# Patient Record
Sex: MALE | Race: WHITE | NOT HISPANIC OR LATINO | Employment: OTHER | URBAN - METROPOLITAN AREA
[De-identification: names, ages, dates, MRNs, and addresses within clinical notes are randomized per-mention and may not be internally consistent; named-entity substitution may affect disease eponyms.]

---

## 2017-01-10 ENCOUNTER — OFFICE VISIT (OUTPATIENT)
Dept: NEUROLOGY | Facility: CLINIC | Age: 72
End: 2017-01-10

## 2017-01-10 VITALS — DIASTOLIC BLOOD PRESSURE: 78 MMHG | HEIGHT: 70 IN | SYSTOLIC BLOOD PRESSURE: 135 MMHG | HEART RATE: 56 BPM

## 2017-01-10 DIAGNOSIS — R20.8 BURNING SENSATION OF FEET: Primary | ICD-10-CM

## 2017-01-10 DIAGNOSIS — R20.8 BURNING SENSATION OF FEET: ICD-10-CM

## 2017-01-10 ASSESSMENT — PAIN SCALES - GENERAL: PAINLEVEL: NO PAIN (0)

## 2017-01-10 NOTE — PROGRESS NOTES
Re: Daniel Samayoa      MRN# [id number]  YOB: 1945  Date of Visit:1/10/2017     OUTPATIENT NEUROLOGY VISIT NOTE    Reason for Visit:  EMG results follow up    Interval History:  Daniel Samayoa is a 71-year-old male presents to the clinic today for EMG results follow up    Symptoms are better. Pool therapy has been helpful.   EMG results discussed and given to the patient.   Interpretation:     This study is essentially normal. There is no electrodiagnostic evidence of a polyneuropathy, or right lumbosacral radiculopathy. Increased insertional activity in two muscles of the right leg is non-specific/non-diagnostic.      EMG Physician:     Mika Youngblood MD   Wait and see approach. Follow up in 3 month for re-exam.   Denies any new symptoms today.     Past Medical History reviewed   Social History   Substance Use Topics     Smoking status: Never Smoker      Smokeless tobacco: Never Used     Alcohol Use: 1.0 oz/week     2 Cans of beer per week      Comment: little    reviewed and verified with the patient    Current Outpatient Prescriptions   Medication Sig Dispense Refill     omeprazole (PRILOSEC) 20 MG capsule TAKE 1 CAPSULE BY MOUTH DAILY. 30 capsule 11     sildenafil (VIAGRA) 100 MG tablet Take 0.5-1 tablets ( mg) by mouth daily as needed for erectile dysfunction Take 30 min to 4 hours before intercourse.  Never use with nitroglycerin, terazosin or doxazosin. 6 tablet 3     triamcinolone (KENALOG) 0.1 % cream Apply topically 3 times daily as needed for irritation 30 g 0     rosuvastatin (CRESTOR) 5 MG tablet Take 1 tablet (5 mg) by mouth At Bedtime 90 tablet 3     calcium carbonate (TUMS) 500 MG chewable tablet Take 2 chew tab by mouth daily       aspirin 81 MG EC tablet Take 81 mg by mouth daily.       Ibuprofen (ADVIL) 200 MG capsule Take 200 mg by mouth every 4 hours as needed.       Calcium-Vitamin D (CALCIUM 500 +D) 500-125 MG-UNIT TABS Take  by mouth daily.       Cholecalciferol  "(D 2000) 2000 UNIT tablet Take 1 tablet by mouth daily.       Ascorbic Acid (C 1000 PO) Take  by mouth.       fish oil-omega-3 fatty acids (FISH OIL) 1000 MG capsule Take 2 g by mouth daily.       B Complex Vitamins (B COMPLEX 50 PO) Take  by mouth.     reviewed and verified with the patient    Review of Systems:   A 10-point ROS including constitutional, eyes, respiratory, cardiovascular, gastroenterology, genitourinary, integumentary, musculoskeletal, neurology and psychiatric were all negative except as mentioned in the interval history.      General Exam:   /78 mmHg  Pulse 56  Ht 1.778 m (5' 10\")  GEN: Awake, NAD; good eye contact, responses appropriately Speech is fluent without dysarthria.Face is symmetrical. Intact and symmetrical eyebrow and lid raise and eyelid closure, smiles. Hearing Intact to conversation speech.Normal casual gait.  Assessment and Plan:  See Interval History for discussion and plan.     I discussed all my recommendation with Daniel Samayoa. The patient verbalizes understanding and comfortable with the plan. The patient has our clinic phone number to call with any questions or concerns. All of the patient's questions were answered from the best of my current knowledge. Follow up as needed.    Time spent with pt answering questions, discussing findings, counseling and coordinating care was more than 50% the appointment time,  10 minutes.         ARNOLDO Vásquez, [facility]      "

## 2017-01-10 NOTE — PROGRESS NOTES
"    [facility]  Electrodiagnostic Laboratory    Nerve Conduction & EMG Report          Patient:       Daniel Samayoa  Patient ID:    [id number]  Gender:        Male  YOB: 1945  Age:           71 Years 3 Months      Referring Provider: Karlee Vieyra NP    History & Examination:    71 year old man with burning sensation at his feet for about 3-4 months. Symptoms are symmetric. 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 EMG requested to \"localize\" the cause of the chief complaint- polyneuropathy vs lumbosacral radiculopathies.     Techniques: Motor and sensory conduction studies were done with surface recording electrodes.Temperature was monitored and recorded throughout the study. Upper extremities were maintained at a temperature of 32 degrees Centigrade or higher.  Lower extremities were maintained at a temperature of 31degrees Centigrade or higher. EMG was done with a concentric needle electrode.     Results:    Right radial and sural antidromic sensory NCSs were normal. The sural to radial SNAP amplitude ratio was at the lower limits of normal (0.21, normal is >0.2). The left sural nerve was intentionally not studied because the patient had a metal plate from prior orthopedic procedure at the left lateral calf very close to the stimulation site of the nerve. Right median, deep peroneal and tibial motor NCSs were normal. Right tibial F-wave latencies were normal. EMG of right tibialis anterior and vastus lateralis showed increased insertional activity and occasional fasciculations, bu no sustained fibrillationss/positive waves, and normal MUP morphology and recruitment patterns. EMG of right medial gastrocnemius was normal.     Interpretation:    This study is essentially normal. There is no electrodiagnostic evidence of a polyneuropathy, or right lumbosacral radiculopathy. Increased insertional activity in two muscles of the right leg is " non-specific/non-diagnostic.     EMG Physician:    Mika Youngblood MD       Sensory NCS      Nerve / Sites Rec. Site Onset Peak NP Amp Ref. PP Amp Dist Rashaun Ref. Temp     ms ms  V  V  V cm m/s m/s  C   R RADIAL - Snuff      Forearm Snuff 2.19 2.76 26.8 15.0 19.3 12.5 57.1 48.0 26   R SURAL - Lat Mall 60      Calf Ankle 3.39 4.17 5.8 5.0 4.8 14 41.4 38.0 30.8       Motor NCS      Nerve / Sites Rec. Site Lat Ref. Amp Ref. Rel Amp Dist Rashaun Ref. Dur. Area Temp.     ms ms mV mV % cm m/s m/s ms %  C   R MEDIAN - APB      Wrist APB 3.65 4.40 9.5 5.0 100 8   7.55 100 31.5      Elbow APB 8.28  8.2  86.8 24 51.8 48.0 7.45 81.8 30.4   R DEEP PERONEAL - EDB 60      Ankle EDB 4.48 6.00 6.3 2.0 100 8   6.77 100 30.7      FibHead EDB 11.61  5.8  91.4 30.5 42.7 38.0 7.60 97.3 30.7      Pop Fos EDB 13.33  5.3  83.9 8 46.5 38.0 7.81 88.1 30.7   R TIBIAL - AH      Ankle AH 5.42 6.00 11.1 4.0 100 8   5.73 100 31      Pop Fos AH 15.47  5.8  52.2 40 39.8 38.0 6.88 79.2 31       F  Wave      Nerve Min F Lat Max F Lat Mean FLat Temp.    ms ms ms  C   R TIBIAL 55.94 57.81 57.03 31       EMG Summary Table     Spontaneous MUAP Recruitment    IA Fib PSW Fasc H.F. Amp Dur. PPP Pattern   R. TIB ANTERIOR 1+ None None 1+ None N N N N   R. GASTROCN (MED) N None None None None N N N N   R. VAST LATERALIS 2+ None None None None N N N N

## 2017-01-10 NOTE — Clinical Note
"1/10/2017       RE: Daniel Samayoa  [address]  [facility] [address]     Dear Colleague,    Thank you for referring your patient, Daniel Samayoa, to the [facility] EMG at [facility]. Please see a copy of my visit note below.        [facility]  Electrodiagnostic Laboratory    Nerve Conduction & EMG Report          Patient:       Daniel Samayoa  Patient ID:    [id number]  Gender:        Male  YOB: 1945  Age:           71 Years 3 Months      Referring Provider: Karlee Vieyra NP    History & Examination:    71 year old man with burning sensation at his feet for about 3-4 months. Symptoms are symmetric. 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 EMG requested to \"localize\" the cause of the chief complaint- polyneuropathy vs lumbosacral radiculopathies.     Techniques: Motor and sensory conduction studies were done with surface recording electrodes.Temperature was monitored and recorded throughout the study. Upper extremities were maintained at a temperature of 32 degrees Centigrade or higher.  Lower extremities were maintained at a temperature of 31degrees Centigrade or higher. EMG was done with a concentric needle electrode.     Results:    Right radial and sural antidromic sensory NCSs were normal. The sural to radial SNAP amplitude ratio was at the lower limits of normal (0.21, normal is >0.2). The left sural nerve was intentionally not studied because the patient had a metal plate from prior orthopedic procedure at the left lateral calf very close to the stimulation site of the nerve. Right median, deep peroneal and tibial motor NCSs were normal. Right tibial F-wave latencies were normal. EMG of right tibialis anterior and vastus lateralis showed increased insertional activity and occasional fasciculations, bu no sustained fibrillationss/positive waves, and normal MUP morphology and recruitment patterns. EMG of right " medial gastrocnemius was normal.     Interpretation:    This study is essentially normal. There is no electrodiagnostic evidence of a polyneuropathy, or right lumbosacral radiculopathy. Increased insertional activity in two muscles of the right leg is non-specific/non-diagnostic.     EMG Physician:    Mika Youngblood MD       Sensory NCS      Nerve / Sites Rec. Site Onset Peak NP Amp Ref. PP Amp Dist Rashaun Ref. Temp     ms ms  V  V  V cm m/s m/s  C   R RADIAL - Snuff      Forearm Snuff 2.19 2.76 26.8 15.0 19.3 12.5 57.1 48.0 26   R SURAL - Lat Mall 60      Calf Ankle 3.39 4.17 5.8 5.0 4.8 14 41.4 38.0 30.8       Motor NCS      Nerve / Sites Rec. Site Lat Ref. Amp Ref. Rel Amp Dist Rashaun Ref. Dur. Area Temp.     ms ms mV mV % cm m/s m/s ms %  C   R MEDIAN - APB      Wrist APB 3.65 4.40 9.5 5.0 100 8   7.55 100 31.5      Elbow APB 8.28  8.2  86.8 24 51.8 48.0 7.45 81.8 30.4   R DEEP PERONEAL - EDB 60      Ankle EDB 4.48 6.00 6.3 2.0 100 8   6.77 100 30.7      FibHead EDB 11.61  5.8  91.4 30.5 42.7 38.0 7.60 97.3 30.7      Pop Fos EDB 13.33  5.3  83.9 8 46.5 38.0 7.81 88.1 30.7   R TIBIAL - AH      Ankle AH 5.42 6.00 11.1 4.0 100 8   5.73 100 31      Pop Fos AH 15.47  5.8  52.2 40 39.8 38.0 6.88 79.2 31       F  Wave      Nerve Min F Lat Max F Lat Mean FLat Temp.    ms ms ms  C   R TIBIAL 55.94 57.81 57.03 31       EMG Summary Table     Spontaneous MUAP Recruitment    IA Fib PSW Fasc H.F. Amp Dur. PPP Pattern   R. TIB ANTERIOR 1+ None None 1+ None N N N N   R. GASTROCN (MED) N None None None None N N N N   R. VAST LATERALIS 2+ None None None None N N N N                    Quick Note:    Reviewed with the patient today. Patient reports that his symptoms have been better after starting his pool therapy. Reports that his stress is better as well. Wait and see approach discussed. Follow up in 3-6 month or sooner if needed for re-exam.  ______      Mika Youngblood MD

## 2017-01-10 NOTE — MR AVS SNAPSHOT
After Visit Summary   1/10/2017    Daniel Samayoa    MRN: [id number]           Patient Information     Date Of Birth          1945        Visit Information        Provider Department      1/10/2017 12:30 PM Karlee Vieyra APRN [facility]        Care Instructions    Wait and see approach. Follow up in 3 month for re-exam.           Follow-ups after your visit        Your next 10 appointments already scheduled     Jan 11, 2017  9:00 AM   New Visit with Kathy Ramirez OD   [facility]    [address] [place] [address] [phone]              Who to contact     Please call your clinic at [phone] to:    Ask questions about your health    Make or cancel appointments    Discuss your medicines    Learn about your test results    Speak to your doctor   If you have compliments or concerns about an experience at your clinic, or if you wish to file a complaint, please contact [facility] Patient Relations at [phone] or email us at [email or web address]         Additional Information About Your Visit        MyChart Information     [employer] gives you secure access to your electronic health record. If you see a primary care provider, you can also send messages to your care team and make appointments. If you have questions, please call your primary care clinic.  If you do not have a primary care provider, please call [phone] and they will assist you.      [employer] is an electronic gateway that provides easy, online access to your medical records. With [employer], you can request a clinic appointment, read your test results, renew a prescription or communicate with your care team.     To access your existing account, please contact your [facility] or call [phone] for assistance.        Care EveryWhere ID     This is your Care EveryWhere ID.  "This could be used by other organizations to access your [facility] medical records  [id number]        Your Vitals Were     Pulse Height                56 1.778 m (5' 10\")           Blood Pressure from Last 3 Encounters:   01/10/17 135/78   12/20/16 147/80   10/18/16 115/80    Weight from Last 3 Encounters:   12/20/16 92.488 kg (203 lb 14.4 oz)   10/18/16 90.266 kg (199 lb)   08/26/16 93.895 kg (207 lb)              Today, you had the following     No orders found for display       Primary Care Provider Office Phone # Fax #    Henry Cornelius [phone]       [facility] [address] [facility] [address]        Thank you!     Thank you for choosing [facility]  for your care. Our goal is always to provide you with excellent care. Hearing back from our patients is one way we can continue to improve our services. Please take a few minutes to complete the written survey that you may receive in the mail after your visit with us. Thank you!             Your Updated Medication List - Protect others around you: Learn how to safely use, store and throw away your medicines at www.disposemymeds.org.          This list is accurate as of: 1/10/17  1:48 PM.  Always use your most recent med list.                   Brand Name Dispense Instructions for use    ADVIL 200 MG capsule   Generic drug:  ibuprofen      Take 200 mg by mouth every 4 hours as needed.       aspirin 81 MG EC tablet      Take 81 mg by mouth daily.       B COMPLEX 50 PO      Take  by mouth.       C 1000 PO      Take  by mouth.       calcium-vitamin D 500-125 MG-UNIT Tabs      Take  by mouth daily.       D 2000 2000 UNITS tablet   Generic drug:  cholecalciferol      Take 1 tablet by mouth daily.       fish oil-omega-3 fatty acids 1000 MG capsule      Take 2 g by mouth daily.       omeprazole 20 MG CR capsule    priLOSEC    30 capsule    TAKE 1 CAPSULE BY MOUTH DAILY.       rosuvastatin 5 MG tablet    CRESTOR    90 " tablet    Take 1 tablet (5 mg) by mouth At Bedtime       sildenafil 100 MG cap/tab    VIAGRA    6 tablet    Take 0.5-1 tablets ( mg) by mouth daily as needed for erectile dysfunction Take 30 min to 4 hours before intercourse.  Never use with nitroglycerin, terazosin or doxazosin.       triamcinolone 0.1 % cream    KENALOG    30 g    Apply topically 3 times daily as needed for irritation       TUMS 500 MG chewable tablet   Generic drug:  calcium carbonate      Take 2 chew tab by mouth daily

## 2017-01-11 ENCOUNTER — OFFICE VISIT (OUTPATIENT)
Dept: OPTOMETRY | Facility: CLINIC | Age: 72
End: 2017-01-11
Payer: COMMERCIAL

## 2017-01-11 DIAGNOSIS — Z98.890 S/P LASIK SURGERY: ICD-10-CM

## 2017-01-11 DIAGNOSIS — H43.813 PVD (POSTERIOR VITREOUS DETACHMENT), BOTH EYES: ICD-10-CM

## 2017-01-11 DIAGNOSIS — H52.201 MYOPIA OF RIGHT EYE WITH ASTIGMATISM AND PRESBYOPIA: ICD-10-CM

## 2017-01-11 DIAGNOSIS — H52.4 MYOPIA OF RIGHT EYE WITH ASTIGMATISM AND PRESBYOPIA: ICD-10-CM

## 2017-01-11 DIAGNOSIS — H02.839 DERMATOCHALASIS OF EYELID: ICD-10-CM

## 2017-01-11 DIAGNOSIS — Z01.00 EXAMINATION OF EYES AND VISION: Primary | ICD-10-CM

## 2017-01-11 DIAGNOSIS — H52.11 MYOPIA OF RIGHT EYE WITH ASTIGMATISM AND PRESBYOPIA: ICD-10-CM

## 2017-01-11 DIAGNOSIS — H26.9 CATARACTS, BOTH EYES: ICD-10-CM

## 2017-01-11 PROCEDURE — 92014 COMPRE OPH EXAM EST PT 1/>: CPT | Performed by: OPTOMETRIST

## 2017-01-11 PROCEDURE — 92015 DETERMINE REFRACTIVE STATE: CPT | Performed by: OPTOMETRIST

## 2017-01-11 ASSESSMENT — REFRACTION_WEARINGRX
SPECS_TYPE: PAL
OD_ADD: +2.50
OD_SPHERE: -2.00
OS_CYLINDER: +0.50
OD_AXIS: 023
OS_ADD: +2.50
OD_CYLINDER: +1.75
OS_SPHERE: PLANO
OS_AXIS: 122

## 2017-01-11 ASSESSMENT — VISUAL ACUITY
METHOD: SNELLEN - LINEAR
OS_SC: 20/20
CORRECTION_TYPE: GLASSES
OS_CC: 20/30
OD_SC: 20/40 -1
OS_SC: 20/120
OS_CC: 20/30
OD_CC: 20/30
OD_SC: 20/250
OD_CC: 20/30

## 2017-01-11 ASSESSMENT — REFRACTION_MANIFEST
OS_ADD: +2.25
OS_SPHERE: PLANO
OD_AXIS: 015
OD_ADD: +2.25
OS_CYLINDER: SPHERE
OD_SPHERE: -2.00
OD_CYLINDER: +1.00

## 2017-01-11 ASSESSMENT — CONF VISUAL FIELD
OS_NORMAL: 1
OD_NORMAL: 1

## 2017-01-11 ASSESSMENT — SLIT LAMP EXAM - LIDS
COMMENTS: 2+ DERMATOCHALASIS - UPPER LID
COMMENTS: 2+ DERMATOCHALASIS - UPPER LID

## 2017-01-11 ASSESSMENT — TONOMETRY
IOP_METHOD: APPLANATION
OD_IOP_MMHG: 18
OS_IOP_MMHG: 16

## 2017-01-11 ASSESSMENT — EXTERNAL EXAM - LEFT EYE: OS_EXAM: NORMAL

## 2017-01-11 ASSESSMENT — CUP TO DISC RATIO
OD_RATIO: 0.3
OS_RATIO: 0.3

## 2017-01-11 ASSESSMENT — EXTERNAL EXAM - RIGHT EYE: OD_EXAM: NORMAL

## 2017-01-11 NOTE — PROGRESS NOTES
Chief Complaint   Patient presents with     COMPREHENSIVE EYE EXAM      Accompanied by self  Last Eye Exam: 1 year ago  Dilated Previously: Yes    What are you currently using to see?  glasses       Distance Vision Acuity: Noticed gradual change in both eyes    Near Vision Acuity: Satisfied with vision while reading  with glasses    Eye Comfort: dry  Do you use eye drops? : Yes: when needed  Occupation or Hobbies: retired    Sofiya Carballo, Optometric Tech         Medical, surgical and family histories reviewed and updated 1/11/2017.       OBJECTIVE: See Ophthalmology exam    ASSESSMENT:    ICD-10-CM    1. Examination of eyes and vision Z01.00 EYE EXAM (SIMPLE-NONBILLABLE)     REFRACTION   2. 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 Cataracts, both eyes H26.9 EYE EXAM (SIMPLE-NONBILLABLE)   4. PVD (posterior vitreous detachment), both eyes H43.813 EYE EXAM (SIMPLE-NONBILLABLE)   5. Dermatochalasis of eyelid H02.839 EYE EXAM (SIMPLE-NONBILLABLE)   6. S/P LASIK surgery, left eye Z98.890 EYE EXAM (SIMPLE-NONBILLABLE)      PLAN:   A final glasses prescription was given.  Allow time for adaptation.  The glasses may cause dizziness and affect depth perception for awhile.    Monitor cataracts and dermatochalasis by having yearly exams.  Wear sunglasses when outside.    A posterior vitreous detachment is nothing to worry about.  You have a jelly like substance that fills the inside of the eye, as you get older, that gel shrinks a little and when it shrinks, it pulls away from the back of the eye.  When it pulls away you can see a floater, as time goes on, the floater may shrink down out of your line of sight and you won't notice it so often.  There is a little greater risk of developing a retinal detachment since there's nothing pressing against the retina, so if you start to notice flashes of light or sudden vision changes, return to the  clinic as soon as possible, otherwise return as needed.    Return to clinic 1 year for Comprehensive Vision Exam      Kathy Ramirez O.D.  [address]  [id number] [phone]

## 2017-01-11 NOTE — PROGRESS NOTES
Quick Note:    Reviewed with the patient today. Patient reports that his symptoms have been better after starting his pool therapy. Reports that his stress is better as well. Wait and see approach discussed. Follow up in 3-6 month or sooner if needed for re-exam.  ______

## 2017-01-11 NOTE — PATIENT INSTRUCTIONS
A final glasses prescription was given.  Allow time for adaptation.  The glasses may cause dizziness and affect depth perception for awhile.    Monitor cataracts and dermatochalasis by having yearly exams.  Wear sunglasses when outside.    A posterior vitreous detachment is nothing to worry about.  You have a jelly like substance that fills the inside of the eye, as you get older, that gel shrinks a little and when it shrinks, it pulls away from the back of the eye.  When it pulls away you can see a floater, as time goes on, the floater may shrink down out of your line of sight and you won't notice it so often.  There is a little greater risk of developing a retinal detachment since there's nothing pressing against the retina, so if you start to notice flashes of light or sudden vision changes, return to the clinic as soon as possible, otherwise return as needed.    Return to clinic 1 year for Comprehensive Vision Exam      Kathy Ramirez O.D.  [address]  [place]  [address]    [phone]

## 2017-01-11 NOTE — MR AVS SNAPSHOT
After Visit Summary   1/11/2017    Daniel Samayoa    MRN: [id number]           Patient Information     Date Of Birth          1945        Visit Information        Provider Department      1/11/2017 9:00 AM Kathy Ramirez OD [facility]        Today's Diagnoses     Examination of eyes and vision    -  1     Myopia of right eye with astigmatism and presbyopia         Cataracts, both eyes         PVD (posterior vitreous detachment), both eyes         Dermatochalasis of eyelid         S/P LASIK surgery, left eye           Care Instructions        A final glasses prescription was given.  Allow time for adaptation.  The glasses may cause dizziness and affect depth perception for awhile.    Monitor cataracts and dermatochalasis by having yearly exams.  Wear sunglasses when outside.    A posterior vitreous detachment is nothing to worry about.  You have a jelly like substance that fills the inside of the eye, as you get older, that gel shrinks a little and when it shrinks, it pulls away from the back of the eye.  When it pulls away you can see a floater, as time goes on, the floater may shrink down out of your line of sight and you won't notice it so often.  There is a little greater risk of developing a retinal detachment since there's nothing pressing against the retina, so if you start to notice flashes of light or sudden vision changes, return to the clinic as soon as possible, otherwise return as needed.    Return to clinic 1 year for Comprehensive Vision Exam      Kathy Ramirez O.D.  [facility]  [address] [place]  [address]    [phone]              Follow-ups after your visit        Follow-up notes from your care team     Return in about 1 year (around 1/11/2018) for Eye Exam.      Who to contact     If you have questions or need follow up information about today's clinic visit or your schedule please contact [facility] directly  at [phone].  Normal or non-critical lab and imaging results will be communicated to you by MyChart, letter or phone within 4 business days after the clinic has received the results. If you do not hear from us within 7 days, please contact the clinic through [employer] or phone. If you have a critical or abnormal lab result, we will notify you by phone as soon as possible.  Submit refill requests through AesRx or call your pharmacy and they will forward the refill request to us. Please allow 3 business days for your refill to be completed.          Additional Information About Your Visit        [employer] Information     AesRx gives you secure access to your electronic health record. If you see a primary care provider, you can also send messages to your care team and make appointments. If you have questions, please call your primary care clinic.  If you do not have a primary care provider, please call [phone] and they will assist you.        Care EveryWhere ID     This is your Care EveryWhere ID. This could be used by other organizations to access your [place] medical records  [id number]         Blood Pressure from Last 3 Encounters:   01/10/17 135/78   12/20/16 147/80   10/18/16 115/80    Weight from Last 3 Encounters:   12/20/16 92.488 kg (203 lb 14.4 oz)   10/18/16 90.266 kg (199 lb)   08/26/16 93.895 kg (207 lb)              We Performed the Following     EYE EXAM (SIMPLE-NONBILLABLE)     REFRACTION        Primary Care Provider Office Phone # Fax #    Henry Cornelius [phone]       [facility] [address]  [facility] [address]        Thank you!     Thank you for choosing [facility]  for your care. Our goal is always to provide you with excellent care. Hearing back from our patients is one way we can continue to improve our services. Please take a few minutes to complete the written survey that you may receive in the mail after your visit with us.  Thank you!             Your Updated Medication List - Protect others around you: Learn how to safely use, store and throw away your medicines at www.disposemymeds.org.          This list is accurate as of: 1/11/17 10:04 AM.  Always use your most recent med list.                   Brand Name Dispense Instructions for use    ADVIL 200 MG capsule   Generic drug:  ibuprofen      Take 200 mg by mouth every 4 hours as needed.       aspirin 81 MG EC tablet      Take 81 mg by mouth daily.       B COMPLEX 50 PO      Take  by mouth.       C 1000 PO      Take  by mouth.       calcium-vitamin D 500-125 MG-UNIT Tabs      Take  by mouth daily.       D 2000 2000 UNITS tablet   Generic drug:  cholecalciferol      Take 1 tablet by mouth daily.       fish oil-omega-3 fatty acids 1000 MG capsule      Take 2 g by mouth daily.       omeprazole 20 MG CR capsule    priLOSEC    30 capsule    TAKE 1 CAPSULE BY MOUTH DAILY.       rosuvastatin 5 MG tablet    CRESTOR    90 tablet    Take 1 tablet (5 mg) by mouth At Bedtime       sildenafil 100 MG cap/tab    VIAGRA    6 tablet    Take 0.5-1 tablets ( mg) by mouth daily as needed for erectile dysfunction Take 30 min to 4 hours before intercourse.  Never use with nitroglycerin, terazosin or doxazosin.       triamcinolone 0.1 % cream    KENALOG    30 g    Apply topically 3 times daily as needed for irritation       TUMS 500 MG chewable tablet   Generic drug:  calcium carbonate      Take 2 chew tab by mouth daily

## 2017-01-31 DIAGNOSIS — E78.5 DYSLIPIDEMIA: Primary | ICD-10-CM

## 2017-01-31 RX ORDER — ROSUVASTATIN CALCIUM 5 MG/1
5 TABLET, COATED ORAL AT BEDTIME
Qty: 90 TABLET | Refills: 2 | Status: SHIPPED | OUTPATIENT
Start: 2017-01-31 | End: 2018-02-20

## 2017-02-20 ENCOUNTER — TELEPHONE (OUTPATIENT)
Dept: FAMILY MEDICINE | Facility: CLINIC | Age: 72
End: 2017-02-20

## 2017-02-20 DIAGNOSIS — R05.9 COUGH: Primary | ICD-10-CM

## 2017-02-20 RX ORDER — AZITHROMYCIN 250 MG/1
TABLET, FILM COATED ORAL
Qty: 6 TABLET | Refills: 0 | Status: SHIPPED | OUTPATIENT
Start: 2017-02-20 | End: 2017-02-25

## 2017-03-15 ENCOUNTER — MEDICAL CORRESPONDENCE (OUTPATIENT)
Dept: FAMILY MEDICINE | Facility: CLINIC | Age: 72
End: 2017-03-15

## 2017-08-18 DIAGNOSIS — Z00.00 ENCOUNTER FOR ROUTINE ADULT HEALTH EXAMINATION: Primary | ICD-10-CM

## 2017-08-18 LAB
% GRANULOCYTES: 58.6 % (ref 42.2–75.2)
HCT VFR BLD AUTO: 40.1 % (ref 39–51)
HEMOGLOBIN: 13.7 G/DL (ref 13.4–17.5)
LYMPHOCYTES NFR BLD AUTO: 33.1 % (ref 20.5–51.1)
MCH RBC QN AUTO: 31.9 PG (ref 27–31)
MCHC RBC AUTO-ENTMCNC: 34.3 G/DL (ref 33–37)
MCV RBC AUTO: 92.9 FL (ref 80–100)
MONOCYTES NFR BLD AUTO: 8.3 % (ref 1.7–9.3)
PLATELET # BLD AUTO: 159 K/UL (ref 140–450)
RBC # BLD AUTO: 4.32 X10/CMM (ref 4.2–5.9)
WBC # BLD AUTO: 5.8 X10/CMM (ref 3.8–11)

## 2017-08-18 PROCEDURE — 85025 COMPLETE CBC W/AUTO DIFF WBC: CPT | Performed by: FAMILY MEDICINE

## 2017-08-18 PROCEDURE — 36415 COLL VENOUS BLD VENIPUNCTURE: CPT | Performed by: FAMILY MEDICINE

## 2017-08-18 NOTE — LETTER
[facility] [address] [place]  [address]  Phone: [phone]    August 22, 2017      Daniel Samayoa  [address] [place], [place] [address]E [facility] [address]              Dear Daniel,    These results are fine. Let me know if you have questions.           Sincerely,     Henry Cornelius M.D.    Results for orders placed or performed in visit on 08/18/17   Hemoglobin A1C ([facility])   Result Value Ref Range    Hemoglobin A1C 5.8 (H) 4.8 - 5.6 %    Narrative    Performed at:  01 - [facility] [address], [place], [place]  [id number]  : Abhishek Milian MD, Phone:  [phone]   TSH ([facility])   Result Value Ref Range    TSH 2.600 0.450 - 4.500 uIU/mL    Narrative    Performed at:  01 - [facility] [address], [place], [place]  [id number]  : Abhishek Milian MD, Phone:  [phone]   PSA Serum ([facility])   Result Value Ref Range    PSA NG/ML <0.1 0.0 - 4.0 ng/mL    Narrative    Performed at:  01 - [facility] [address], [place], [place]  [id number]  : Abhishek Milian MD, Phone:  [phone]   Lipid Panel ([facility])   Result Value Ref Range    Cholesterol 145 100 - 199 mg/dL    Triglycerides 119 0 - 149 mg/dL    HDL Cholesterol 58 >39 mg/dL    VLDL Cholesterol [place] 24 5 - 40 mg/dL    LDL Cholesterol Calculated 63 0 - 99 mg/dL    LDL/HDL Ratio 1.1 0.0 - 3.6 ratio units    Narrative    Performed at:  01 - [facility] [address], [place], [place]  [id number]  : Abhishek Milian MD, Phone:  [phone]   Comp. Metabolic Panel (14) ([facility])   Result Value Ref Range    Glucose 108 (H) 65 - 99 mg/dL    Urea Nitrogen 25 8 - 27 mg/dL    Creatinine 0.80 0.76 - 1.27 mg/dL    eGFR If NonAfricn Am 91 >59 mL/min/1.73    eGFR If Africn Am 105 >59 mL/min/1.73    BUN/Creatinine Ratio 31 (H) 10 - 24    Sodium 142 134 - 144 mmol/L    Potassium 4.0 3.5 - 5.2 mmol/L    Chloride 103 96 - 106 mmol/L    Total CO2 24 18 - 28 mmol/L    Calcium 8.8 8.6 - 10.2 mg/dL     Protein Total 6.2 6.0 - 8.5 g/dL    Albumin 4.0 3.6 - 4.8 g/dL    Globulin, Total 2.2 1.5 - 4.5 g/dL    A/G Ratio 1.8 1.2 - 2.2    Bilirubin Total 0.3 0.0 - 1.2 mg/dL    Alkaline Phosphatase 72 39 - 117 IU/L    AST 22 0 - 40 IU/L    ALT 21 0 - 44 IU/L    Narrative    Performed at:  01 - [facility] [address], [place], [place]  [id number]  : Abhishek Milian MD, Phone:  [phone]   CBC with Diff/Plt ([facility]   Result Value Ref Range    WBC x10/cmm 5.8 3.8 - 11.0 x10/cmm    % Lymphocytes 33.1 20.5 - 51.1 %    % Monocytes 8.3 1.7 - 9.3 %    % Granulocytes 58.6 42.2 - 75.2 %    RBC x10/cmm 4.32 4.2 - 5.9 x10/cmm    Hemoglobin 13.7 13.4 - 17.5 g/dl    Hematocrit 40.1 39 - 51 %    MCV 92.9 80 - 100 fL    MCH 31.9 (A) 27.0 - 31.0 pg    MCHC 34.3 33.0 - 37.0 g/dL    Platelet Count 159 140 - 450 K/uL

## 2017-08-19 LAB
ALBUMIN SERPL-MCNC: 4 G/DL (ref 3.6–4.8)
ALBUMIN/GLOB SERPL: 1.8 {RATIO} (ref 1.2–2.2)
ALP SERPL-CCNC: 72 IU/L (ref 39–117)
ALT SERPL-CCNC: 21 IU/L (ref 0–44)
AST SERPL-CCNC: 22 IU/L (ref 0–40)
BILIRUB SERPL-MCNC: 0.3 MG/DL (ref 0–1.2)
BUN SERPL-MCNC: 25 MG/DL (ref 8–27)
BUN/CREATININE RATIO: 31 (ref 10–24)
CALCIUM SERPL-MCNC: 8.8 MG/DL (ref 8.6–10.2)
CHLORIDE SERPLBLD-SCNC: 103 MMOL/L (ref 96–106)
CHOLEST SERPL-MCNC: 145 MG/DL (ref 100–199)
CREAT SERPL-MCNC: 0.8 MG/DL (ref 0.76–1.27)
EGFR IF AFRICN AM: 105 ML/MIN/1.73
EGFR IF NONAFRICN AM: 91 ML/MIN/1.73
GLOBULIN, TOTAL: 2.2 G/DL (ref 1.5–4.5)
GLUCOSE SERPL-MCNC: 108 MG/DL (ref 65–99)
HBA1C MFR BLD: 5.8 % (ref 4.8–5.6)
HDLC SERPL-MCNC: 58 MG/DL
LDL/HDL RATIO: 1.1 RATIO UNITS (ref 0–3.6)
LDLC SERPL CALC-MCNC: 63 MG/DL (ref 0–99)
POTASSIUM SERPL-SCNC: 4 MMOL/L (ref 3.5–5.2)
PROT SERPL-MCNC: 6.2 G/DL (ref 6–8.5)
PSA NG/ML: <0.1 NG/ML (ref 0–4)
SODIUM SERPL-SCNC: 142 MMOL/L (ref 134–144)
TOTAL CO2: 24 MMOL/L (ref 18–28)
TRIGL SERPL-MCNC: 119 MG/DL (ref 0–149)
TSH BLD-ACNC: 2.6 UIU/ML (ref 0.45–4.5)
VLDLC SERPL CALC-MCNC: 24 MG/DL (ref 5–40)

## 2017-08-25 ENCOUNTER — OFFICE VISIT (OUTPATIENT)
Dept: FAMILY MEDICINE | Facility: CLINIC | Age: 72
End: 2017-08-25

## 2017-08-25 VITALS
HEIGHT: 69 IN | WEIGHT: 204 LBS | BODY MASS INDEX: 30.21 KG/M2 | TEMPERATURE: 98 F | DIASTOLIC BLOOD PRESSURE: 70 MMHG | OXYGEN SATURATION: 98 % | SYSTOLIC BLOOD PRESSURE: 118 MMHG | HEART RATE: 65 BPM

## 2017-08-25 DIAGNOSIS — Z11.59 NEED FOR HEPATITIS C SCREENING TEST: ICD-10-CM

## 2017-08-25 DIAGNOSIS — Z76.0 ENCOUNTER FOR MEDICATION REFILL: ICD-10-CM

## 2017-08-25 DIAGNOSIS — Z23 NEED FOR TETANUS BOOSTER: Primary | ICD-10-CM

## 2017-08-25 DIAGNOSIS — Z00.00 MEDICARE ANNUAL WELLNESS VISIT, SUBSEQUENT: ICD-10-CM

## 2017-08-25 DIAGNOSIS — C61 PROSTATE CANCER (H): ICD-10-CM

## 2017-08-25 DIAGNOSIS — E78.5 DYSLIPIDEMIA: ICD-10-CM

## 2017-08-25 PROCEDURE — 99213 OFFICE O/P EST LOW 20 MIN: CPT | Mod: 25 | Performed by: FAMILY MEDICINE

## 2017-08-25 PROCEDURE — 90471 IMMUNIZATION ADMIN: CPT | Performed by: FAMILY MEDICINE

## 2017-08-25 PROCEDURE — 36415 COLL VENOUS BLD VENIPUNCTURE: CPT | Performed by: FAMILY MEDICINE

## 2017-08-25 PROCEDURE — 90715 TDAP VACCINE 7 YRS/> IM: CPT | Mod: GX | Performed by: FAMILY MEDICINE

## 2017-08-25 PROCEDURE — 99397 PER PM REEVAL EST PAT 65+ YR: CPT | Mod: 25 | Performed by: FAMILY MEDICINE

## 2017-08-25 NOTE — PROGRESS NOTES
SUBJECTIVE:   Daniel Samayoa is a 71 year old male who presents for Preventive Visit.  He is also being monitored for his hyperlipidemia. No myalgias.    Are you in the first 12 months of your Medicare Part B coverage?  No    Healthy Habits:    Do you get at least three servings of calcium containing foods daily (dairy, green leafy vegetables, etc.)? yes and no, taking calcium and/or vitamin D supplement    Amount of exercise or daily activities, outside of work: 3-4 day(s) per week    Problems taking medications regularly No    Medication side effects: No    Have you had an eye exam in the past two years? yes    Do you see a dentist twice per year? yes    Do you have sleep apnea, excessive snoring or daytime drowsiness?no    COGNITIVE SCREEN  1) Repeat 3 items (Banana, Sunrise, Chair)    2) Clock draw: NORMAL  3) 3 item recall: Recalls 3 objects  Results: 3 items recalled: COGNITIVE IMPAIRMENT LESS LIKELY    Mini-CogTM Copyright ALEKSANDR Sawant. Licensed by the author for use in [facility]; reprinted with permission (rafa@[facility]). All rights reserved.              CONCERNS  Hemorrhoids     Reviewed and updated as needed this visit by clinical staffTobacco  Allergies  Meds         Reviewed and updated as needed this visit by Provider        Social History   Substance Use Topics     Smoking status: Never Smoker     Smokeless tobacco: Never Used     Alcohol use 1.0 oz/week     2 Cans of beer per week      Comment: little       The patient does not drink >3 drinks per day nor >7 drinks per week.    Today's PHQ-2 Score: 0  PHQ-2 ( 1999 Pf[employer]) 8/25/2017 6/29/2016   Q1: Little interest or pleasure in doing things 0 0   Q2: Feeling down, depressed or hopeless 0 0   PHQ-2 Score 0 0         Do you feel safe in your environment - Yes    Do you have a Health Care Directive?: Yes: Patient states has Advance Directive and will bring in a copy to clinic.    Current providers sharing in care for this patient  include: Patient Care Team:  Henry Cornelius MD as PCP - General (Family Practice)  Vickie Mar, RN as Nurse Coordinator (Neurology)  Carolyn Zheng, RN as Nurse Coordinator (Neurology)      Hearing impairment: No    Ability to successfully perform activities of daily living: Yes, no assistance needed     Fall risk:  Fallen 2 or more times in the past year?: No  Any fall with injury in the past year?: No      Home safety:  none identified      The following health maintenance items are reviewed in Epic and correct as of today:  Health Maintenance   Topic Date Due     AORTIC ANEURYSM SCREENING (SYSTEM ASSIGNED)  10/05/2012     TETANUS IMMUNIZATION (SYSTEM ASSIGNED)  07/11/2016     INFLUENZA VACCINE (SYSTEM ASSIGNED)  09/01/2017     EYE EXAM Q1 YEAR  01/11/2018     FALL RISK ASSESSMENT  08/25/2018     ADVANCE DIRECTIVE PLANNING Q5 YRS  06/24/2020     COLONOSCOPY Q5 YR  09/21/2020     LIPID SCREEN Q5 YR MALE (SYSTEM ASSIGNED)  08/18/2022     PNEUMOCOCCAL  Completed     HEPATITIS C SCREENING  Completed     Patient Active Problem List   Diagnosis     Prostate cancer (H)     Dyslipidemia     Abdominal pain     S/P LASIK surgery, left eye     Health Care Home     Advance Care Planning     Cataracts, both eyes     PVD (posterior vitreous detachment), both eyes     Past Surgical History:   Procedure Laterality Date     APPENDECTOMY  1980     LASER SURGERY OF EYE      correction left lense     lasic       LASIK  2 -3 years ago     left eye only     LEG SURGERY  2004    ORIF     PROSTATECTOMY RETROPUBIC RADICAL  2002    Radical at [place]       Social History   Substance Use Topics     Smoking status: Never Smoker     Smokeless tobacco: Never Used     Alcohol use 1.0 oz/week     2 Cans of beer per week      Comment: little     Family History   Problem Relation Age of Onset     Glaucoma No family hx of      Macular Degeneration No family hx of          Current Outpatient Prescriptions   Medication Sig Dispense Refill      "rosuvastatin (CRESTOR) 5 MG tablet Take 1 tablet (5 mg) by mouth At Bedtime 90 tablet 2     omeprazole (PRILOSEC) 20 MG capsule TAKE 1 CAPSULE BY MOUTH DAILY. 30 capsule 11     calcium carbonate (TUMS) 500 MG chewable tablet Take 2 chew tab by mouth daily       aspirin 81 MG EC tablet Take 81 mg by mouth daily.       Ibuprofen (ADVIL) 200 MG capsule Take 200 mg by mouth every 4 hours as needed.       Calcium-Vitamin D (CALCIUM 500 +D) 500-125 MG-UNIT TABS Take  by mouth daily.       Cholecalciferol (D 2000) 2000 UNIT tablet Take 1 tablet by mouth daily.       Ascorbic Acid (C 1000 PO) Take  by mouth.       fish oil-omega-3 fatty acids (FISH OIL) 1000 MG capsule Take 2 g by mouth daily.       B Complex Vitamins (B COMPLEX 50 PO) Take  by mouth.       sildenafil (VIAGRA) 100 MG tablet Take 0.5-1 tablets ( mg) by mouth daily as needed for erectile dysfunction Take 30 min to 4 hours before intercourse.  Never use with nitroglycerin, terazosin or doxazosin. 6 tablet 3             ROS:  C: NEGATIVE for fever, chills, change in weight  I: NEGATIVE for worrisome rashes, moles or lesions  E: NEGATIVE for vision changes or irritation  E/M: NEGATIVE for ear, mouth and throat problems  R: NEGATIVE for significant cough or SOB  CV: NEGATIVE for chest pain, palpitations or peripheral edema  GI: NEGATIVE for nausea, abdominal pain, heartburn, or change in bowel habits.   : NEGATIVE for frequency, dysuria, or hematuria  M: NEGATIVE for significant arthralgias or myalgia  N: NEGATIVE for weakness, dizziness or paresthesias  E: NEGATIVE for temperature intolerance, skin/hair changes  H: NEGATIVE for bleeding problems  P: NEGATIVE for changes in mood or affect. HIS WIFE HAS LEUKEMIA, AND HE HAS A LOT TO HELP HER WITH. SHE IS CONTINUING TREATMENT, NO EVIDENCE OF DISEASE NOW.    OBJECTIVE:   /70  Pulse 65  Temp 98  F (36.7  C)  Ht 1.74 m (5' 8.5\")  Wt 92.5 kg (204 lb)  SpO2 98%  BMI 30.57 kg/m2 Estimated body mass " "index is 30.57 kg/(m^2) as calculated from the following:    Height as of this encounter: 1.74 m (5' 8.5\").    Weight as of this encounter: 92.5 kg (204 lb).  EXAM:   GENERAL: healthy, alert and no distress  EYES: Eyes grossly normal to inspection, PERRL and conjunctivae and sclerae normal  HENT: ear canals and TM's normal, nose and mouth without ulcers or lesions  NECK: no adenopathy, no asymmetry, masses, or scars and thyroid normal to palpation  RESP: lungs clear to auscultation - no rales, rhonchi or wheezes  CV: regular rate and rhythm, normal S1 S2, no S3 or S4, no murmur, click or rub, no peripheral edema and peripheral pulses strong  ABDOMEN: soft, nontender, no hepatosplenomegaly, no masses and bowel sounds normal  MS: no gross musculoskeletal defects noted, no edema  SKIN: no suspicious lesions or rashes  NEURO: Normal strength and tone, mentation intact and speech normal  PSYCH: mentation appears normal, affect normal/bright    ASSESSMENT / PLAN:       ICD-10-CM    1. Need for tetanus booster Z23 TDAP VACCINE (BOOSTRIX)   2. Medicare annual wellness visit, subsequent Z00.00    3. Encounter for medication refill Z76.0 omeprazole (PRILOSEC) 20 MG CR capsule   4. Need for hepatitis C screening test Z11.59 HCV Antibody ([facility])       End of Life Planning:  Patient currently has an advanced directive: Yes.  Practitioner is supportive of decision.    COUNSELING        Estimated body mass index is 30.57 kg/(m^2) as calculated from the following:    Height as of this encounter: 1.74 m (5' 8.5\").    Weight as of this encounter: 92.5 kg (204 lb).  Weight management plan: Discussed healthy diet and exercise guidelines and patient will follow up in 12 months in clinic to re-evaluate.   reports that he has never smoked. He has never used smokeless tobacco.      Appropriate preventive services were discussed with this patient, including applicable screening as appropriate for cardiovascular disease, diabetes, " osteopenia/osteoporosis, and glaucoma.  As appropriate for age/gender, discussed screening for colorectal cancer, prostate cancer, breast cancer, and cervical cancer. Checklist reviewing preventive services available has been given to the patient.    Reviewed patients plan of care and provided an AVS. The Basic Care Plan (routine screening as documented in Health Maintenance) for Daniel meets the Care Plan requirement. This Care Plan has been established and reviewed with the Patient.    Counseling Resources:  ATP IV Guidelines  Pooled Cohorts Equation Calculator  Breast Cancer Risk Calculator  FRAX Risk Assessment  ICSI Preventive Guidelines  Dietary Guidelines for Americans, 2010  USDA's MyPlate  ASA Prophylaxis  Lung CA Screening    Henry Cornelius MD  [facility]

## 2017-08-26 LAB — HCV AB SERPL QL IA: <0.1 S/CO RATIO (ref 0–0.9)

## 2017-08-28 NOTE — PROGRESS NOTES
These results show no exposure to Hepatitis C, as we expected. Let me know if you have questions.  Dr. Cornelius

## 2017-11-20 ENCOUNTER — OFFICE VISIT (OUTPATIENT)
Dept: FAMILY MEDICINE | Facility: CLINIC | Age: 72
End: 2017-11-20

## 2017-11-20 VITALS
HEART RATE: 54 BPM | WEIGHT: 202 LBS | BODY MASS INDEX: 29.92 KG/M2 | SYSTOLIC BLOOD PRESSURE: 122 MMHG | OXYGEN SATURATION: 96 % | DIASTOLIC BLOOD PRESSURE: 76 MMHG | RESPIRATION RATE: 16 BRPM | HEIGHT: 69 IN

## 2017-11-20 DIAGNOSIS — R10.9 LEFT FLANK DISCOMFORT: Primary | ICD-10-CM

## 2017-11-20 LAB
BACTERIA URINE: NORMAL
BILIRUB UR QL STRIP: 0
BLOOD URINE DIP: 0
CASTS/LPF: NORMAL
COLOR UR: YELLOW
CRYSTAL URINE: NORMAL
EPITHELIAL CELLS - QUEST: NORMAL
GLUCOSE UR STRIP-MCNC: 0 MG/DL
KETONES UR QL STRIP: 0
LEUKOCYTE ESTERASE URINE DIP: 0
MUCOUS URINE: NORMAL
NITRITE UR QL STRIP: NORMAL
PH UR STRIP: 6 PH (ref 5–9)
PROT UR QL: 0 MG/DL (ref ?–0.01)
RBC URINE: NORMAL (ref 0–3)
SP GR UR STRIP: 1.01 (ref 1–1.02)
UROBILINOGEN UR QL STRIP: 0.2 EU/DL (ref 0.2–1)
WBC URINE: NORMAL (ref 0–3)

## 2017-11-20 PROCEDURE — 99214 OFFICE O/P EST MOD 30 MIN: CPT | Performed by: FAMILY MEDICINE

## 2017-11-20 PROCEDURE — 81003 URINALYSIS AUTO W/O SCOPE: CPT | Performed by: FAMILY MEDICINE

## 2017-11-20 NOTE — MR AVS SNAPSHOT
After Visit Summary   11/20/2017    Daniel Samayoa    MRN: [id number]           Patient Information     Date Of Birth          1945        Visit Information        Provider Department      11/20/2017 3:30 PM Henry Cornelius MD [facility]        Today's Diagnoses     Left flank discomfort    -  1       Follow-ups after your visit        Additional Services     Referral to [facility]       Referral to [facility]  Phone: [phone]  Fax: [phone]  Reason for referral: CT abdomen/pelvis                  Who to contact     If you have questions or need follow up information about today's clinic visit or your schedule please contact [facility] directly at [phone].  Normal or non-critical lab and imaging results will be communicated to you by [employer], letter or phone within 4 business days after the clinic has received the results. If you do not hear from us within 7 days, please contact the clinic through [employer] or phone. If you have a critical or abnormal lab result, we will notify you by phone as soon as possible.  Submit refill requests through mEgo or call your pharmacy and they will forward the refill request to us. Please allow 3 business days for your refill to be completed.          Additional Information About Your Visit        MyChart Information     mEgo gives you secure access to your electronic health record. If you see a primary care provider, you can also send messages to your care team and make appointments. If you have questions, please call your primary care clinic.  If you do not have a primary care provider, please call [phone] and they will assist you.        Care EveryWhere ID     This is your Care EveryWhere ID. This could be used by other organizations to access your [place] medical records  [id number]        Your Vitals Were     Pulse Respirations Height Pulse Oximetry BMI (Body Mass Index)       54 16 1.74 m  "(5' 8.5\") 96% 30.27 kg/m2        Blood Pressure from Last 3 Encounters:   11/20/17 122/76   08/25/17 118/70   01/10/17 135/78    Weight from Last 3 Encounters:   11/20/17 91.6 kg (202 lb)   08/25/17 92.5 kg (204 lb)   12/20/16 92.5 kg (203 lb 14.4 oz)              We Performed the Following     Referral to [facility]     Urinalysis w/reflex protein, bili ([facility])        Primary Care Provider Office Phone # Fax #    Henry Cornelius [phone] [address] [facility] [address]        Equal Access to Services     MARICRUZ VILA : Hadii aad ku hadasho Soomaali, waaxda luqadaha, qaybta kaalmada adeegyada, waxay idiin hayjhon warren . So [facility] [phone].    ATENCIÓN: Si habla español, tiene a olson disposición servicios gratuitos de asistencia lingüística. [facility] [phone].    We comply with applicable federal civil rights laws and [place] laws. We do not discriminate on the basis of race, color, national origin, age, disability, sex, sexual orientation, or gender identity.            Thank you!     Thank you for choosing [facility]  for your care. Our goal is always to provide you with excellent care. Hearing back from our patients is one way we can continue to improve our services. Please take a few minutes to complete the written survey that you may receive in the mail after your visit with us. Thank you!             Your Updated Medication List - Protect others around you: Learn how to safely use, store and throw away your medicines at www.disposemymeds.org.          This list is accurate as of: 11/20/17  4:59 PM.  Always use your most recent med list.                   Brand Name Dispense Instructions for use Diagnosis    ADVIL 200 MG capsule   Generic drug:  ibuprofen      Take 200 mg by mouth every 4 hours as needed.        aspirin 81 MG EC tablet      Take 81 mg by mouth daily.        B COMPLEX 50 PO      Take  by mouth.        C 1000 PO      Take  " by mouth.        calcium-vitamin D 500-125 MG-UNIT Tabs      Take  by mouth daily.        D 2000 2000 UNITS tablet   Generic drug:  cholecalciferol      Take 1 tablet by mouth daily.        fish oil-omega-3 fatty acids 1000 MG capsule      Take 2 g by mouth daily.        omeprazole 20 MG CR capsule    priLOSEC    30 capsule    Take 1 capsule (20 mg) by mouth daily as needed    Encounter for medication refill       rosuvastatin 5 MG tablet    CRESTOR    90 tablet    Take 1 tablet (5 mg) by mouth At Bedtime    Dyslipidemia       sildenafil 100 MG tablet    VIAGRA    6 tablet    Take 0.5-1 tablets ( mg) by mouth daily as needed for erectile dysfunction Take 30 min to 4 hours before intercourse.  Never use with nitroglycerin, terazosin or doxazosin.    Erectile dysfunction following radical prostatectomy       TUMS 500 MG chewable tablet   Generic drug:  calcium carbonate      Take 2 chew tab by mouth daily

## 2017-11-20 NOTE — PROGRESS NOTES
"Left sided flank pain times two, last week. It lasted about 45-60 minutes, accompanied with diaphoresis. Moderately severe, no nausea. Both occurred after exercise. He is somewhat anxious about this. His wife is being treated for leukemia, and prognosis is poor.  Current Outpatient Prescriptions   Medication     omeprazole (PRILOSEC) 20 MG CR capsule     rosuvastatin (CRESTOR) 5 MG tablet     sildenafil (VIAGRA) 100 MG tablet     calcium carbonate (TUMS) 500 MG chewable tablet     aspirin 81 MG EC tablet     Ibuprofen (ADVIL) 200 MG capsule     Calcium-Vitamin D (CALCIUM 500 +D) 500-125 MG-UNIT TABS     Cholecalciferol (D 2000) 2000 UNIT tablet     Ascorbic Acid (C 1000 PO)     fish oil-omega-3 fatty acids (FISH OIL) 1000 MG capsule     B Complex Vitamins (B COMPLEX 50 PO)     No current facility-administered medications for this visit.      Patient Active Problem List   Diagnosis     Prostate cancer (H)     Dyslipidemia     Health Care Home     Advance Care Planning     Cataracts, both eyes     PVD (posterior vitreous detachment), both eyes     ROS: no CP, no fever  OBJECTIVE: /76  Pulse 54  Resp 16  Ht 1.74 m (5' 8.5\")  Wt 91.6 kg (202 lb)  SpO2 96%  BMI 30.27 kg/m2 NAD lungs are clear, RRR. No tenderness of the back, including percussion. UA normal  (R10.9) Left flank discomfort  (primary encounter diagnosis)  Comment: he is concerned about kidney stones, with general anxiety  Plan: Urinalysis w/reflex protein, bili ([facility]),         Referral to [facility]        For stone protocol CT      "

## 2018-01-18 ENCOUNTER — OFFICE VISIT (OUTPATIENT)
Dept: OPHTHALMOLOGY | Facility: CLINIC | Age: 73
End: 2018-01-18
Payer: COMMERCIAL

## 2018-01-18 DIAGNOSIS — H25.813 COMBINED FORMS OF AGE-RELATED CATARACT OF BOTH EYES: Primary | ICD-10-CM

## 2018-01-18 DIAGNOSIS — H43.813 PVD (POSTERIOR VITREOUS DETACHMENT), BOTH EYES: ICD-10-CM

## 2018-01-18 DIAGNOSIS — H04.123 INSUFFICIENCY OF TEAR FILM OF BOTH EYES: ICD-10-CM

## 2018-01-18 ASSESSMENT — REFRACTION_WEARINGRX
OD_AXIS: 023
OS_AXIS: 122
OD_SPHERE: -2.00
OS_SPHERE: PLANO
OD_ADD: +2.50
SPECS_TYPE: PAL
OS_CYLINDER: +0.50
OS_ADD: +2.50
OD_CYLINDER: +1.75

## 2018-01-18 ASSESSMENT — CONF VISUAL FIELD
METHOD: COUNTING FINGERS
OS_NORMAL: 1
OD_NORMAL: 1

## 2018-01-18 ASSESSMENT — REFRACTION_MANIFEST
OD_AXIS: 015
OS_ADD: +2.25
OD_SPHERE: -2.00
OD_CYLINDER: +1.25
OD_ADD: +2.25
OS_SPHERE: -0.25
OS_CYLINDER: SPHERE

## 2018-01-18 ASSESSMENT — SLIT LAMP EXAM - LIDS
COMMENTS: BLEPHARITIS
COMMENTS: BLEPHARITIS

## 2018-01-18 ASSESSMENT — TONOMETRY
OS_IOP_MMHG: 13
OD_IOP_MMHG: 20
IOP_METHOD: TONOPEN

## 2018-01-18 ASSESSMENT — EXTERNAL EXAM - LEFT EYE: OS_EXAM: NORMAL

## 2018-01-18 ASSESSMENT — CUP TO DISC RATIO
OD_RATIO: 0.2
OS_RATIO: 0.2

## 2018-01-18 ASSESSMENT — EXTERNAL EXAM - RIGHT EYE: OD_EXAM: NORMAL

## 2018-01-18 ASSESSMENT — VISUAL ACUITY
OD_CC: J1
OS_CC: 20/30
METHOD: SNELLEN - LINEAR
CORRECTION_TYPE: GLASSES
OS_CC: J1
OD_CC: 20/20

## 2018-01-18 NOTE — PROGRESS NOTES
Floaters in past but improved today both eyes.  Some new issues sharpness of distance vision both eyes but overall stable.  No flashes or curtain in vision.  Some mild dryness improved with artificial tear drops.      OcSx:  laser in situ keratomileusis (LASIK) OS (2010)    Gtts:  ATs rarely    A/P:  1. Cataract both eyes  Mild  Happy with vision  Observe    2. Dry eyes both eyes  Increase AT use to three times a day    3.  PVD both eyes  Stable  Retina healthy  Discussed s/s of RTRD    MRx given    F/u 1 year    Complete documentation of historical and exam elements from today's encounter can be found in the full encounter summary report (not reduplicated in this progress note). I personally obtained the chief complaint(s) and history of present illness.  I confirmed and edited as necessary the review of systems, past medical/surgical history, family history, social history, and examination findings as documented by others; and I examined the patient myself. I personally reviewed the relevant tests, images, and reports as documented above. I formulated and edited as necessary the assessment and plan and discussed the findings and management plan with the patient and family.     Scooter Peterson, DO

## 2018-01-18 NOTE — MR AVS SNAPSHOT
After Visit Summary   1/18/2018    Daniel Samayoa    MRN: [id number]           Patient Information     Date Of Birth          1945        Visit Information        Provider Department      1/18/2018 3:00 PM Scooter Peterson, [facility]        Today's Diagnoses     Combined forms of age-related cataract of both eyes    -  1    PVD (posterior vitreous detachment), both eyes        Insufficiency of tear film of both eyes           Follow-ups after your visit        Follow-up notes from your care team     Return in about 1 year (around 1/18/2019).      Who to contact     Please call your clinic at [phone] to:    Ask questions about your health    Make or cancel appointments    Discuss your medicines    Learn about your test results    Speak to your doctor   If you have compliments or concerns about an experience at your clinic, or if you wish to file a complaint, please contact [facility] Patient Relations at [phone] or email us at [email or web address]         Additional Information About Your Visit        MyChart Information     [employer]t gives you secure access to your electronic health record. If you see a primary care provider, you can also send messages to your care team and make appointments. If you have questions, please call your primary care clinic.  If you do not have a primary care provider, please call [phone] and they will assist you.      Quintura is an electronic gateway that provides easy, online access to your medical records. With Quintura, you can request a clinic appointment, read your test results, renew a prescription or communicate with your care team.     To access your existing account, please contact your [facility] or call [phone] for assistance.        Care EveryWhere ID     This is your Care EveryWhere ID. This could be used by other organizations to access your  [place] medical records  [id number]         Blood Pressure from Last 3 Encounters:   11/20/17 122/76   08/25/17 118/70   01/10/17 135/78    Weight from Last 3 Encounters:   11/20/17 91.6 kg (202 lb)   08/25/17 92.5 kg (204 lb)   12/20/16 92.5 kg (203 lb 14.4 oz)              Today, you had the following     No orders found for display       Primary Care Provider Office Phone # Fax #    Henry Cornelius [phone] [address]  [facility] [address]        Equal Access to Services     [facility]: Hadii aad ku hadasho Soomaali, waaxda luqadaha, qaybta kaalmada adeegyada, yanique warren . So [facility] [phone].    ATENCIÓN: Si habla español, tiene a olson disposición servicios gratuitos de asistencia lingüística. [facility] [phone].    We comply with applicable federal civil rights laws and [place] laws. We do not discriminate on the basis of race, color, national origin, age, disability, sex, sexual orientation, or gender identity.            Thank you!     Thank you for choosing [facility]  for your care. Our goal is always to provide you with excellent care. Hearing back from our patients is one way we can continue to improve our services. Please take a few minutes to complete the written survey that you may receive in the mail after your visit with us. Thank you!             Your Updated Medication List - Protect others around you: Learn how to safely use, store and throw away your medicines at www.disposemymeds.org.          This list is accurate as of: 1/18/18  3:38 PM.  Always use your most recent med list.                   Brand Name Dispense Instructions for use Diagnosis    ADVIL 200 MG capsule   Generic drug:  ibuprofen      Take 200 mg by mouth every 4 hours as needed.        aspirin 81 MG EC tablet      Take 81 mg by mouth daily.        B COMPLEX 50 PO      Take  by mouth.        C 1000 PO      Take  by mouth.        calcium-vitamin  D 500-125 MG-UNIT Tabs      Take  by mouth daily.        D 2000 2000 UNITS tablet   Generic drug:  cholecalciferol      Take 1 tablet by mouth daily.        fish oil-omega-3 fatty acids 1000 MG capsule      Take 2 g by mouth daily.        omeprazole 20 MG CR capsule    priLOSEC    30 capsule    Take 1 capsule (20 mg) by mouth daily as needed    Encounter for medication refill       rosuvastatin 5 MG tablet    CRESTOR    90 tablet    Take 1 tablet (5 mg) by mouth At Bedtime    Dyslipidemia       sildenafil 100 MG tablet    VIAGRA    6 tablet    Take 0.5-1 tablets ( mg) by mouth daily as needed for erectile dysfunction Take 30 min to 4 hours before intercourse.  Never use with nitroglycerin, terazosin or doxazosin.    Erectile dysfunction following radical prostatectomy       TUMS 500 MG chewable tablet   Generic drug:  calcium carbonate      Take 2 chew tab by mouth daily

## 2018-02-20 ENCOUNTER — OFFICE VISIT (OUTPATIENT)
Dept: FAMILY MEDICINE | Facility: CLINIC | Age: 73
End: 2018-02-20

## 2018-02-20 VITALS
DIASTOLIC BLOOD PRESSURE: 74 MMHG | RESPIRATION RATE: 12 BRPM | SYSTOLIC BLOOD PRESSURE: 118 MMHG | WEIGHT: 197.4 LBS | HEART RATE: 64 BPM | BODY MASS INDEX: 28.26 KG/M2 | HEIGHT: 70 IN

## 2018-02-20 DIAGNOSIS — K21.9 GASTROESOPHAGEAL REFLUX DISEASE WITHOUT ESOPHAGITIS: Primary | ICD-10-CM

## 2018-02-20 DIAGNOSIS — N52.9 ERECTILE DYSFUNCTION, UNSPECIFIED ERECTILE DYSFUNCTION TYPE: ICD-10-CM

## 2018-02-20 DIAGNOSIS — E78.5 DYSLIPIDEMIA: ICD-10-CM

## 2018-02-20 PROCEDURE — 99213 OFFICE O/P EST LOW 20 MIN: CPT | Performed by: FAMILY MEDICINE

## 2018-02-20 RX ORDER — ROSUVASTATIN CALCIUM 5 MG/1
TABLET, COATED ORAL
Qty: 90 TABLET | Refills: 3 | Status: SHIPPED | OUTPATIENT
Start: 2018-02-20 | End: 2019-02-27

## 2018-02-20 RX ORDER — SILDENAFIL CITRATE 20 MG/1
TABLET ORAL
Qty: 30 TABLET | Refills: 3 | Status: SHIPPED | OUTPATIENT
Start: 2018-02-20 | End: 2018-05-25

## 2018-02-20 NOTE — MR AVS SNAPSHOT
"              After Visit Summary   2/20/2018    Daniel Samayoa    MRN: [id number]           Patient Information     Date Of Birth          1945        Visit Information        Provider Department      2/20/2018 10:45 AM Henry Cornelius MD [facility]        Today's Diagnoses     Gastroesophageal reflux disease without esophagitis    -  1    Erectile dysfunction, unspecified erectile dysfunction type           Follow-ups after your visit        Who to contact     If you have questions or need follow up information about today's clinic visit or your schedule please contact [facility] directly at [phone].  Normal or non-critical lab and imaging results will be communicated to you by Pathgatherhart, letter or phone within 4 business days after the clinic has received the results. If you do not hear from us within 7 days, please contact the clinic through Skillsharet or phone. If you have a critical or abnormal lab result, we will notify you by phone as soon as possible.  Submit refill requests through Passado or call your pharmacy and they will forward the refill request to us. Please allow 3 business days for your refill to be completed.          Additional Information About Your Visit        MyChart Information     Passado gives you secure access to your electronic health record. If you see a primary care provider, you can also send messages to your care team and make appointments. If you have questions, please call your primary care clinic.  If you do not have a primary care provider, please call [phone] and they will assist you.        Care EveryWhere ID     This is your Care EveryWhere ID. This could be used by other organizations to access your [place] medical records  [id number]        Your Vitals Were     Pulse Respirations Height BMI (Body Mass Index)          64 12 1.765 m (5' 9.5\") 28.73 kg/m2         Blood Pressure from Last 3 Encounters:   02/20/18 118/74   11/20/17 " 122/76   08/25/17 118/70    Weight from Last 3 Encounters:   02/20/18 89.5 kg (197 lb 6.4 oz)   11/20/17 91.6 kg (202 lb)   08/25/17 92.5 kg (204 lb)              Today, you had the following     No orders found for display         Today's Medication Changes          These changes are accurate as of 2/20/18  6:51 PM.  If you have any questions, ask your nurse or doctor.               Start taking these medicines.        Dose/Directions    sildenafil 20 MG tablet   Commonly known as:  REVATIO   Used for:  Erectile dysfunction, unspecified erectile dysfunction type   Started by:  Henry Cornelius MD        Take 1 tablet (20 mg) by mouth three times daily for pulmonary hypertension.  Never use with nitroglycerin, terazosin or doxazosin.   Quantity:  30 tablet   Refills:  3         These medicines have changed or have updated prescriptions.        Dose/Directions    rosuvastatin 5 MG tablet   Commonly known as:  CRESTOR   This may have changed:  See the new instructions.   Used for:  Dyslipidemia   Changed by:  Henry Cornelius MD        TAKE 1 TABLET (5 MG) BY MOUTH AT BEDTIME   Quantity:  90 tablet   Refills:  3            Where to get your medicines      These medications were sent to [facility] [id number] [facility] [place] - [address]  [address] [place] [address]     Phone:  [phone]     omeprazole 20 MG CR capsule    rosuvastatin 5 MG tablet         Some of these will need a paper prescription and others can be bought over the counter.  Ask your nurse if you have questions.     Bring a paper prescription for each of these medications     sildenafil 20 MG tablet                Primary Care Provider Office Phone # Fax #    Henry Cornelius -[phone] [address]  [facility] [address]        Equal Access to Services     CHARLETTE VILA AH: Zayda Foreman, chari lupearl, qaybta kaalmason chauhan, yanique schafer. So [facility]  [phone].    ATENCIÓN: Si antonio sarabia, tiene a olson disposición servicios gratuitos de asistencia lingüística. Peggy wyatt [phone].    We comply with applicable federal civil rights laws and [place] laws. We do not discriminate on the basis of race, color, national origin, age, disability, sex, sexual orientation, or gender identity.            Thank you!     Thank you for choosing [facility]  for your care. Our goal is always to provide you with excellent care. Hearing back from our patients is one way we can continue to improve our services. Please take a few minutes to complete the written survey that you may receive in the mail after your visit with us. Thank you!             Your Updated Medication List - Protect others around you: Learn how to safely use, store and throw away your medicines at www.disposemymeds.org.          This list is accurate as of 2/20/18  6:51 PM.  Always use your most recent med list.                   Brand Name Dispense Instructions for use Diagnosis    ADVIL 200 MG capsule   Generic drug:  ibuprofen      Take 200 mg by mouth every 4 hours as needed.        aspirin 81 MG EC tablet      Take 81 mg by mouth daily.        B COMPLEX 50 PO      Take  by mouth.        C 1000 PO      Take by mouth every other day        calcium-vitamin D 500-125 MG-UNIT Tabs      Take  by mouth daily.        fish oil-omega-3 fatty acids 1000 MG capsule      Take 2 g by mouth daily.        omeprazole 20 MG CR capsule    priLOSEC    30 capsule    Take 1 capsule (20 mg) by mouth daily as needed    Gastroesophageal reflux disease without esophagitis       rosuvastatin 5 MG tablet    CRESTOR    90 tablet    TAKE 1 TABLET (5 MG) BY MOUTH AT BEDTIME    Dyslipidemia       sildenafil 100 MG tablet    VIAGRA    6 tablet    Take 0.5-1 tablets ( mg) by mouth daily as needed for erectile dysfunction Take 30 min to 4 hours before intercourse.  Never use with nitroglycerin, terazosin or  doxazosin.    Erectile dysfunction following radical prostatectomy       sildenafil 20 MG tablet    REVATIO    30 tablet    Take 1 tablet (20 mg) by mouth three times daily for pulmonary hypertension.  Never use with nitroglycerin, terazosin or doxazosin.    Erectile dysfunction, unspecified erectile dysfunction type       TUMS 500 MG chewable tablet   Generic drug:  calcium carbonate      Take 2 chew tab by mouth daily

## 2018-02-20 NOTE — TELEPHONE ENCOUNTER
rosuvastatin (CRESTOR) 5 MG    LAST  Med check 2/20/18   last labs(for RX) 8/18/17  Next  appt scheduled =  none  Jina Amin MA    Recent Labs   Lab Test  08/18/17   0843  06/22/16   0909   CHOL  145  152   HDL  58  55   LDL  63  75   TRIG  119  109

## 2018-05-03 ENCOUNTER — OFFICE VISIT (OUTPATIENT)
Dept: FAMILY MEDICINE | Facility: CLINIC | Age: 73
End: 2018-05-03

## 2018-05-03 VITALS
BODY MASS INDEX: 28.09 KG/M2 | WEIGHT: 193 LBS | DIASTOLIC BLOOD PRESSURE: 80 MMHG | SYSTOLIC BLOOD PRESSURE: 122 MMHG | HEART RATE: 52 BPM

## 2018-05-03 DIAGNOSIS — B35.6 TINEA CRURIS: ICD-10-CM

## 2018-05-03 DIAGNOSIS — Z76.89 ENCOUNTER TO ESTABLISH CARE WITH NEW DOCTOR: ICD-10-CM

## 2018-05-03 DIAGNOSIS — G62.9 PERIPHERAL POLYNEUROPATHY: Primary | ICD-10-CM

## 2018-05-03 LAB
% GRANULOCYTES: 52.8 % (ref 42.2–75.2)
ERYTHROCYTE [SEDIMENTATION RATE] IN BLOOD: 5 MM/HR (ref 0–15)
HCT VFR BLD AUTO: 46.5 % (ref 39–51)
HEMOGLOBIN: 15.8 G/DL (ref 13.4–17.5)
LYMPHOCYTES NFR BLD AUTO: 37.9 % (ref 20.5–51.1)
MCH RBC QN AUTO: 32.2 PG (ref 27–31)
MCHC RBC AUTO-ENTMCNC: 34 G/DL (ref 33–37)
MCV RBC AUTO: 94.6 FL (ref 80–100)
MONOCYTES NFR BLD AUTO: 9.3 % (ref 1.7–9.3)
PLATELET # BLD AUTO: 169 K/UL (ref 140–450)
RBC # BLD AUTO: 4.91 X10/CMM (ref 4.2–5.9)
WBC # BLD AUTO: 6.9 X10/CMM (ref 3.8–11)

## 2018-05-03 PROCEDURE — 99214 OFFICE O/P EST MOD 30 MIN: CPT | Performed by: FAMILY MEDICINE

## 2018-05-03 PROCEDURE — 85651 RBC SED RATE NONAUTOMATED: CPT | Performed by: FAMILY MEDICINE

## 2018-05-03 PROCEDURE — 85025 COMPLETE CBC W/AUTO DIFF WBC: CPT | Performed by: FAMILY MEDICINE

## 2018-05-03 PROCEDURE — 36415 COLL VENOUS BLD VENIPUNCTURE: CPT | Performed by: FAMILY MEDICINE

## 2018-05-03 NOTE — NURSING NOTE
Patient states that he comes from a strong family history of cancer. Concerned about lump by rectum. Neymar Garcia LPN      5/3/2018    12:40 PM

## 2018-05-03 NOTE — PATIENT INSTRUCTIONS
You may use Tucks Pads to the rectal area for itching. Also recommend showering shortly after exercise and using over the counter anti-fungal cream.

## 2018-05-03 NOTE — PROGRESS NOTES
Problem(s) Oriented visit        SUBJECTIVE:                                                    Daniel Samayoa is a 72 year old male who presents to clinic today for establishing care with a new physician. He is a very healthy man. He takes medication for hypercholesterolemia regularly, but otherwise only uses prn omeprazole and takes nutritional supplements.    Today he complains of feet getting very hot at night, more pronounced if he has a glass of beer or wine. He denies any history of gout. He exercises a lot and tries to air out his feet following the soccer practices and games.    He plays soccer and wears tight shorts. He gets quite sweaty. He notices itching and pain in his perianal region.      Problem list, Medication list, Allergies, and Medical/Social/Surgical histories reviewed in EPIC and updated as appropriate.   Additional history: as documented    ROS:  5 point ROS completed and negative except noted above, including Gen, CV, Resp, GI, MS      Histories:   Patient Active Problem List   Diagnosis     Prostate cancer (H)     Dyslipidemia     Health Care Home     Advance Care Planning     Cataracts, both eyes     PVD (posterior vitreous detachment), both eyes     Past Surgical History:   Procedure Laterality Date     APPENDECTOMY  1980     LASER SURGERY OF EYE      correction left lense     lasic       LASIK  2 -3 years ago     left eye only     LEG SURGERY  2004    [facility]     PROSTATECTOMY RETROPUBIC RADICAL  2002    Radical at [place]       Social History   Substance Use Topics     Smoking status: Never Smoker     Smokeless tobacco: Never Used     Alcohol use 1.0 oz/week     2 Cans of beer per week      Comment: little     Family History   Problem Relation Age of Onset     Hypertension Mother      DIABETES Mother      Lung Cancer Father      Pancreatic Cancer Brother      Rectal Cancer Sister      Glaucoma No family hx of      Macular Degeneration No family hx of            OBJECTIVE:                                                     /80  Pulse 52  Wt 87.5 kg (193 lb)  BMI 28.09 kg/m2  Body mass index is 28.09 kg/(m^2).   GENERAL APPEARANCE: Alert, no acute distress  NECK: No adenopathy,masses or thyromegaly  RESP: lungs clear to auscultation   CV: normal rate, regular rhythm, no murmur or gallop   (male): redness and excoriation is seen in the scrotal and perirectal areas  MS: extremities normal, no peripheral edema, normal pulses, current normal sensation to light touch.  NEURO: Alert, oriented, speech and mentation normal  PSYCH: mentation appears normal, affect and mood normal   Labs Resulted Today:   Results for orders placed or performed in visit on 05/03/18   Hemoglobin A1C ([facility])   Result Value Ref Range    Hemoglobin A1C 5.9 (H) 4.8 - 5.6 %    Narrative    Performed at:  01 - [facility] [address], [place], [place]  [id number]  : Abhishek Milian MD, Phone:  [phone]   CBC with Diff/Plt ([facility])   Result Value Ref Range    WBC x10/cmm 6.9 3.8 - 11.0 x10/cmm    % Lymphocytes 37.9 20.5 - 51.1 %    % Monocytes 9.3 1.7 - 9.3 %    % Granulocytes 52.8 42.2 - 75.2 %    RBC x10/cmm 4.91 4.2 - 5.9 x10/cmm    Hemoglobin 15.8 13.4 - 17.5 g/dl    Hematocrit 46.5 39 - 51 %    MCV 94.6 80 - 100 fL    MCH 32.2 (A) 27.0 - 31.0 pg    MCHC 34.0 33.0 - 37.0 g/dL    Platelet Count 169 140 - 450 K/uL   Sed Rate Westergren ([facility])   Result Value Ref Range    Sed Rate 5 0 - 15 mm/hr   Uric Acid  Serum ([facility])   Result Value Ref Range    Uric Acid 6.4 3.7 - 8.6 mg/dL    Narrative    Performed at:  01 - [facility] [address], [place], [place]  [id number]  : Abhishek Milian MD, Phone:  [phone]     ASSESSMENT/PLAN:                                                        Daniel was seen today for musculoskeletal problem, musculoskeletal problem and mass.    Diagnoses and all orders for this visit:    Peripheral polyneuropathy, etiology uncertain  -     Hemoglobin A1C ([facility])  -      CBC with Diff/Plt ([facility])  -     Sed Rate Westergren ([facility])  -     Uric Acid  Serum ([facility])  Treat any identifiable sources if labs are abnormal.     Tinea cruris  See instructions below.      Patient Instructions   You may use Tucks Pads to the rectal area for itching. Also recommend showering shortly after exercise and using over the counter anti-fungal cream.    >25 min spent with patient, greater than 50% spent on discussion/education/planning, etc. About The primary encounter diagnosis was Peripheral polyneuropathy. Diagnoses of Tinea cruris and Encounter to establish care with new doctor were also pertinent to this visit.      The following health maintenance items are reviewed in Epic and correct as of today:  Health Maintenance   Topic Date Due     EYE EXAM Q1 YEAR  01/18/2019     FALL RISK ASSESSMENT  01/18/2019     ADVANCE DIRECTIVE PLANNING Q5 YRS  06/24/2020     COLONOSCOPY Q5 YR  09/21/2020     LIPID SCREEN Q5 YR MALE (SYSTEM ASSIGNED)  08/18/2022     TETANUS IMMUNIZATION (SYSTEM ASSIGNED)  08/25/2027     PNEUMOCOCCAL  Completed     INFLUENZA VACCINE  Completed     AORTIC ANEURYSM SCREENING (SYSTEM ASSIGNED)  Completed     HEPATITIS C SCREENING  Completed       Thu Tinoco MD  [facility]  Family Practice  [facility]  [phone]    For any issues my office # is [phone]

## 2018-05-03 NOTE — MR AVS SNAPSHOT
After Visit Summary   5/3/2018    Daniel Samayoa    MRN: [id number]           Patient Information     Date Of Birth          1945        Visit Information        Provider Department      5/3/2018 1:00 PM Thu Tinoco MD [facility]        Today's Diagnoses     Peripheral polyneuropathy    -  1    Tinea cruris          Care Instructions    You may use Tucks Pads to the rectal area for itching. Also recommend showering shortly after exercise and using over the counter anti-fungal cream.          Follow-ups after your visit        Who to contact     If you have questions or need follow up information about today's clinic visit or your schedule please contact [facility] directly at [phone].  Normal or non-critical lab and imaging results will be communicated to you by Elasticahart, letter or phone within 4 business days after the clinic has received the results. If you do not hear from us within 7 days, please contact the clinic through Everpayt or phone. If you have a critical or abnormal lab result, we will notify you by phone as soon as possible.  Submit refill requests through ColorChip or call your pharmacy and they will forward the refill request to us. Please allow 3 business days for your refill to be completed.          Additional Information About Your Visit        MyChart Information     ColorChip gives you secure access to your electronic health record. If you see a primary care provider, you can also send messages to your care team and make appointments. If you have questions, please call your primary care clinic.  If you do not have a primary care provider, please call [phone] and they will assist you.        Care EveryWhere ID     This is your Care EveryWhere ID. This could be used by other organizations to access your [place] medical records  [id number]        Your Vitals Were     Pulse BMI (Body Mass Index)                52 28.09 kg/m2            Blood Pressure from Last 3 Encounters:   05/03/18 122/80   02/20/18 118/74   11/20/17 122/76    Weight from Last 3 Encounters:   05/03/18 87.5 kg (193 lb)   02/20/18 89.5 kg (197 lb 6.4 oz)   11/20/17 91.6 kg (202 lb)              We Performed the Following     CBC with Diff/Plt ([facility])     Hemoglobin A1C ([facility])     Sed Rate Westergren ([facility])     Uric Acid  Serum ([facility])        Primary Care Provider Office Phone # Fax #    Henry Cornelius [phone] [address] [facility] [address]        Equal Access to Services     MARICRUZ VILA : Hadii aad ku hadasho Soomaali, waaxda luqadaha, qaybta kaalmada adeegyada, waxbryan bishopin archie warren . So [facility] [phone].    ATENCIÓN: Si habla español, tiene a olson disposición servicios gratuitos de asistencia lingüística. Ll[facility] [phone].    We comply with applicable federal civil rights laws and [place] laws. We do not discriminate on the basis of race, color, national origin, age, disability, sex, sexual orientation, or gender identity.            Thank you!     Thank you for choosing [facility]  for your care. Our goal is always to provide you with excellent care. Hearing back from our patients is one way we can continue to improve our services. Please take a few minutes to complete the written survey that you may receive in the mail after your visit with us. Thank you!             Your Updated Medication List - Protect others around you: Learn how to safely use, store and throw away your medicines at www.disposemymeds.org.          This list is accurate as of 5/3/18  1:33 PM.  Always use your most recent med list.                   Brand Name Dispense Instructions for use Diagnosis    ADVIL 200 MG capsule   Generic drug:  ibuprofen      Take 200 mg by mouth every 4 hours as needed.        aspirin 81 MG EC tablet      Take 81 mg by mouth daily.        B COMPLEX 50 PO      Take  by mouth.        C 1000 PO       Take by mouth every other day        calcium-vitamin D 500-125 MG-UNIT Tabs      Take  by mouth daily.        fish oil-omega-3 fatty acids 1000 MG capsule      Take 2 g by mouth daily.        omeprazole 20 MG CR capsule    priLOSEC    30 capsule    Take 1 capsule (20 mg) by mouth daily as needed    Gastroesophageal reflux disease without esophagitis       rosuvastatin 5 MG tablet    CRESTOR    90 tablet    TAKE 1 TABLET (5 MG) BY MOUTH AT BEDTIME    Dyslipidemia       sildenafil 100 MG tablet    VIAGRA    6 tablet    Take 0.5-1 tablets ( mg) by mouth daily as needed for erectile dysfunction Take 30 min to 4 hours before intercourse.  Never use with nitroglycerin, terazosin or doxazosin.    Erectile dysfunction following radical prostatectomy       sildenafil 20 MG tablet    REVATIO    30 tablet    Take 1 tablet (20 mg) by mouth three times daily for pulmonary hypertension.  Never use with nitroglycerin, terazosin or doxazosin.    Erectile dysfunction, unspecified erectile dysfunction type       TUMS 500 MG chewable tablet   Generic drug:  calcium carbonate      Take 2 chew tab by mouth daily

## 2018-05-04 LAB
HBA1C MFR BLD: 5.9 % (ref 4.8–5.6)
URATE SERPL-MCNC: 6.4 MG/DL (ref 3.7–8.6)

## 2018-05-22 DIAGNOSIS — N52.9 ERECTILE DYSFUNCTION, UNSPECIFIED ERECTILE DYSFUNCTION TYPE: ICD-10-CM

## 2018-05-25 RX ORDER — SILDENAFIL CITRATE 20 MG/1
TABLET ORAL
Qty: 30 TABLET | Refills: 3 | Status: SHIPPED | OUTPATIENT
Start: 2018-05-25 | End: 2018-05-31

## 2018-05-25 RX ORDER — SILDENAFIL CITRATE 20 MG/1
TABLET ORAL
Qty: 90 TABLET | Refills: 0 | OUTPATIENT
Start: 2018-05-25

## 2018-05-31 NOTE — TELEPHONE ENCOUNTER
Received another request from [facility] for this prescription.  Patient has decided to use [facility].  Prescription was sent to them, but with wrong directions.  [facility] was called and the patient has picked up the prescription already.  He paid for #90.  Called the patient, he is aware directions where wrong and will take as he did in the past.   He will call us when he needs next refill so [facility] does not reinitiate wrong prescription.    EPIC was updated

## 2018-06-07 RX ORDER — SILDENAFIL CITRATE 20 MG/1
TABLET ORAL
Qty: 30 TABLET | Refills: 3 | COMMUNITY
Start: 2018-06-07 | End: 2018-08-28

## 2018-06-19 ENCOUNTER — MEDICAL CORRESPONDENCE (OUTPATIENT)
Dept: FAMILY MEDICINE | Facility: CLINIC | Age: 73
End: 2018-06-19

## 2018-08-20 DIAGNOSIS — G62.9 PERIPHERAL POLYNEUROPATHY: ICD-10-CM

## 2018-08-20 DIAGNOSIS — Z79.899 ENCOUNTER FOR LONG-TERM (CURRENT) USE OF MEDICATIONS: ICD-10-CM

## 2018-08-20 DIAGNOSIS — E78.5 DYSLIPIDEMIA: Primary | ICD-10-CM

## 2018-08-20 DIAGNOSIS — C61 PROSTATE CANCER (H): ICD-10-CM

## 2018-08-20 PROCEDURE — 36415 COLL VENOUS BLD VENIPUNCTURE: CPT | Performed by: FAMILY MEDICINE

## 2018-08-21 LAB
ALBUMIN SERPL-MCNC: 4.2 G/DL (ref 3.5–4.8)
ALBUMIN/GLOB SERPL: 2.1 {RATIO} (ref 1.2–2.2)
ALP SERPL-CCNC: 70 IU/L (ref 39–117)
ALT SERPL-CCNC: 17 IU/L (ref 0–44)
AST SERPL-CCNC: 16 IU/L (ref 0–40)
BILIRUB SERPL-MCNC: 0.5 MG/DL (ref 0–1.2)
BUN SERPL-MCNC: 24 MG/DL (ref 8–27)
BUN/CREATININE RATIO: 24 (ref 10–24)
CALCIUM SERPL-MCNC: 9.5 MG/DL (ref 8.6–10.2)
CHLORIDE SERPLBLD-SCNC: 103 MMOL/L (ref 96–106)
CHOLEST SERPL-MCNC: 165 MG/DL (ref 100–199)
CREAT SERPL-MCNC: 0.98 MG/DL (ref 0.76–1.27)
EGFR IF AFRICN AM: 90 ML/MIN/1.73
EGFR IF NONAFRICN AM: 78 ML/MIN/1.73
GLOBULIN, TOTAL: 2 G/DL (ref 1.5–4.5)
GLUCOSE SERPL-MCNC: 109 MG/DL (ref 65–99)
HDLC SERPL-MCNC: 62 MG/DL
LDL/HDL RATIO: 1.4 RATIO (ref 0–3.6)
LDLC SERPL CALC-MCNC: 87 MG/DL (ref 0–99)
POTASSIUM SERPL-SCNC: 4.6 MMOL/L (ref 3.5–5.2)
PROT SERPL-MCNC: 6.2 G/DL (ref 6–8.5)
PSA NG/ML: <0.1 NG/ML (ref 0–4)
SODIUM SERPL-SCNC: 143 MMOL/L (ref 134–144)
TOTAL CO2: 30 MMOL/L (ref 20–29)
TRIGL SERPL-MCNC: 78 MG/DL (ref 0–149)
URATE SERPL-MCNC: 5.7 MG/DL (ref 3.7–8.6)
VLDLC SERPL CALC-MCNC: 16 MG/DL (ref 5–40)

## 2018-08-28 ENCOUNTER — OFFICE VISIT (OUTPATIENT)
Dept: FAMILY MEDICINE | Facility: CLINIC | Age: 73
End: 2018-08-28

## 2018-08-28 VITALS
OXYGEN SATURATION: 97 % | HEIGHT: 70 IN | WEIGHT: 195 LBS | SYSTOLIC BLOOD PRESSURE: 110 MMHG | RESPIRATION RATE: 16 BRPM | DIASTOLIC BLOOD PRESSURE: 78 MMHG | HEART RATE: 62 BPM | BODY MASS INDEX: 27.92 KG/M2

## 2018-08-28 DIAGNOSIS — J41.1 BRONCHITIS, MUCOPURULENT RECURRENT (H): ICD-10-CM

## 2018-08-28 DIAGNOSIS — N52.9 ERECTILE DYSFUNCTION, UNSPECIFIED ERECTILE DYSFUNCTION TYPE: ICD-10-CM

## 2018-08-28 DIAGNOSIS — M77.41 METATARSALGIA OF BOTH FEET: ICD-10-CM

## 2018-08-28 DIAGNOSIS — Z00.00 MEDICARE ANNUAL WELLNESS VISIT, SUBSEQUENT: Primary | ICD-10-CM

## 2018-08-28 DIAGNOSIS — M77.42 METATARSALGIA OF BOTH FEET: ICD-10-CM

## 2018-08-28 PROCEDURE — 99213 OFFICE O/P EST LOW 20 MIN: CPT | Mod: 25 | Performed by: FAMILY MEDICINE

## 2018-08-28 PROCEDURE — G0439 PPPS, SUBSEQ VISIT: HCPCS | Performed by: FAMILY MEDICINE

## 2018-08-28 RX ORDER — AZITHROMYCIN 250 MG/1
TABLET, FILM COATED ORAL
Qty: 6 TABLET | Refills: 3 | Status: SHIPPED | OUTPATIENT
Start: 2018-08-28 | End: 2019-10-29

## 2018-08-28 RX ORDER — SILDENAFIL CITRATE 20 MG/1
TABLET ORAL
Qty: 90 TABLET | Refills: 1 | Status: SHIPPED | OUTPATIENT
Start: 2018-08-28 | End: 2019-08-29

## 2018-08-28 NOTE — PROGRESS NOTES
SUBJECTIVE:   Daniel Samayoa is a 72 year old male who presents for Preventive Visit.  ISSUES TO ADDRESS TODAY:  1. Plantar surface of both feet, worse after playing soccer on artificial turf.  2. ED: desires refill of sildenafil. Works well.     Are you in the first 12 months of your Medicare Part B coverage?  No    Healthy Habits:    Do you get at least three servings of calcium containing foods daily (dairy, green leafy vegetables, etc.)? yes    Amount of exercise or daily activities, outside of work: 3-4 day(s) per week    Problems taking medications regularly No    Medication side effects: No    Have you had an eye exam in the past two years? yes    Do you see a dentist twice per year? yes    Do you have sleep apnea, excessive snoring or daytime drowsiness?no      Ability to successfully perform activities of daily living: Yes, no assistance needed    Home safety:  none identified     Hearing impairment: No    Fall risk:No  Fallen 2 or more times in the past year?: No  Any fall with injury in the past year?: No  Hearing screen pass in both ears    COGNITIVE SCREEN  1) Repeat 3 items (Leader, Season, Table)    2) Clock draw: NORMAL  3) 3 item recall: Recalls 3 objects  Results: 3 items recalled: COGNITIVE IMPAIRMENT LESS LIKELY    Mini-CogTM Copyright S Jese. Licensed by the author for use in [facility]; reprinted with permission (rafa@.[facility]). All rights reserved.          Reviewed and updated as needed this visit by clinical staff  Tobacco  Allergies  Meds         Reviewed and updated as needed this visit by Provider        Social History   Substance Use Topics     Smoking status: Never Smoker     Smokeless tobacco: Never Used     Alcohol use 1.0 oz/week     2 Cans of beer per week      Comment: little       If you drink alcohol do you typically have >3 drinks per day or >7 drinks per week? No                        Today's PHQ-2 Score:   PHQ-2 ( 1999 Pfizer) 8/28/2018 8/25/2017   Q1:  Little interest or pleasure in doing things 0 0   Q2: Feeling down, depressed or hopeless 0 0   PHQ-2 Score 0 0       Do you feel safe in your environment - Yes    Do you have a Health Care Directive?: Yes: Advance Directive has been received and scanned.    Current providers sharing in care for this patient include:   Patient Care Team:  Thu Tinoco MD as PCP - General (Family Practice)  Vickie Mar, RN as Nurse Coordinator (Neurology)    The following health maintenance items are reviewed in Epic and correct as of today:  Health Maintenance   Topic Date Due     SPIROMETRY ONETIME  1945     COPD ACTION PLAN Q1 YR  1945     INFLUENZA VACCINE (1) 09/01/2018     EYE EXAM Q1 YEAR  01/18/2019     FALL RISK ASSESSMENT  01/18/2019     PHQ-2 Q1 YR  08/28/2019     ADVANCE DIRECTIVE PLANNING Q5 YRS  06/24/2020     COLONOSCOPY Q5 YR  09/21/2020     LIPID SCREEN Q5 YR MALE (SYSTEM ASSIGNED)  08/20/2023     TETANUS IMMUNIZATION (SYSTEM ASSIGNED)  08/25/2027     PNEUMOCOCCAL  Completed     AORTIC ANEURYSM SCREENING (SYSTEM ASSIGNED)  Completed     HEPATITIS C SCREENING  Completed     Labs reviewed in EPIC  BP Readings from Last 3 Encounters:   08/28/18 110/78   05/03/18 122/80   02/20/18 118/74    Wt Readings from Last 3 Encounters:   08/28/18 88.5 kg (195 lb)   05/03/18 87.5 kg (193 lb)   02/20/18 89.5 kg (197 lb 6.4 oz)                  Patient Active Problem List   Diagnosis     Prostate cancer (H)     Dyslipidemia     Health Care Home     Advance Care Planning     Cataracts, both eyes     PVD (posterior vitreous detachment), both eyes     Past Surgical History:   Procedure Laterality Date     APPENDECTOMY  1980     LASER SURGERY OF EYE      correction left lense     lasic       LASIK  2 -3 years ago     left eye only     LEG SURGERY  2004    [facility]     PROSTATECTOMY RETROPUBIC RADICAL  2002    Radical at [place]       Social History   Substance Use Topics     Smoking status: Never Smoker      Smokeless tobacco: Never Used     Alcohol use 1.0 oz/week     2 Cans of beer per week      Comment: little     Family History   Problem Relation Age of Onset     Hypertension Mother      Diabetes Mother      Lung Cancer Father      Pancreatic Cancer Brother      Rectal Cancer Sister      Glaucoma No family hx of      Macular Degeneration No family hx of          Current Outpatient Prescriptions   Medication Sig Dispense Refill     Ascorbic Acid (C 1000 PO) Take by mouth every other day        aspirin 81 MG EC tablet Take 81 mg by mouth daily.       azithromycin (ZITHROMAX) 250 MG tablet Two tablets first day, then one tablet daily for four days. 6 tablet 3     B Complex Vitamins (B COMPLEX 50 PO) Take  by mouth.       calcium carbonate (TUMS) 500 MG chewable tablet Take 2 chew tab by mouth daily       Calcium-Vitamin D (CALCIUM 500 +D) 500-125 MG-UNIT TABS Take  by mouth daily.       fish oil-omega-3 fatty acids (FISH OIL) 1000 MG capsule Take 2 g by mouth daily.       Ibuprofen (ADVIL) 200 MG capsule Take 200 mg by mouth every 4 hours as needed.       omeprazole (PRILOSEC) 20 MG CR capsule Take 1 capsule (20 mg) by mouth daily as needed 30 capsule 11     rosuvastatin (CRESTOR) 5 MG tablet TAKE 1 TABLET (5 MG) BY MOUTH AT BEDTIME 90 tablet 3     sildenafil (REVATIO) 20 MG tablet TAKE UP TO THREE TABLETS AS NEEDED FOR SEXUAL ACTIVITY.  Never use with nitroglycerin, terazosin or doxazosin. 90 tablet 1     [DISCONTINUED] sildenafil (REVATIO) 20 MG tablet TAKE UP TO THREE TABLETS AS NEEDED FOR SEXUAL ACTIVITY.  Never use with nitroglycerin, terazosin or doxazosin. 30 tablet 3     Allergies   Allergen Reactions     Simvastatin Other (See Comments)     Per paper chart medical records -- patient tried zocor in 7/2004 and had myalgias so discontinued.  3/2014 tried again -- myalgias again.     Recent Labs   Lab Test  08/20/18   1242  05/03/18   1449  08/18/17   0843  10/18/16   1106  06/22/16   0909   A1C   --   5.9*  5.8*   "5.7*   --    LDL  87   --   63   --   75   HDL  62   --   58   --   55   TRIG  78   --   119   --   109   ALT  17   --   21   --   19   CR  0.98   --   0.80   --   0.83   POTASSIUM  4.6   --   4.0   --   4.2            ROS:  Constitutional, HEENT, cardiovascular, pulmonary, GI, , musculoskeletal, neuro, skin, endocrine and psych systems are negative, except as otherwise noted.    OBJECTIVE:   /78  Pulse 62  Resp 16  Ht 1.765 m (5' 9.5\")  Wt 88.5 kg (195 lb)  SpO2 97%  BMI 28.38 kg/m2 Estimated body mass index is 28.38 kg/(m^2) as calculated from the following:    Height as of this encounter: 1.765 m (5' 9.5\").    Weight as of this encounter: 88.5 kg (195 lb).  EXAM:   GENERAL: healthy, alert and no distress  EYES: Eyes grossly normal to inspection, PERRL and conjunctivae and sclerae normal  HENT: ear canals and TM's normal, nose and mouth without ulcers or lesions  NECK: no adenopathy, no asymmetry, masses, or scars and thyroid normal to palpation  RESP: lungs clear to auscultation - no rales, rhonchi or wheezes  CV: regular rate and rhythm, normal S1 S2, no S3 or S4, no murmur, click or rub, no peripheral edema and peripheral pulses strong  ABDOMEN: soft, nontender, no hepatosplenomegaly, no masses and bowel sounds normal  MS: no gross musculoskeletal defects noted, no edema  Normal sensation on the plantar surface of both feet, there is a symmetrically located corn on the plantar surface at the distal metatarsal region between the 2nd and 3rd toes  SKIN: no suspicious lesions or rashes  NEURO: Normal strength and tone, mentation intact and speech normal  PSYCH: mentation appears normal, affect normal/bright        ASSESSMENT / PLAN:   Daniel was seen today for physical and hearing screening.    Diagnoses and all orders for this visit:    Medicare annual wellness visit, subsequent  Patient is a healthy appearing 72 year old male. Imms are UTD, Recommend Shingrix, healthy weight, eating, exercise " "habits discussed.  Labs from last week are reviewed today and printed for him.    Bronchitis, mucopurulent recurrent (H)  -     azithromycin (ZITHROMAX) 250 MG tablet; Two tablets first day, then one tablet daily for four days.  He will take this with him when he travels to [place] in case of persistent cough.     Metatarsalgia of both feet  Recommend shoe inserts to offload the pressure from the distal metatarsals.     Erectile dysfunction, unspecified erectile dysfunction type  -     sildenafil (REVATIO) 20 MG tablet; TAKE UP TO THREE TABLETS AS NEEDED FOR SEXUAL ACTIVITY.  Never use with nitroglycerin, terazosin or doxazosin.        End of Life Planning:  Patient currently has an advanced directive: No.  I have verified the patient's ablity to prepare an advanced directive/make health care decisions.  Literature was provided to assist patient in preparing an advanced directive.    COUNSELING:  Reviewed preventive health counseling, as reflected in patient instructions       Regular exercise       Healthy diet/nutrition       Vision screening       Hearing screening       Colon cancer screening    BP Readings from Last 1 Encounters:   08/28/18 110/78     Estimated body mass index is 28.38 kg/(m^2) as calculated from the following:    Height as of this encounter: 1.765 m (5' 9.5\").    Weight as of this encounter: 88.5 kg (195 lb).           reports that he has never smoked. He has never used smokeless tobacco.      Appropriate preventive services were discussed with this patient, including applicable screening as appropriate for cardiovascular disease, diabetes, osteopenia/osteoporosis, and glaucoma.  As appropriate for age/gender, discussed screening for colorectal cancer, prostate cancer, breast cancer, and cervical cancer. Checklist reviewing preventive services available has been given to the patient.    Reviewed patients plan of care and provided an AVS. The Basic Care Plan (routine screening as documented in " Health Maintenance) for Daniel meets the Care Plan requirement. This Care Plan has been established and reviewed with the Patient.    Counseling Resources:  ATP IV Guidelines  Pooled Cohorts Equation Calculator  Breast Cancer Risk Calculator  FRAX Risk Assessment  ICSI Preventive Guidelines  Dietary Guidelines for Americans, 2010  USDA's MyPlate  ASA Prophylaxis  Lung CA Screening    Thu Tinoco MD  [facility]

## 2018-08-28 NOTE — MR AVS SNAPSHOT
After Visit Summary   8/28/2018    Daniel Samayoa    MRN: [id number]           Patient Information     Date Of Birth          1945        Visit Information        Provider Department      8/28/2018 1:45 PM Thu Tinoco MD [facility]        Today's Diagnoses     Medicare annual wellness visit, subsequent    -  1    Bronchitis, mucopurulent recurrent (H)        Metatarsalgia of both feet        Erectile dysfunction, unspecified erectile dysfunction type          Care Instructions      Preventive Health Recommendations:       Male Ages 65 and over    Yearly exam:             See your health care provider every year in order to  o   Review health changes.   o   Discuss preventive care.    o   Review your medicines if your doctor has prescribed any.    Talk with your health care provider about whether you should have a test to screen for prostate cancer (PSA).    Every 3 years, have a diabetes test (fasting glucose). If you are at risk for diabetes, you should have this test more often.    Every 5 years, have a cholesterol test. Have this test more often if you are at risk for high cholesterol or heart disease.     Every 10 years, have a colonoscopy. Or, have a yearly FIT test (stool test). These exams will check for colon cancer.    Talk to with your health care provider about screening for Abdominal Aortic Aneurysm if you have a family history of AAA or have a history of smoking.  Shots:     Get a flu shot each year.     Get a tetanus shot every 10 years.     Talk to your doctor about your pneumonia vaccines. There are now two you should receive - Pneumovax (PPSV 23) and Prevnar (PCV 13).    Talk to your pharmacist about a shingles vaccine.     Talk to your doctor about the hepatitis B vaccine.  Nutrition:     Eat at least 5 servings of fruits and vegetables each day.     Eat whole-grain bread, whole-wheat pasta and brown rice instead of white grains and rice.     Get  adequate Calcium and Vitamin D.   Lifestyle    Exercise for at least 150 minutes a week (30 minutes a day, 5 days a week). This will help you control your weight and prevent disease.     Limit alcohol to one drink per day.     No smoking.     Wear sunscreen to prevent skin cancer.     See your dentist every six months for an exam and cleaning.     See your eye doctor every 1 to 2 years to screen for conditions such as glaucoma, macular degeneration and cataracts.    FOOT PAIN  For metatarsalgia you may benefit from shoe insert. Recommend shoe insert to offload pressure from the distal metatarsal bones.    For congestion recommend taking Sudafed (pseudoephedrine) decongestant.          Follow-ups after your visit        Who to contact     If you have questions or need follow up information about today's clinic visit or your schedule please contact [facility] directly at [phone].  Normal or non-critical lab and imaging results will be communicated to you by Qeexohart, letter or phone within 4 business days after the clinic has received the results. If you do not hear from us within 7 days, please contact the clinic through Reunifyt or phone. If you have a critical or abnormal lab result, we will notify you by phone as soon as possible.  Submit refill requests through [email or web address] or call your pharmacy and they will forward the refill request to us. Please allow 3 business days for your refill to be completed.          Additional Information About Your Visit        QeexoharWhitevector Information     [email or web address] gives you secure access to your electronic health record. If you see a primary care provider, you can also send messages to your care team and make appointments. If you have questions, please call your primary care clinic.  If you do not have a primary care provider, please call [phone] and they will assist you.        Care EveryWhere ID     This is your Care EveryWhere ID. This could be used by other  "organizations to access your [place] medical records  [id number]        Your Vitals Were     Pulse Respirations Height Pulse Oximetry BMI (Body Mass Index)       62 16 1.765 m (5' 9.5\") 97% 28.38 kg/m2        Blood Pressure from Last 3 Encounters:   08/28/18 110/78   05/03/18 122/80   02/20/18 118/74    Weight from Last 3 Encounters:   08/28/18 88.5 kg (195 lb)   05/03/18 87.5 kg (193 lb)   02/20/18 89.5 kg (197 lb 6.4 oz)              Today, you had the following     No orders found for display         Today's Medication Changes          These changes are accurate as of 8/28/18  2:20 PM.  If you have any questions, ask your nurse or doctor.               Start taking these medicines.        Dose/Directions    azithromycin 250 MG tablet   Commonly known as:  ZITHROMAX   Used for:  Bronchitis, mucopurulent recurrent (H)   Started by:  Thu Tinoco MD        Two tablets first day, then one tablet daily for four days.   Quantity:  6 tablet   Refills:  3            Where to get your medicines      These medications were sent to [id number] - [place], [place] - [address]  [address], [facility] [address]     Phone:  [phone]     azithromycin 250 MG tablet    sildenafil 20 MG tablet                Primary Care Provider Office Phone # Fax #    Thu Tinoco [phone] [address] [place] [address]        Equal Access to Services     [facility] AH: Hadii delmer waller hadasho Sokatherine, waaxda luqadaha, qaybta kaalmada adeegyada, waxay anne schafer. So [facility] [phone].    ATENCIÓN: Si habla español, tiene a olson disposición servicios gratuitos de asistencia lingüística. Llame al [phone].    We comply with applicable federal civil rights laws and [place] laws. We do not discriminate on the basis of race, color, national origin, age, disability, sex, sexual orientation, or gender identity.            Thank " you!     Thank you for choosing [facility]  for your care. Our goal is always to provide you with excellent care. Hearing back from our patients is one way we can continue to improve our services. Please take a few minutes to complete the written survey that you may receive in the mail after your visit with us. Thank you!             Your Updated Medication List - Protect others around you: Learn how to safely use, store and throw away your medicines at www.disposemymeds.org.          This list is accurate as of 8/28/18  2:20 PM.  Always use your most recent med list.                   Brand Name Dispense Instructions for use Diagnosis    ADVIL 200 MG capsule   Generic drug:  ibuprofen      Take 200 mg by mouth every 4 hours as needed.        aspirin 81 MG EC tablet      Take 81 mg by mouth daily.        azithromycin 250 MG tablet    ZITHROMAX    6 tablet    Two tablets first day, then one tablet daily for four days.    Bronchitis, mucopurulent recurrent (H)       B COMPLEX 50 PO      Take  by mouth.        C 1000 PO      Take by mouth every other day        calcium-vitamin D 500-125 MG-UNIT Tabs      Take  by mouth daily.        fish oil-omega-3 fatty acids 1000 MG capsule      Take 2 g by mouth daily.        omeprazole 20 MG CR capsule    priLOSEC    30 capsule    Take 1 capsule (20 mg) by mouth daily as needed    Gastroesophageal reflux disease without esophagitis       rosuvastatin 5 MG tablet    CRESTOR    90 tablet    TAKE 1 TABLET (5 MG) BY MOUTH AT BEDTIME    Dyslipidemia       sildenafil 20 MG tablet    REVATIO    90 tablet    TAKE UP TO THREE TABLETS AS NEEDED FOR SEXUAL ACTIVITY.  Never use with nitroglycerin, terazosin or doxazosin.    Erectile dysfunction, unspecified erectile dysfunction type       TUMS 500 MG chewable tablet   Generic drug:  calcium carbonate      Take 2 chew tab by mouth daily

## 2018-08-28 NOTE — PATIENT INSTRUCTIONS
Preventive Health Recommendations:       Male Ages 65 and over    Yearly exam:             See your health care provider every year in order to  o   Review health changes.   o   Discuss preventive care.    o   Review your medicines if your doctor has prescribed any.    Talk with your health care provider about whether you should have a test to screen for prostate cancer (PSA).    Every 3 years, have a diabetes test (fasting glucose). If you are at risk for diabetes, you should have this test more often.    Every 5 years, have a cholesterol test. Have this test more often if you are at risk for high cholesterol or heart disease.     Every 10 years, have a colonoscopy. Or, have a yearly FIT test (stool test). These exams will check for colon cancer.    Talk to with your health care provider about screening for Abdominal Aortic Aneurysm if you have a family history of AAA or have a history of smoking.  Shots:     Get a flu shot each year.     Get a tetanus shot every 10 years.     Talk to your doctor about your pneumonia vaccines. There are now two you should receive - Pneumovax (PPSV 23) and Prevnar (PCV 13).    Talk to your pharmacist about a shingles vaccine.     Talk to your doctor about the hepatitis B vaccine.  Nutrition:     Eat at least 5 servings of fruits and vegetables each day.     Eat whole-grain bread, whole-wheat pasta and brown rice instead of white grains and rice.     Get adequate Calcium and Vitamin D.   Lifestyle    Exercise for at least 150 minutes a week (30 minutes a day, 5 days a week). This will help you control your weight and prevent disease.     Limit alcohol to one drink per day.     No smoking.     Wear sunscreen to prevent skin cancer.     See your dentist every six months for an exam and cleaning.     See your eye doctor every 1 to 2 years to screen for conditions such as glaucoma, macular degeneration and cataracts.    FOOT PAIN  For metatarsalgia you may benefit from shoe insert.  Recommend shoe insert to offload pressure from the distal metatarsal bones.    For congestion recommend taking Sudafed (pseudoephedrine) decongestant.

## 2018-09-04 ENCOUNTER — TRANSFERRED RECORDS (OUTPATIENT)
Dept: FAMILY MEDICINE | Facility: CLINIC | Age: 73
End: 2018-09-04

## 2019-02-19 ENCOUNTER — OFFICE VISIT (OUTPATIENT)
Dept: OPTOMETRY | Facility: CLINIC | Age: 74
End: 2019-02-19
Payer: COMMERCIAL

## 2019-02-19 DIAGNOSIS — H25.13 NUCLEAR AGE-RELATED CATARACT, BOTH EYES: ICD-10-CM

## 2019-02-19 DIAGNOSIS — H52.4 PRESBYOPIA: ICD-10-CM

## 2019-02-19 DIAGNOSIS — H43.812 PVD (POSTERIOR VITREOUS DETACHMENT), LEFT: ICD-10-CM

## 2019-02-19 DIAGNOSIS — Z98.890 S/P LASIK SURGERY: ICD-10-CM

## 2019-02-19 DIAGNOSIS — H52.221 REGULAR ASTIGMATISM OF RIGHT EYE: ICD-10-CM

## 2019-02-19 DIAGNOSIS — H52.11 MYOPIA, RIGHT: ICD-10-CM

## 2019-02-19 DIAGNOSIS — Z01.01 ENCOUNTER FOR EXAMINATION OF EYES AND VISION WITH ABNORMAL FINDINGS: Primary | ICD-10-CM

## 2019-02-19 PROCEDURE — 92015 DETERMINE REFRACTIVE STATE: CPT | Performed by: OPTOMETRIST

## 2019-02-19 PROCEDURE — 92014 COMPRE OPH EXAM EST PT 1/>: CPT | Performed by: OPTOMETRIST

## 2019-02-19 ASSESSMENT — VISUAL ACUITY
CORRECTION_TYPE: GLASSES
OS_CC: 20/40
OD_SC: 20/40
OS_SC: 20/25
OD_SC: 20/250
METHOD: SNELLEN - LINEAR
OS_CC: 20/60
OD_CC: 20/20
OD_CC: 20/30

## 2019-02-19 ASSESSMENT — REFRACTION_WEARINGRX
OS_CYLINDER: +0.25
OD_ADD: +2.25
OS_AXIS: 116
OS_SPHERE: -0.25
OD_CYLINDER: +1.25
OD_CYLINDER: +1.25
SPECS_TYPE: SVL
OS_SPHERE: +2.00
OD_AXIS: 007
OD_SPHERE: -2.00
OS_CYLINDER: SPHERE
OD_AXIS: 017
OD_SPHERE: PLANO
OS_ADD: +2.25
SPECS_TYPE: PAL

## 2019-02-19 ASSESSMENT — TONOMETRY
OS_IOP_MMHG: 16
IOP_METHOD: APPLANATION
OD_IOP_MMHG: 19

## 2019-02-19 ASSESSMENT — REFRACTION_MANIFEST
OS_SPHERE: PLANO
OD_CYLINDER: +1.25
OD_AXIS: 016
OS_ADD: +2.50
OD_ADD: +2.50
OD_SPHERE: -2.25
OS_CYLINDER: SPHERE

## 2019-02-19 ASSESSMENT — SLIT LAMP EXAM - LIDS
COMMENTS: NORMAL
COMMENTS: NORMAL

## 2019-02-19 ASSESSMENT — CONF VISUAL FIELD
OS_NORMAL: 1
OD_NORMAL: 1

## 2019-02-19 ASSESSMENT — EXTERNAL EXAM - LEFT EYE: OS_EXAM: NORMAL

## 2019-02-19 ASSESSMENT — CUP TO DISC RATIO
OS_RATIO: 0.25
OD_RATIO: 0.25

## 2019-02-19 ASSESSMENT — EXTERNAL EXAM - RIGHT EYE: OD_EXAM: NORMAL

## 2019-02-19 NOTE — LETTER
2/19/2019         RE: Daniel Samayoa  [address]  [facility] [address]        Dear Colleague,    Thank you for referring your patient, Daniel Samayoa, to the [facility]. Please see a copy of my visit note below.    Chief Complaint   Patient presents with     Annual Eye Exam      Accompanied by self  Last Eye Exam: 1 year ago  Dilated Previously: Yes    What are you currently using to see?  Glasses-1 year old       Distance Vision Acuity: Satisfied with vision, tested patient with rx 1 glasses.    Near Vision Acuity: Satisfied with vision while reading  with glasses    Eye Comfort: good  Do you use eye drops? : No  Occupation or Hobbies: retired    Sofiya Carballo, Optometric Tech          Medical, surgical and family histories reviewed and updated 2/19/2019.       OBJECTIVE: See Ophthalmology exam    ASSESSMENT:    ICD-10-CM    1. Encounter for examination of eyes and vision with abnormal findings Z01.01    2. Nuclear age-related cataract, both eyes H25.13    3. PVD (posterior vitreous detachment), left H43.812    4. S/P LASIK surgery - Left Eye Z98.890    5. Myopia, right H52.11    6. Regular astigmatism of right eye H52.221    7. Presbyopia H52.4       PLAN:     Patient Instructions   You have the start of mild cataracts.  You may notice some blurred vision or glare with night driving.  It is important that you wear good sunglasses to protect your eyes from the ultraviolet light from the sun. I recommend that you return in 1 year for an eye exam unless there are any sudden changes in your vision.       You have a PVD- posterior vitreous detachment which is due to the gel of the eye shrinking and clumping together.  This can sometimes cause holes or tears in the retina.  The signs of a retinal detachment are flashes of light or a curtain coming over the vision. If you notice any of these changes please let me know right away.  If I am not available this is an emergency type situation  that you would need to be seen.    Daniel was advised of today's exam findings.  Optional to fill new glasses prescription, minimal change  Copy of glasses Rx provided today.  Return in 1 year for eye exam, or sooner if needed.    The affects of the dilating drops last for 4- 6 hours.  You will be more sensitive to light and vision will be blurry up close.  Mydriatic sunglasses were given if needed.      Min Kirkland O.D.  [address] [facility] [place]  [address]    [phone]           Again, thank you for allowing me to participate in the care of your patient.        Sincerely,        Min Kirkland, OD

## 2019-02-19 NOTE — PROGRESS NOTES
Chief Complaint   Patient presents with     Annual Eye Exam      Accompanied by self  Last Eye Exam: 1 year ago  Dilated Previously: Yes    What are you currently using to see?  Glasses-1 year old       Distance Vision Acuity: Satisfied with vision, tested patient with rx 1 glasses.    Near Vision Acuity: Satisfied with vision while reading  with glasses    Eye Comfort: good  Do you use eye drops? : No  Occupation or Hobbies: retired    Sofiya Carballo, [employer]          Medical, surgical and family histories reviewed and updated 2/19/2019.       OBJECTIVE: See Ophthalmology exam    ASSESSMENT:    ICD-10-CM    1. Encounter for examination of eyes and vision with abnormal findings Z01.01    2. Nuclear age-related cataract, both eyes H25.13    3. PVD (posterior vitreous detachment), left H43.812    4. S/P LASIK surgery - Left Eye Z98.890    5. Myopia, right H52.11    6. Regular astigmatism of right eye H52.221    7. Presbyopia H52.4       PLAN:     Patient Instructions   You have the start of mild cataracts.  You may notice some blurred vision or glare with night driving.  It is important that you wear good sunglasses to protect your eyes from the ultraviolet light from the sun. I recommend that you return in 1 year for an eye exam unless there are any sudden changes in your vision.       You have a PVD- posterior vitreous detachment which is due to the gel of the eye shrinking and clumping together.  This can sometimes cause holes or tears in the retina.  The signs of a retinal detachment are flashes of light or a curtain coming over the vision. If you notice any of these changes please let me know right away.  If I am not available this is an emergency type situation that you would need to be seen.    Daniel was advised of today's exam findings.  Optional to fill new glasses prescription, minimal change  Copy of glasses Rx provided today.  Return in 1 year for eye exam, or sooner if needed.    The affects of  the dilating drops last for 4- 6 hours.  You will be more sensitive to light and vision will be blurry up close.  Mydriatic sunglasses were given if needed.      Min Kirkland O.D.  [address]  [place]  [address]    [phone]

## 2019-02-19 NOTE — PATIENT INSTRUCTIONS
You have the start of mild cataracts.  You may notice some blurred vision or glare with night driving.  It is important that you wear good sunglasses to protect your eyes from the ultraviolet light from the sun. I recommend that you return in 1 year for an eye exam unless there are any sudden changes in your vision.       You have a PVD- posterior vitreous detachment which is due to the gel of the eye shrinking and clumping together.  This can sometimes cause holes or tears in the retina.  The signs of a retinal detachment are flashes of light or a curtain coming over the vision. If you notice any of these changes please let me know right away.  If I am not available this is an emergency type situation that you would need to be seen.    Daniel was advised of today's exam findings.  Optional to fill new glasses prescription, minimal change  Copy of glasses Rx provided today.  Return in 1 year for eye exam, or sooner if needed.    The affects of the dilating drops last for 4- 6 hours.  You will be more sensitive to light and vision will be blurry up close.  Mydriatic sunglasses were given if needed.      Min Kirkland O.D.  [address]  [place]  [address]    [phone]

## 2019-02-27 DIAGNOSIS — E78.5 DYSLIPIDEMIA: ICD-10-CM

## 2019-03-01 RX ORDER — ROSUVASTATIN CALCIUM 5 MG/1
TABLET, COATED ORAL
Qty: 90 TABLET | Refills: 3 | Status: SHIPPED | OUTPATIENT
Start: 2019-03-01 | End: 2019-08-29

## 2019-03-06 ENCOUNTER — TRANSFERRED RECORDS (OUTPATIENT)
Dept: FAMILY MEDICINE | Facility: CLINIC | Age: 74
End: 2019-03-06

## 2019-06-11 ENCOUNTER — TRANSFERRED RECORDS (OUTPATIENT)
Dept: FAMILY MEDICINE | Facility: CLINIC | Age: 74
End: 2019-06-11

## 2019-06-19 ENCOUNTER — OFFICE VISIT (OUTPATIENT)
Dept: FAMILY MEDICINE | Facility: CLINIC | Age: 74
End: 2019-06-19

## 2019-06-19 VITALS
TEMPERATURE: 98.6 F | WEIGHT: 198 LBS | RESPIRATION RATE: 16 BRPM | BODY MASS INDEX: 29.33 KG/M2 | HEART RATE: 65 BPM | OXYGEN SATURATION: 96 % | DIASTOLIC BLOOD PRESSURE: 89 MMHG | HEIGHT: 69 IN | SYSTOLIC BLOOD PRESSURE: 110 MMHG

## 2019-06-19 DIAGNOSIS — Z01.818 PREOP GENERAL PHYSICAL EXAM: Primary | ICD-10-CM

## 2019-06-19 DIAGNOSIS — N52.8 OTHER MALE ERECTILE DYSFUNCTION: ICD-10-CM

## 2019-06-19 DIAGNOSIS — E78.5 DYSLIPIDEMIA: ICD-10-CM

## 2019-06-19 DIAGNOSIS — M25.511 CHRONIC RIGHT SHOULDER PAIN: ICD-10-CM

## 2019-06-19 DIAGNOSIS — G89.29 CHRONIC RIGHT SHOULDER PAIN: ICD-10-CM

## 2019-06-19 PROCEDURE — 99214 OFFICE O/P EST MOD 30 MIN: CPT | Performed by: FAMILY MEDICINE

## 2019-06-19 ASSESSMENT — MIFFLIN-ST. JEOR: SCORE: 1643.5

## 2019-06-19 NOTE — PROGRESS NOTES
[facility]  [address] [place] [id number] [phone]  Dept: [phone]    PRE-OP EVALUATION:  Today's date: 2019    Daniel Samayoa (: 1945) presents for pre-operative evaluation assessment as requested by Dr. Jey Nazario.  He requires evaluation and anesthesia risk assessment prior to undergoing surgery/procedure for treatment of right shoulder    Proposed Surgery/ Procedure: Right Rotator Cuff  Date of Surgery/ Procedure: 2019  Time of Surgery/ Procedure: 12pm  Hospital/Surgical Facility: [address]  Fax number for surgical facility: [phone]  Primary Physician: Davin Kimble  Type of Anesthesia Anticipated: General    Patient has a Health Care Directive or Living Will:  YES     1. NO - Do you have a history of heart attack, stroke, stent, bypass or surgery on an artery in the head, neck, heart or legs?  2. NO - Do you ever have any pain or discomfort in your chest?  3. NO - Do you have a history of  Heart Failure?  4. NO - Are you troubled by shortness of breath when: walking on the level, up a slight hill or at night?  5. NO - Do you currently have a cold, bronchitis or other respiratory infection?  6. NO - Do you have a cough, shortness of breath or wheezing?  7. NO - Do you sometimes get pains in the calves of your legs when you walk?  8. NO - Do you or anyone in your family have previous history of blood clots?  9. NO - Do you or does anyone in your family have a serious bleeding problem such as prolonged bleeding following surgeries or cuts?  10. NO - Have you ever had problems with anemia or been told to take iron pills?  11. NO - Have you had any abnormal blood loss such as black, tarry or bloody stools, or abnormal vaginal bleeding?  12. NO - Have you ever had a blood transfusion?  13. NO - Have you or any of your relatives ever had problems with anesthesia?  14. NO - Do you have sleep apnea, excessive snoring or daytime  drowsiness?  15. NO - Do you have any prosthetic heart valves?  16. NO - Do you have prosthetic joints?  17. NO - Is there any chance that you may be pregnant?    HPI:     HPI related to upcoming procedure:       See problem list for active medical problems.  Problems all longstanding and stable, except as noted/documented.  See ROS for pertinent symptoms related to these conditions.      MEDICAL HISTORY:     Patient Active Problem List    Diagnosis Date Noted     Cataracts, both eyes 12/17/2014     Priority: Medium     Very Mild       PVD (posterior vitreous detachment), both eyes 12/17/2014     Priority: Medium     Advance Care Planning 05/30/2014     Priority: Medium     Advance Care Planning 7/14/2015: Receipt of ACP document:  Received: Health Care Directive which was witnessed or notarized on 6/24/15.  Document previously scanned on 6/26/15.  Validation form completed and sent to be scanned.  Code Status needs to be updated to reflect choices in most recent ACP document.  Confirmed/documented designated decision maker(s).  Added by [facility] 08/29/2013     Priority: Medium     .State Tier Level:  Tier 1   8/20/14               Prostate cancer (H)      Priority: Medium     Dyslipidemia      Priority: Medium      Past Medical History:   Diagnosis Date     Cataract 5/10/2012     Dyslipidemia      Hyperlipidaemia LDL goal < 100      Prostate cancer (H)      Past Surgical History:   Procedure Laterality Date     APPENDECTOMY  1980     LASER SURGERY OF EYE      correction left lense     LASIK  2 -3 years ago     left eye only     LEG SURGERY  2004    ORIF     PROSTATECTOMY RETROPUBIC RADICAL  2002    Radical at [place]     Current Outpatient Medications   Medication Sig Dispense Refill     Ascorbic Acid (C 1000 PO) Take by mouth every other day        aspirin 81 MG EC tablet Take 81 mg by mouth daily.       azithromycin (ZITHROMAX) 250 MG tablet Two tablets first day, then one  "tablet daily for four days. 6 tablet 3     B Complex Vitamins (B COMPLEX 50 PO) Take  by mouth.       calcium carbonate (TUMS) 500 MG chewable tablet Take 2 chew tab by mouth daily       Calcium-Vitamin D (CALCIUM 500 +D) 500-125 MG-UNIT TABS Take  by mouth daily.       fish oil-omega-3 fatty acids (FISH OIL) 1000 MG capsule Take 2 g by mouth daily.       Ibuprofen (ADVIL) 200 MG capsule Take 200 mg by mouth every 4 hours as needed.       omeprazole (PRILOSEC) 20 MG CR capsule Take 1 capsule (20 mg) by mouth daily as needed 30 capsule 11     rosuvastatin (CRESTOR) 5 MG tablet TAKE 1 TABLET (5 MG) BY MOUTH AT BEDTIME 90 tablet 3     sildenafil (REVATIO) 20 MG tablet TAKE UP TO THREE TABLETS AS NEEDED FOR SEXUAL ACTIVITY.  Never use with nitroglycerin, terazosin or doxazosin. 90 tablet 1     OTC products: None, except as noted above    Allergies   Allergen Reactions     Simvastatin Other (See Comments)     Per paper chart medical records -- patient tried zocor in 7/2004 and had myalgias so discontinued.  3/2014 tried again -- myalgias again.      Latex Allergy: NO    Social History     Tobacco Use     Smoking status: Never Smoker     Smokeless tobacco: Never Used   Substance Use Topics     Alcohol use: Yes     Alcohol/week: 1.0 oz     Types: 2 Cans of beer per week     Comment: little     History   Drug Use No       REVIEW OF SYSTEMS:   Constitutional, HEENT, cardiovascular, pulmonary, gi and gu systems are negative, except as otherwise noted.    EXAM:   /89   Pulse 65   Temp 98.6  F (37  C) (Temporal)   Resp 16   Ht 1.753 m (5' 9\")   Wt 89.8 kg (198 lb)   SpO2 96%   BMI 29.24 kg/m    GENERAL APPEARANCE: healthy, alert and no distress  HENT: ear canals and TM's normal and nose and mouth without ulcers or lesions  RESP: lungs clear to auscultation - no rales, rhonchi or wheezes  CV: regular rate and rhythm, normal S1 S2, no S3 or S4 and no murmur, click or rub   ABDOMEN: soft, nontender, no HSM or masses " and bowel sounds normal  NEURO: Normal strength and tone, sensory exam grossly normal, mentation intact and speech normal    DIAGNOSTICS:     Recent Labs   Lab Test 08/20/18  1242 05/03/18  1449 05/03/18  1347 08/18/17  0843 08/18/17   HGB  --   --  15.8  --  13.7   PLT  --   --  169  --  159     --   --  142  --    POTASSIUM 4.6  --   --  4.0  --    CR 0.98  --   --  0.80  --    A1C  --  5.9*  --  5.8*  --       IMPRESSION:   Reason for surgery/procedure:  R RC cuff repair  Diagnosis/reason for consult: preoperative clearance    The proposed surgical procedure is considered LOW risk.    REVISED CARDIAC RISK INDEX  The patient has the following serious cardiovascular risks for perioperative complications such as (MI, PE, VFib and 3  AV Block):  No serious cardiac risks  INTERPRETATION: 0 risks: Class I (very low risk - 0.4% complication rate)    The patient has the following additional risks for perioperative complications:  No identified additional risks      ICD-10-CM    1. Preop general physical exam Z01.818    2. Dyslipidemia E78.5    3. Chronic right shoulder pain M25.511     G89.29      RECOMMENDATIONS:     --Patient is to take all scheduled medications on the day of surgery EXCEPT for modifications listed below.    APPROVAL GIVEN to proceed with proposed procedure, without further diagnostic evaluation     Signed Electronically by: Davin Kimble MD    Copy of this evaluation report is provided to requesting physician.    [facility] Preop Guidelines    Revised Cardiac Risk Index

## 2019-07-02 ENCOUNTER — TRANSFERRED RECORDS (OUTPATIENT)
Dept: FAMILY MEDICINE | Facility: CLINIC | Age: 74
End: 2019-07-02

## 2019-07-19 ENCOUNTER — TRANSFERRED RECORDS (OUTPATIENT)
Dept: FAMILY MEDICINE | Facility: CLINIC | Age: 74
End: 2019-07-19

## 2019-08-16 ENCOUNTER — TRANSFERRED RECORDS (OUTPATIENT)
Dept: FAMILY MEDICINE | Facility: CLINIC | Age: 74
End: 2019-08-16

## 2019-08-22 DIAGNOSIS — R73.09 IMPAIRED GLUCOSE TOLERANCE TEST: ICD-10-CM

## 2019-08-22 DIAGNOSIS — E78.5 DYSLIPIDEMIA: ICD-10-CM

## 2019-08-22 DIAGNOSIS — C61 PROSTATE CANCER (H): ICD-10-CM

## 2019-08-22 DIAGNOSIS — G62.9 PERIPHERAL NERVE DISORDER: Primary | ICD-10-CM

## 2019-08-22 DIAGNOSIS — Z13.6 SCREENING FOR CARDIOVASCULAR CONDITION: ICD-10-CM

## 2019-08-22 LAB
% GRANULOCYTES: 56.2 % (ref 42.2–75.2)
HCT VFR BLD AUTO: 41.1 % (ref 39–51)
HEMOGLOBIN: 14.1 G/DL (ref 13.4–17.5)
LYMPHOCYTES NFR BLD AUTO: 34.5 % (ref 20.5–51.1)
MCH RBC QN AUTO: 31.7 PG (ref 27–31)
MCHC RBC AUTO-ENTMCNC: 34.3 G/DL (ref 33–37)
MCV RBC AUTO: 92.2 FL (ref 80–100)
MONOCYTES NFR BLD AUTO: 9.3 % (ref 1.7–9.3)
PLATELET # BLD AUTO: 170 K/UL (ref 140–450)
RBC # BLD AUTO: 4.45 X10/CMM (ref 4.2–5.9)
WBC # BLD AUTO: 5.3 X10/CMM (ref 3.8–11)

## 2019-08-22 PROCEDURE — 36415 COLL VENOUS BLD VENIPUNCTURE: CPT | Performed by: FAMILY MEDICINE

## 2019-08-22 PROCEDURE — 85025 COMPLETE CBC W/AUTO DIFF WBC: CPT | Performed by: FAMILY MEDICINE

## 2019-08-23 LAB
ALBUMIN SERPL-MCNC: 4.2 G/DL (ref 3.5–4.8)
ALBUMIN/GLOB SERPL: 2.1 {RATIO} (ref 1.2–2.2)
ALP SERPL-CCNC: 82 IU/L (ref 39–117)
ALT SERPL-CCNC: 22 IU/L (ref 0–44)
AST SERPL-CCNC: 20 IU/L (ref 0–40)
BILIRUB SERPL-MCNC: 0.6 MG/DL (ref 0–1.2)
BUN SERPL-MCNC: 18 MG/DL (ref 8–27)
BUN/CREATININE RATIO: 22 (ref 10–24)
CALCIUM SERPL-MCNC: 9.2 MG/DL (ref 8.6–10.2)
CHLORIDE SERPLBLD-SCNC: 104 MMOL/L (ref 96–106)
CHOLEST SERPL-MCNC: 162 MG/DL (ref 100–199)
CREAT SERPL-MCNC: 0.83 MG/DL (ref 0.76–1.27)
EGFR IF AFRICN AM: 102 ML/MIN/1.73
EGFR IF NONAFRICN AM: 89 ML/MIN/1.73
GLOBULIN, TOTAL: 2 G/DL (ref 1.5–4.5)
GLUCOSE SERPL-MCNC: 110 MG/DL (ref 65–99)
HDLC SERPL-MCNC: 48 MG/DL
LDL/HDL RATIO: 1.9 RATIO (ref 0–3.6)
LDLC SERPL CALC-MCNC: 93 MG/DL (ref 0–99)
POTASSIUM SERPL-SCNC: 4.3 MMOL/L (ref 3.5–5.2)
PROT SERPL-MCNC: 6.2 G/DL (ref 6–8.5)
PSA NG/ML: <0.1 NG/ML (ref 0–4)
SODIUM SERPL-SCNC: 142 MMOL/L (ref 134–144)
TOTAL CO2: 26 MMOL/L (ref 20–29)
TRIGL SERPL-MCNC: 103 MG/DL (ref 0–149)
VLDLC SERPL CALC-MCNC: 21 MG/DL (ref 5–40)

## 2019-08-27 LAB — HBA1C MFR BLD: 5.9 % (ref 4.8–5.6)

## 2019-08-29 ENCOUNTER — OFFICE VISIT (OUTPATIENT)
Dept: FAMILY MEDICINE | Facility: CLINIC | Age: 74
End: 2019-08-29

## 2019-08-29 VITALS
HEART RATE: 54 BPM | BODY MASS INDEX: 31.01 KG/M2 | OXYGEN SATURATION: 95 % | SYSTOLIC BLOOD PRESSURE: 110 MMHG | DIASTOLIC BLOOD PRESSURE: 82 MMHG | RESPIRATION RATE: 16 BRPM | WEIGHT: 210 LBS

## 2019-08-29 DIAGNOSIS — N52.9 ERECTILE DYSFUNCTION, UNSPECIFIED ERECTILE DYSFUNCTION TYPE: ICD-10-CM

## 2019-08-29 DIAGNOSIS — E78.5 DYSLIPIDEMIA: ICD-10-CM

## 2019-08-29 DIAGNOSIS — K21.9 GASTROESOPHAGEAL REFLUX DISEASE WITHOUT ESOPHAGITIS: ICD-10-CM

## 2019-08-29 DIAGNOSIS — D36.9 ADENOMATOUS POLYP: ICD-10-CM

## 2019-08-29 DIAGNOSIS — Z00.00 ENCOUNTER FOR MEDICARE ANNUAL WELLNESS EXAM: Primary | ICD-10-CM

## 2019-08-29 DIAGNOSIS — Z85.46 HISTORY OF PROSTATE CANCER: ICD-10-CM

## 2019-08-29 PROCEDURE — 99213 OFFICE O/P EST LOW 20 MIN: CPT | Mod: 25 | Performed by: FAMILY MEDICINE

## 2019-08-29 PROCEDURE — 99397 PER PM REEVAL EST PAT 65+ YR: CPT | Performed by: FAMILY MEDICINE

## 2019-08-29 RX ORDER — ROSUVASTATIN CALCIUM 5 MG/1
TABLET, COATED ORAL
Qty: 90 TABLET | Refills: 3 | Status: SHIPPED | OUTPATIENT
Start: 2019-08-29 | End: 2020-07-21

## 2019-08-29 RX ORDER — SILDENAFIL CITRATE 20 MG/1
TABLET ORAL
Qty: 90 TABLET | Refills: 3 | Status: SHIPPED | OUTPATIENT
Start: 2019-08-29

## 2019-08-29 NOTE — ADDENDUM NOTE
Addended by: CHARAN VELASQUEZ on: 8/29/2019 10:37 AM     Modules accepted: Level of Service, SmartSet

## 2019-08-29 NOTE — PATIENT INSTRUCTIONS
Patient Education   Personalized Prevention Plan  You are due for the preventive services outlined below.  Your care team is available to assist you in scheduling these services.  If you have already completed any of these items, please share that information with your care team to update in your medical record.  Health Maintenance Due   Topic Date Due     Zoster (Shingles) Vaccine (2 of 3) 07/25/2013     PHQ-2  01/01/2019     FALL RISK ASSESSMENT  08/28/2019     Annual Wellness Visit  08/28/2019        Patient Education   Personalized Prevention Plan  You are due for the preventive services outlined below.  Your care team is available to assist you in scheduling these services.  If you have already completed any of these items, please share that information with your care team to update in your medical record.  Health Maintenance Due   Topic Date Due     Zoster (Shingles) Vaccine (2 of 3) 07/25/2013     PHQ-2  01/01/2019     FALL RISK ASSESSMENT  08/28/2019     Annual Wellness Visit  08/28/2019

## 2019-08-29 NOTE — PROGRESS NOTES
"  SUBJECTIVE:   Daniel Samayoa is a 73 year old male who presents for Preventive Visit.    Are you in the first 12 months of your Medicare Part B coverage?  No    Physical Health:    In general, how would you rate your overall physical health? good    Outside of work, how many days during the week do you exercise? 4-5 days/week    Outside of work, approximately how many minutes a day do you exercise?45-60 minutes    If you drink alcohol do you typically have >3 drinks per day or >7 drinks per week? No    Do you usually eat at least 4 servings of fruit and vegetables a day, include whole grains & fiber and avoid regularly eating high fat or \"junk\" foods? Yes    Do you have any problems taking medications regularly?  No    Do you have any side effects from medications? not applicable    Needs assistance for the following daily activities: no assistance needed    Which of the following safety concerns are present in your home?  none identified     Hearing impairment: No    In the past 6 months, have you been bothered by leaking of urine? no    Mental Health:    In general, how would you rate your overall mental or emotional health? good  PHQ-2 Score:      Do you feel safe in your environment? Yes    Do you have a Health Care Directive? No: Advance care planning reviewed with patient; information given to patient to review.    Additional concerns to address?      Fall risk:None       Cognitive Screenin) Repeat 3 items (Leader, Season, Table)    2) Clock draw: NORMAL  3) 3 item recall: Recalls 3 objects  Results: 3 items recalled: COGNITIVE IMPAIRMENT LESS LIKELY    Mini-CogTM Copyright ALEKSANDR Sawant. Licensed by the author for use in [facility]; reprinted with permission (rafa@.[facility]). All rights reserved.      Do you have sleep apnea, excessive snoring or daytime drowsiness?: no    In addition he has stable issues including elevated cholesterol (crestor), gerd (PPI), ED (viagra)   He is due for refills " "and has had labs done in the past few days to review    Reviewed and updated as needed this visit by clinical staff         Reviewed and updated as needed this visit by Provider        Social History     Tobacco Use     Smoking status: Never Smoker     Smokeless tobacco: Never Used   Substance Use Topics     Alcohol use: Yes     Alcohol/week: 1.0 oz     Types: 2 Cans of beer per week     Comment: little     Current providers sharing in care for this patient include:   Patient Care Team:  Davin Kimble MD as PCP - General (Family Practice)  Davin Kimble MD as Assigned PCP    The following health maintenance items are reviewed in Epic and correct as of today:  Health Maintenance   Topic Date Due     ZOSTER IMMUNIZATION (2 of 3) 07/25/2013     PHQ-2  01/01/2019     FALL RISK ASSESSMENT  08/28/2019     MEDICARE ANNUAL WELLNESS VISIT  08/28/2019     INFLUENZA VACCINE (1) 09/01/2019     EYE EXAM  02/19/2020     ADVANCE CARE PLANNING  06/24/2020     COLONOSCOPY  09/21/2020     LIPID  08/22/2024     DTAP/TDAP/TD IMMUNIZATION (3 - Td) 08/25/2027     HEPATITIS C SCREENING  Completed     PNEUMOCOCCAL IMMUNIZATION 65+ LOW/MEDIUM RISK  Completed     AORTIC ANEURYSM SCREENING (SYSTEM ASSIGNED)  Completed     IPV IMMUNIZATION  Aged Out     MENINGITIS IMMUNIZATION  Aged Out     Lab work is in process  Pneumonia Vaccine:Adults age 65+ who received Pneumovax (PPSV23) at 65 years or older: Should be given PCV13 > 1 year after their most recent PPSV23    ROS:  Constitutional, HEENT, cardiovascular, pulmonary, gi and gu systems are negative, except as otherwise noted.    OBJECTIVE:   /82   Pulse 54   Resp 16   Wt 95.3 kg (210 lb)   SpO2 95%   BMI 31.01 kg/m   Estimated body mass index is 29.24 kg/m  as calculated from the following:    Height as of 6/19/19: 1.753 m (5' 9\").    Weight as of 6/19/19: 89.8 kg (198 lb).  EXAM:   GENERAL: healthy, alert and no distress  EYES: Eyes grossly normal to inspection, PERRL and " "conjunctivae and sclerae normal  HENT: ear canals and TM's normal, nose and mouth without ulcers or lesions  NECK: no adenopathy, no asymmetry, masses, or scars and thyroid normal to palpation  RESP: lungs clear to auscultation - no rales, rhonchi or wheezes  CV: regular rate and rhythm, normal S1 S2, no S3 or S4, no murmur, click or rub, no peripheral edema and peripheral pulses strong  ABDOMEN: soft, nontender, no hepatosplenomegaly, no masses and bowel sounds normal  MS: no gross musculoskeletal defects noted, no edema  SKIN: no suspicious lesions or rashes  NEURO: Normal strength and tone, mentation intact and speech normal  PSYCH: mentation appears normal, affect normal/bright    Diagnostic Test Results:  Labs reviewed in Epic    ASSESSMENT / PLAN:       ICD-10-CM    1. Encounter for Medicare annual wellness exam Z00.00    2. Dyslipidemia E78.5 rosuvastatin (CRESTOR) 5 MG tablet   3. Gastroesophageal reflux disease without esophagitis K21.9 omeprazole (PRILOSEC) 20 MG DR capsule   4. Erectile dysfunction, unspecified erectile dysfunction type N52.9 sildenafil (REVATIO) 20 MG tablet   5. Adenomatous polyp D36.9 GASTROENTEROLOGY ADULT REF PROCEDURE ONLY   6. History of prostate cancer Z85.46        End of Life Planning:  Patient currently has an advanced directive: No.  I have verified the patient's ablity to prepare an advanced directive/make health care decisions.  Literature was provided to assist patient in preparing an advanced directive.    COUNSELING:  Reviewed preventive health counseling, as reflected in patient instructions  Special attention given to:    Estimated body mass index is 29.24 kg/m  as calculated from the following:    Height as of 6/19/19: 1.753 m (5' 9\").    Weight as of 6/19/19: 89.8 kg (198 lb).    Weight management plan: Discussed healthy diet and exercise guidelines     reports that he has never smoked. He has never used smokeless tobacco.    Appropriate preventive services were " discussed with this patient, including applicable screening as appropriate for cardiovascular disease, diabetes, osteopenia/osteoporosis, and glaucoma.  As appropriate for age/gender, discussed screening for colorectal cancer, prostate cancer, breast cancer, and cervical cancer. Checklist reviewing preventive services available has been given to the patient.    Reviewed patients plan of care and provided an AVS. The Basic Care Plan (routine screening as documented in Health Maintenance) for Daniel meets the Care Plan requirement. This Care Plan has been established and reviewed with the Patient.      Refilled meds and discussed his chronic medical conditions as well as his recent labs as they relate to his conditions and medications      Counseling Resources:  ATP IV Guidelines  Pooled Cohorts Equation Calculator  Breast Cancer Risk Calculator  FRAX Risk Assessment  ICSI Preventive Guidelines  Dietary Guidelines for Americans, 2010  USDA's MyPlate  ASA Prophylaxis  Lung CA Screening    Davin Kimble MD  [facility]

## 2019-09-10 ENCOUNTER — TRANSFERRED RECORDS (OUTPATIENT)
Dept: FAMILY MEDICINE | Facility: CLINIC | Age: 74
End: 2019-09-10

## 2019-09-24 ENCOUNTER — TRANSFERRED RECORDS (OUTPATIENT)
Dept: FAMILY MEDICINE | Facility: CLINIC | Age: 74
End: 2019-09-24

## 2019-10-21 ENCOUNTER — OFFICE VISIT (OUTPATIENT)
Dept: FAMILY MEDICINE | Facility: CLINIC | Age: 74
End: 2019-10-21

## 2019-10-21 VITALS
OXYGEN SATURATION: 97 % | DIASTOLIC BLOOD PRESSURE: 70 MMHG | RESPIRATION RATE: 16 BRPM | HEART RATE: 60 BPM | SYSTOLIC BLOOD PRESSURE: 110 MMHG | BODY MASS INDEX: 31.25 KG/M2 | WEIGHT: 211.6 LBS

## 2019-10-21 DIAGNOSIS — H60.332 ACUTE SWIMMER'S EAR OF LEFT SIDE: Primary | ICD-10-CM

## 2019-10-21 PROCEDURE — 99213 OFFICE O/P EST LOW 20 MIN: CPT | Performed by: FAMILY MEDICINE

## 2019-10-21 RX ORDER — NEOMYCIN SULFATE, POLYMYXIN B SULFATE AND HYDROCORTISONE 10; 3.5; 1 MG/ML; MG/ML; [USP'U]/ML
3 SUSPENSION/ DROPS AURICULAR (OTIC) 4 TIMES DAILY
Qty: 10 ML | Refills: 0 | Status: SHIPPED | OUTPATIENT
Start: 2019-10-21 | End: 2020-04-01

## 2019-10-21 NOTE — PROGRESS NOTES
Problem(s) Oriented visit        SUBJECTIVE:                                                    Daniel Samayoa is a 74 year old male who presents to clinic today for the following health issues :  Daniel has pain swelling and itching in his left ear canal, he is a swimmer and thinks this could be swimmer's ear, which he has had before. He reports it started about a weeks ago and is getting worse, pinna is tender to touch.     Problem list, Medication list, Allergies, and Medical/Social/Surgical histories reviewed in [facility] and updated as appropriate.   Additional history: as documented    ROS:  General:  NEGATIVE for fever, chills, change in weight HEENT:  No changes in hearing or vision, no nose bleeds or other nasal problems and Ears Positive for ear pain     Histories:   Patient Active Problem List   Diagnosis     Dyslipidemia     Health Care Home     Advance Care Planning     Cataracts, both eyes     PVD (posterior vitreous detachment), both eyes     Other male erectile dysfunction     History of prostate cancer     Past Surgical History:   Procedure Laterality Date     APPENDECTOMY  1980     LASER SURGERY OF EYE      correction left lense     LASIK  2 -3 years ago     left eye only     LEG SURGERY  2004    ORIF     PROSTATECTOMY RETROPUBIC RADICAL  2002    Radical at [place]       Social History     Tobacco Use     Smoking status: Never Smoker     Smokeless tobacco: Never Used   Substance Use Topics     Alcohol use: Yes     Alcohol/week: 1.7 standard drinks     Types: 2 Cans of beer per week     Comment: little     Family History   Problem Relation Age of Onset     Hypertension Mother      Diabetes Mother      Lung Cancer Father      Pancreatic Cancer Brother      Rectal Cancer Sister      Glaucoma No family hx of      Macular Degeneration No family hx of            OBJECTIVE:                                                    /70   Pulse 60   Resp 16   Wt 96 kg (211 lb 9.6 oz)   SpO2 97%   BMI 31.25  kg/m    Body mass index is 31.25 kg/m .   GENERAL: healthy, alert and no distress  EYES: Eyes grossly normal to inspection, PERRL and conjunctivae and sclerae normal  HENT: normal cephalic/atraumatic, right ear: normal: no effusions, no erythema, normal landmarks, left ear: canal is swollen and red, unable to see TM, painful to exam, nose and mouth without ulcers or lesions, oropharynx clear and oral mucous membranes moist  NECK: no adenopathy, no asymmetry, masses, or scars and thyroid normal to palpation     ASSESSMENT/PLAN:                                                      Daneil was seen today for ear problem.    Diagnoses and all orders for this visit:    Acute swimmer's ear of left side  -     neomycin-polymyxin-hydrocortisone (CORTISPORIN) 3.5-43091-2 otic suspension; Place 3 drops into the right ear 4 times daily for 7 days  Use the wick I provided if you have any difficulty instilling the ear drops. Please follow up if your symptoms have not resolved by the time you have finished the ear drops.     ASSESSMENT/PLAN:       The following health maintenance items are reviewed in Epic and correct as of today:  Health Maintenance   Topic Date Due     ZOSTER IMMUNIZATION (2 of 3) 07/25/2013     PHQ-2  01/01/2019     FALL RISK ASSESSMENT  08/28/2019     INFLUENZA VACCINE (1) 09/01/2019     EYE EXAM  02/19/2020     ADVANCE CARE PLANNING  06/24/2020     MEDICARE ANNUAL WELLNESS VISIT  08/29/2020     COLONOSCOPY  09/21/2020     LIPID  08/22/2024     DTAP/TDAP/TD IMMUNIZATION (3 - Td) 08/25/2027     HEPATITIS C SCREENING  Completed     PNEUMOCOCCAL IMMUNIZATION 65+ LOW/MEDIUM RISK  Completed     AORTIC ANEURYSM SCREENING (SYSTEM ASSIGNED)  Completed     IPV IMMUNIZATION  Aged Out     MENINGITIS IMMUNIZATION  Aged Out       Cherri Whitmore NP  [facility]  Family Practice  [facility]  [phone]    For any issues my office # is [phone]

## 2019-10-21 NOTE — PATIENT INSTRUCTIONS
Use the wick I provided if you have any difficulty instilling the ear drops. Please follow up if your symptoms have not resolved by the time you have finished the ear drops.

## 2019-10-25 ENCOUNTER — TRANSFERRED RECORDS (OUTPATIENT)
Dept: FAMILY MEDICINE | Facility: CLINIC | Age: 74
End: 2019-10-25

## 2019-10-29 ENCOUNTER — OFFICE VISIT (OUTPATIENT)
Dept: FAMILY MEDICINE | Facility: CLINIC | Age: 74
End: 2019-10-29

## 2019-10-29 VITALS
RESPIRATION RATE: 16 BRPM | BODY MASS INDEX: 31.72 KG/M2 | OXYGEN SATURATION: 97 % | HEART RATE: 43 BPM | WEIGHT: 214.8 LBS | SYSTOLIC BLOOD PRESSURE: 124 MMHG | DIASTOLIC BLOOD PRESSURE: 82 MMHG

## 2019-10-29 DIAGNOSIS — R21 RASH: Primary | ICD-10-CM

## 2019-10-29 PROCEDURE — 99213 OFFICE O/P EST LOW 20 MIN: CPT | Performed by: FAMILY MEDICINE

## 2019-10-29 RX ORDER — TRIAMCINOLONE ACETONIDE 1 MG/G
CREAM TOPICAL 2 TIMES DAILY
Qty: 80 G | Refills: 1 | Status: SHIPPED | OUTPATIENT
Start: 2019-10-29 | End: 2021-11-17

## 2019-10-29 RX ORDER — AMOXICILLIN 500 MG/1
500 CAPSULE ORAL 3 TIMES DAILY
Qty: 21 CAPSULE | Refills: 1 | Status: SHIPPED | OUTPATIENT
Start: 2019-10-29 | End: 2019-11-12

## 2019-11-07 ENCOUNTER — HEALTH MAINTENANCE LETTER (OUTPATIENT)
Age: 74
End: 2019-11-07

## 2019-11-12 ENCOUNTER — OFFICE VISIT (OUTPATIENT)
Dept: FAMILY MEDICINE | Facility: CLINIC | Age: 74
End: 2019-11-12

## 2019-11-12 VITALS
SYSTOLIC BLOOD PRESSURE: 126 MMHG | OXYGEN SATURATION: 96 % | BODY MASS INDEX: 31.01 KG/M2 | RESPIRATION RATE: 16 BRPM | HEART RATE: 56 BPM | WEIGHT: 210 LBS | DIASTOLIC BLOOD PRESSURE: 82 MMHG

## 2019-11-12 DIAGNOSIS — H92.03 OTALGIA, BILATERAL: Primary | ICD-10-CM

## 2019-11-12 PROCEDURE — 99213 OFFICE O/P EST LOW 20 MIN: CPT | Performed by: FAMILY MEDICINE

## 2019-11-12 RX ORDER — CIPROFLOXACIN 500 MG/1
500 TABLET, FILM COATED ORAL 2 TIMES DAILY
Qty: 14 TABLET | Refills: 0 | Status: SHIPPED | OUTPATIENT
Start: 2019-11-12 | End: 2020-04-01

## 2019-11-12 NOTE — PROGRESS NOTES
SUBJECTIVE:  Daniel Samayoa is a 74 year old male who presents with bilateral ear pain and blockage for 2 week(s).   Severity: moderate   Timing:gradual onset and worsening  Additional symptoms include some itching and irritation.      History of recurrent otitis: no but has used topical abx and steroid creams with mixed results.    Had course of amox for cough recently as well    Past Medical History:   Diagnosis Date     Cataract 5/10/2012     Dyslipidemia      Hyperlipidaemia LDL goal < 100      Prostate cancer (H)      Current Outpatient Medications   Medication Sig Dispense Refill     Ascorbic Acid (C 1000 PO) Take by mouth every other day        aspirin 81 MG EC tablet Take 81 mg by mouth daily.       B Complex Vitamins (B COMPLEX 50 PO) Take  by mouth.       calcium carbonate (TUMS) 500 MG chewable tablet Take 2 chew tab by mouth daily       Calcium-Vitamin D (CALCIUM 500 +D) 500-125 MG-UNIT TABS Take  by mouth daily.       ciprofloxacin (CIPRO) 500 MG tablet Take 1 tablet (500 mg) by mouth 2 times daily 14 tablet 0     fish oil-omega-3 fatty acids (FISH OIL) 1000 MG capsule Take 2 g by mouth daily.       Ibuprofen (ADVIL) 200 MG capsule Take 200 mg by mouth every 4 hours as needed.       omeprazole (PRILOSEC) 20 MG DR capsule Take 1 capsule (20 mg) by mouth daily as needed 90 capsule 11     rosuvastatin (CRESTOR) 5 MG tablet TAKE 1 TABLET (5 MG) BY MOUTH AT BEDTIME 90 tablet 3     sildenafil (REVATIO) 20 MG tablet TAKE UP TO THREE TABLETS AS NEEDED FOR SEXUAL ACTIVITY.  Never use with nitroglycerin, terazosin or doxazosin. 90 tablet 3     triamcinolone (KENALOG) 0.1 % external cream Apply topically 2 times daily 80 g 1     Social History     Tobacco Use     Smoking status: Never Smoker     Smokeless tobacco: Never Used   Substance Use Topics     Alcohol use: Yes     Alcohol/week: 1.7 standard drinks     Types: 2 Cans of beer per week     Comment: little       ROS:   CONSTITUTIONAL:NEGATIVE for fever,  chills, change in weight  EYES: NEGATIVE for vision changes or irritation    OBJECTIVE:  /82   Pulse 56   Resp 16   Wt 95.3 kg (210 lb)   SpO2 96%   BMI 31.01 kg/m     EXAM:  The right TM is normal: no effusions, no erythema, and normal landmarks     The right auditory canal is swollen  The left TM is normal: no effusions, no erythema, and normal landmarks  The left auditory canal is swollen and tender  Oropharynx exam is normal: no lesions, erythema, adenopathy or exudate.  GENERAL: no acute distress  EYES: EOMI,  PERRL, conjunctiva clear  NECK: supple, non-tender to palpation, no adenopathy noted  SKIN: no suspicious lesions or rashes     ASSESSMENT:  Otalgia    PLAN:  Will cover for soft tissue infection and refer given duration and failure of usual remedies  See orders in Epic

## 2019-11-15 ENCOUNTER — OFFICE VISIT (OUTPATIENT)
Dept: OTOLARYNGOLOGY | Facility: CLINIC | Age: 74
End: 2019-11-15
Payer: COMMERCIAL

## 2019-11-15 VITALS
HEIGHT: 70 IN | DIASTOLIC BLOOD PRESSURE: 94 MMHG | HEART RATE: 52 BPM | WEIGHT: 210 LBS | BODY MASS INDEX: 30.06 KG/M2 | OXYGEN SATURATION: 99 % | SYSTOLIC BLOOD PRESSURE: 164 MMHG

## 2019-11-15 DIAGNOSIS — H62.43 OTITIS EXTERNA, FUNGAL, BOTH EARS: Primary | ICD-10-CM

## 2019-11-15 DIAGNOSIS — B36.9 OTITIS EXTERNA, FUNGAL, BOTH EARS: Primary | ICD-10-CM

## 2019-11-15 PROCEDURE — 99203 OFFICE O/P NEW LOW 30 MIN: CPT | Mod: 25 | Performed by: OTOLARYNGOLOGY

## 2019-11-15 PROCEDURE — 69210 REMOVE IMPACTED EAR WAX UNI: CPT | Performed by: OTOLARYNGOLOGY

## 2019-11-15 RX ORDER — CLOTRIMAZOLE 1 G/ML
SOLUTION TOPICAL
Qty: 10 ML | Refills: 1 | Status: SHIPPED | OUTPATIENT
Start: 2019-11-15 | End: 2021-09-01

## 2019-11-15 ASSESSMENT — MIFFLIN-ST. JEOR: SCORE: 1708.8

## 2019-11-15 NOTE — PROGRESS NOTES
I am seeing this patient in consultation for otalgia, bilateral at the request of the provider Dr. Davin Kimble.      Chief Complaint - ear itching    History of Present Illness - Daniel Samayoa is a 74 year old male who presents to me today with itching in both ears. Started in the left, but now in both.  It has been present and noticeable for approximately 3 weeks. No plugging. Has tried cortisporin, triamcinolone cream, benedryl, and ciprofloxacin. No otorrhea. Normal hearing. The patient notes a lot of water exposure from swimming recently and flushes his own ears. No prior ear history.      Past Medical History -   Patient Active Problem List   Diagnosis     Dyslipidemia     Health Care Home     Advance Care Planning     Cataracts, both eyes     PVD (posterior vitreous detachment), both eyes     Other male erectile dysfunction     History of prostate cancer       Current Medications -   Current Outpatient Medications:      Ascorbic Acid (C 1000 PO), Take by mouth every other day , Disp: , Rfl:      aspirin 81 MG EC tablet, Take 81 mg by mouth daily., Disp: , Rfl:      B Complex Vitamins (B COMPLEX 50 PO), Take  by mouth., Disp: , Rfl:      calcium carbonate (TUMS) 500 MG chewable tablet, Take 2 chew tab by mouth daily, Disp: , Rfl:      Calcium-Vitamin D (CALCIUM 500 +D) 500-125 MG-UNIT TABS, Take  by mouth daily., Disp: , Rfl:      ciprofloxacin (CIPRO) 500 MG tablet, Take 1 tablet (500 mg) by mouth 2 times daily, Disp: 14 tablet, Rfl: 0     fish oil-omega-3 fatty acids (FISH OIL) 1000 MG capsule, Take 2 g by mouth daily., Disp: , Rfl:      Ibuprofen (ADVIL) 200 MG capsule, Take 200 mg by mouth every 4 hours as needed., Disp: , Rfl:      omeprazole (PRILOSEC) 20 MG DR capsule, Take 1 capsule (20 mg) by mouth daily as needed, Disp: 90 capsule, Rfl: 11     rosuvastatin (CRESTOR) 5 MG tablet, TAKE 1 TABLET (5 MG) BY MOUTH AT BEDTIME, Disp: 90 tablet, Rfl: 3     sildenafil (REVATIO) 20 MG tablet, TAKE UP TO  THREE TABLETS AS NEEDED FOR SEXUAL ACTIVITY.  Never use with nitroglycerin, terazosin or doxazosin., Disp: 90 tablet, Rfl: 3     triamcinolone (KENALOG) 0.1 % external cream, Apply topically 2 times daily, Disp: 80 g, Rfl: 1    Allergies -   Allergies   Allergen Reactions     Simvastatin Other (See Comments)     Per paper chart medical records -- patient tried zocor in 7/2004 and had myalgias so discontinued.  3/2014 tried again -- myalgias again.       Social History -   Social History     Socioeconomic History     Marital status:      Spouse name: None     Number of children: None     Years of education: None     Highest education level: None   Occupational History     None   Social Needs     Financial resource strain: None     Food insecurity:     Worry: None     Inability: None     Transportation needs:     Medical: None     Non-medical: None   Tobacco Use     Smoking status: Never Smoker     Smokeless tobacco: Never Used   Substance and Sexual Activity     Alcohol use: Yes     Alcohol/week: 1.7 standard drinks     Types: 2 Cans of beer per week     Comment: little     Drug use: No     Sexual activity: None   Lifestyle     Physical activity:     Days per week: None     Minutes per session: None     Stress: None   Relationships     Social connections:     Talks on phone: None     Gets together: None     Attends Quaker service: None     Active member of club or organization: None     Attends meetings of clubs or organizations: None     Relationship status: None     Intimate partner violence:     Fear of current or ex partner: None     Emotionally abused: None     Physically abused: None     Forced sexual activity: None   Other Topics Concern     Parent/sibling w/ CABG, MI or angioplasty before 65F 55M? Not Asked   Social History Narrative     None       Family History -   Family History   Problem Relation Age of Onset     Hypertension Mother      Diabetes Mother      Lung Cancer Father      Pancreatic  "Cancer Brother      Rectal Cancer Sister      Glaucoma No family hx of      Macular Degeneration No family hx of        Review of Systems - As per HPI and PMHx, otherwise 7 system review of the head and neck negative.    Physical Exam  BP (!) 164/94   Pulse 52   Ht 1.778 m (5' 10\")   Wt 95.3 kg (210 lb)   SpO2 99%   BMI 30.13 kg/m    General - The patient is in no distress.  Alert and oriented to person and place, answers questions and cooperates with examination appropriately.   Voice and Breathing - The patient was breathing comfortably without the use of accessory muscles. There was no wheezing, stridor, or stertor.  The patients voice was clear and strong.  Ears - The left ear was examined. It showed erythema and edema of the canal. There was moist white debri impacting the canal and I couldn't see the tympanic membrane. Using the binocular microscope I used a #5 suction and suctioned and debrided the otorrhea and debri in the canal. This was very tender for the patient. I could then see the tympanic membrane. It appeared intact. No evidence of middle ear effusion. The right ear was then examined. The canal was also edematous and erythematous. Again, lots of white debri. I suctioned all of this. It appeared to be yeast. The tympanic membrane was intact and the middle ear was well aerated.   Eyes - Extraocular movements intact.  Sclera were not icteric or injected.  Neck - soft, nontender, palpation of the occipital, submental, submandibular, internal jugular chain, and supraclavicular nodes did not demonstrateany abnormal lymph nodes or masses. No parotid masses. Palpation of the thyroid was soft and smooth, with no nodules or goiter appreciated.  The trachea was mobile and midline.  Neurological - Cranial nerves 2 through 12 were grossly intact. House-Brackmann grade 1 out of 6 bilaterally.      Assessment and Plan - Daniel Samayoa is a 74 year old male has a bilateral otitis externa, likely yeast. I " debrided the canals under the microscope. I will prescribe lotrimin and have the patient return 2 weeks. I discussed keeping the ear dry, and to not place anything in the ear except for the ear drops.     Srini Felipe MD  Otolaryngology  [facility]

## 2019-11-15 NOTE — LETTER
11/15/2019         RE: Daniel Samayoa  [address]  [facility] [address]        Dear Colleague,    Thank you for referring your patient, Daniel Samayoa, to the [facility]. Please see a copy of my visit note below.    I am seeing this patient in consultation for otalgia, bilateral at the request of the provider Dr. Davin Kimble.      Chief Complaint - ear itching    History of Present Illness - Daniel Samayoa is a 74 year old male who presents to me today with itching in both ears. Started in the left, but now in both.  It has been present and noticeable for approximately 3 weeks. No plugging. Has tried cortisporin, triamcinolone cream, benedryl, and ciprofloxacin. No otorrhea. Normal hearing. The patient notes a lot of water exposure from swimming recently and flushes his own ears. No prior ear history.      Past Medical History -   Patient Active Problem List   Diagnosis     Dyslipidemia     [facility]     Advance Care Planning     Cataracts, both eyes     PVD (posterior vitreous detachment), both eyes     Other male erectile dysfunction     History of prostate cancer       Current Medications -   Current Outpatient Medications:      Ascorbic Acid (C 1000 PO), Take by mouth every other day , Disp: , Rfl:      aspirin 81 MG EC tablet, Take 81 mg by mouth daily., Disp: , Rfl:      B Complex Vitamins (B COMPLEX 50 PO), Take  by mouth., Disp: , Rfl:      calcium carbonate (TUMS) 500 MG chewable tablet, Take 2 chew tab by mouth daily, Disp: , Rfl:      Calcium-Vitamin D (CALCIUM 500 +D) 500-125 MG-UNIT TABS, Take  by mouth daily., Disp: , Rfl:      ciprofloxacin (CIPRO) 500 MG tablet, Take 1 tablet (500 mg) by mouth 2 times daily, Disp: 14 tablet, Rfl: 0     fish oil-omega-3 fatty acids (FISH OIL) 1000 MG capsule, Take 2 g by mouth daily., Disp: , Rfl:      Ibuprofen (ADVIL) 200 MG capsule, Take 200 mg by mouth every 4 hours as needed., Disp: , Rfl:      omeprazole (PRILOSEC) 20 MG  DR capsule, Take 1 capsule (20 mg) by mouth daily as needed, Disp: 90 capsule, Rfl: 11     rosuvastatin (CRESTOR) 5 MG tablet, TAKE 1 TABLET (5 MG) BY MOUTH AT BEDTIME, Disp: 90 tablet, Rfl: 3     sildenafil (REVATIO) 20 MG tablet, TAKE UP TO THREE TABLETS AS NEEDED FOR SEXUAL ACTIVITY.  Never use with nitroglycerin, terazosin or doxazosin., Disp: 90 tablet, Rfl: 3     triamcinolone (KENALOG) 0.1 % external cream, Apply topically 2 times daily, Disp: 80 g, Rfl: 1    Allergies -   Allergies   Allergen Reactions     Simvastatin Other (See Comments)     Per paper chart medical records -- patient tried zocor in 7/2004 and had myalgias so discontinued.  3/2014 tried again -- myalgias again.       Social History -   Social History     Socioeconomic History     Marital status:      Spouse name: None     Number of children: None     Years of education: None     Highest education level: None   Occupational History     None   Social Needs     Financial resource strain: None     Food insecurity:     Worry: None     Inability: None     Transportation needs:     Medical: None     Non-medical: None   Tobacco Use     Smoking status: Never Smoker     Smokeless tobacco: Never Used   Substance and Sexual Activity     Alcohol use: Yes     Alcohol/week: 1.7 standard drinks     Types: 2 Cans of beer per week     Comment: little     Drug use: No     Sexual activity: None   Lifestyle     Physical activity:     Days per week: None     Minutes per session: None     Stress: None   Relationships     Social connections:     Talks on phone: None     Gets together: None     Attends Mandaen service: None     Active member of club or organization: None     Attends meetings of clubs or organizations: None     Relationship status: None     Intimate partner violence:     Fear of current or ex partner: None     Emotionally abused: None     Physically abused: None     Forced sexual activity: None   Other Topics Concern     Parent/sibling w/  "CABG, MI or angioplasty before 65F 55M? Not Asked   Social History Narrative     None       Family History -   Family History   Problem Relation Age of Onset     Hypertension Mother      Diabetes Mother      Lung Cancer Father      Pancreatic Cancer Brother      Rectal Cancer Sister      Glaucoma No family hx of      Macular Degeneration No family hx of        Review of Systems - As per HPI and PMHx, otherwise 7 system review of the head and neck negative.    Physical Exam  BP (!) 164/94   Pulse 52   Ht 1.778 m (5' 10\")   Wt 95.3 kg (210 lb)   SpO2 99%   BMI 30.13 kg/m     General - The patient is in no distress.  Alert and oriented to person and place, answers questions and cooperates with examination appropriately.   Voice and Breathing - The patient was breathing comfortably without the use of accessory muscles. There was no wheezing, stridor, or stertor.  The patients voice was clear and strong.  Ears - The left ear was examined. It showed erythema and edema of the canal. There was moist white debri impacting the canal and I couldn't see the tympanic membrane. Using the binocular microscope I used a #5 suction and suctioned and debrided the otorrhea and debri in the canal. This was very tender for the patient. I could then see the tympanic membrane. It appeared intact. No evidence of middle ear effusion. The right ear was then examined. The canal was also edematous and erythematous. Again, lots of white debri. I suctioned all of this. It appeared to be yeast. The tympanic membrane was intact and the middle ear was well aerated.   Eyes - Extraocular movements intact.  Sclera were not icteric or injected.  Neck - soft, nontender, palpation of the occipital, submental, submandibular, internal jugular chain, and supraclavicular nodes did not demonstrateany abnormal lymph nodes or masses. No parotid masses. Palpation of the thyroid was soft and smooth, with no nodules or goiter appreciated.  The trachea was " mobile and midline.  Neurological - Cranial nerves 2 through 12 were grossly intact. House-Brackmann grade 1 out of 6 bilaterally.      Assessment and Plan - Daniel Samayoa is a 74 year old male has a bilateral otitis externa, likely yeast. I debrided the canals under the microscope. I will prescribe lotrimin and have the patient return 2 weeks. I discussed keeping the ear dry, and to not place anything in the ear except for the ear drops.     Srini Felipe MD  Otolaryngology  [facility]      Again, thank you for allowing me to participate in the care of your patient.        Sincerely,        Srini Felipe MD

## 2019-11-15 NOTE — PATIENT INSTRUCTIONS
General Scheduling Information  To schedule your CT/MRI scan, please contact [place] [facility] at [phone] [address] [place] [address]    To schedule your Surgery, please contact our Specialty Schedulers at [phone]    ENT Clinic Locations Clinic Hours Telephone Number     Nikolas Wen  [address] [place] [address]   Tuesday:       8:00am -- 4:00pm    Wednesday:  8:00am - 4:00pm   To schedule an appointment with   Dr. Felipe,   please contact our   Specialty Scheduling Department at:     [phone]       Nikolas Hayden  [address] [place] [address]   Friday:          8:00am - 4:00pm         Urgent Care Locations Clinic Hours Telephone Numbers     Nikolas Daley  [address] [place] [address]     Monday-Friday:     11:00pm - 9:00pm    Saturday-Sunday:  9:00am - 5:00pm   [phone]     Nikolas Hayden  [address] [place] [address]     Monday-Friday:      5:00pm - 9:00pm     Saturday-Sunday:  9:00am - 5:00pm   [phone]

## 2019-12-27 ENCOUNTER — TRANSFERRED RECORDS (OUTPATIENT)
Dept: FAMILY MEDICINE | Facility: CLINIC | Age: 74
End: 2019-12-27

## 2020-02-28 ENCOUNTER — MEDICAL CORRESPONDENCE (OUTPATIENT)
Dept: FAMILY MEDICINE | Facility: CLINIC | Age: 75
End: 2020-02-28

## 2020-03-31 ENCOUNTER — TELEPHONE (OUTPATIENT)
Dept: FAMILY MEDICINE | Facility: CLINIC | Age: 75
End: 2020-03-31

## 2020-03-31 NOTE — TELEPHONE ENCOUNTER
"4pm patient calls reporting high BP's last night and today. Has not been monitoring BP at home until decided to check it last night.   BP's he's collected today:  1am = 220/110   3zp=827/110  23od=297/86  1700xw=164/70    States did check his friend's BP on same machine and was normal for her.   Patient feeling well today. Reports maybe head \"felt heavy\" before bed with 220/110 BP, but no symptoms now. Denies chest pain, SOB, headache, vision changes, parethesias/weakness of face or extremities.   No hx HTN dx/meds.   Asked patient what prompted patient to check his BP now after having not monitored in long while - no clear reason given.  Advised continue monitoring BP 2 times per day and may set up video visit to discuss. Patient agrees and wants visit tomorrow if possible. Video visit scheduled with Dr. Omalley tomorrow.   Reviewed signs and symptoms of concern and call/ER if present.   Lisa Condon RN       BP Readings from Last 6 Encounters:   11/15/19 (!) 164/94   11/12/19 126/82   10/29/19 124/82   10/21/19 110/70   08/29/19 110/82   06/19/19 110/89     "

## 2020-04-01 ENCOUNTER — VIRTUAL VISIT (OUTPATIENT)
Dept: FAMILY MEDICINE | Facility: CLINIC | Age: 75
End: 2020-04-01

## 2020-04-01 DIAGNOSIS — R03.0 ELEVATED BLOOD PRESSURE READING WITHOUT DIAGNOSIS OF HYPERTENSION: Primary | ICD-10-CM

## 2020-04-01 PROCEDURE — 99213 OFFICE O/P EST LOW 20 MIN: CPT | Mod: GT | Performed by: FAMILY MEDICINE

## 2020-04-01 NOTE — PROGRESS NOTES
"  Problem(s) Oriented visit      Daniel Samayoa is being evaluated via a billable video visit.      The patient has been notified of following:     \"This video visit will be conducted via a call between you and your physician/provider. We have found that certain health care needs can be provided without the need for an in-person physical exam.  This service lets us provide the care you need with a video conversation.  If a prescription is necessary we can send it directly to your pharmacy.  If lab work is needed we can place an order for that and you can then stop by our lab to have the test done at a later time.    If during the course of the call the physician/provider feels a video visit is not appropriate, you will not be charged for this service.\"     Physician has received verbal consent for a Video Visit from the patient? Yes    SUBJECTIVE:                                                    Subjective     Daniel Samayoa is a 74 year old male who presents to clinic today for the following health issues:    HPI     Patient reports decided to check BP 2 nights ago.  Quite elevated.  Initial reading 220/110.  Gradually improved as morning went on.  He denies any headache, blurry vision, chest pain, SOB.  No unilateral weakness.  He feels fine now.  Bps today 130s/80s.  No hx HTN.  No exertional symptoms.  He is quite active.    Histories:   Patient Active Problem List   Diagnosis     Dyslipidemia     Health Care Home     Advance Care Planning     Cataracts, both eyes     PVD (posterior vitreous detachment), both eyes     Other male erectile dysfunction     History of prostate cancer     Past Surgical History:   Procedure Laterality Date     APPENDECTOMY  1980     LASER SURGERY OF EYE      correction left lense     LASIK  2 -3 years ago     left eye only     LEG SURGERY  2004    [facility]     PROSTATECTOMY RETROPUBIC RADICAL  2002    Radical at [place]       Social History     Tobacco Use     Smoking status: Never " "Smoker     Smokeless tobacco: Never Used   Substance Use Topics     Alcohol use: Yes     Alcohol/week: 1.7 standard drinks     Types: 2 Cans of beer per week     Comment: little     Family History   Problem Relation Age of Onset     Hypertension Mother      Diabetes Mother      Lung Cancer Father      Pancreatic Cancer Brother      Rectal Cancer Sister      Glaucoma No family hx of      Macular Degeneration No family hx of            Reviewed and updated as needed this visit by Provider         ROS:    A 5 system review was completed and is as noted in HPI and otherwise negative.            OBJECTIVE:                                                      Objective    There were no vitals taken for this visit.  Estimated body mass index is 30.13 kg/m  as calculated from the following:    Height as of 11/15/19: 1.778 m (5' 10\").    Weight as of 11/15/19: 95.3 kg (210 lb).    Physical Exam     Gen: well appearing, NAD  Eyes: clear  Skin: clear  Psych: normal mood and affect  Neuro: grossly normal            ASSESSMENT/PLAN:                                                      Elevated blood pressure: normal today.  No red flag symptoms.  Reassuring hx.  --monitor BID AM and PM for 1 week  --virtual follow up visit 1 week    Patient is agreement with the assessment and plan as outlined above.  All questions answered.  Red flag symptoms that should prompt emergent evaluation discussed and understood.      Video-Visit Details    Type of service:  Video Visit  Originating Location (pt. Location): Home  Distant Location (provider location):  [facility]   Mode of Communication:  Video Conference via [employer]      The following health maintenance items are reviewed in Epic and correct as of today:  Health Maintenance   Topic Date Due     ZOSTER IMMUNIZATION (2 of 3) 07/25/2013     PHQ-2  01/01/2020     EYE EXAM  02/19/2020     ADVANCE CARE PLANNING  06/24/2020     MEDICARE ANNUAL WELLNESS VISIT  08/29/2020 "     COLORECTAL CANCER SCREENING  09/21/2020     FALL RISK ASSESSMENT  10/29/2020     LIPID  08/22/2024     DTAP/TDAP/TD IMMUNIZATION (3 - Td) 08/25/2027     HEPATITIS C SCREENING  Completed     INFLUENZA VACCINE  Completed     PNEUMOCOCCAL IMMUNIZATION 65+ LOW/MEDIUM RISK  Completed     AORTIC ANEURYSM SCREENING (SYSTEM ASSIGNED)  Completed     IPV IMMUNIZATION  Aged Out     MENINGITIS IMMUNIZATION  Aged Out       Hang Omalley MD  [facility]  Family Practice  [facility]  [phone]    For any issues my office # is [phone]

## 2020-04-08 ENCOUNTER — VIRTUAL VISIT (OUTPATIENT)
Dept: FAMILY MEDICINE | Facility: CLINIC | Age: 75
End: 2020-04-08

## 2020-04-08 DIAGNOSIS — L29.9 EAR ITCHING: ICD-10-CM

## 2020-04-08 DIAGNOSIS — R03.0 ELEVATED BLOOD PRESSURE READING WITHOUT DIAGNOSIS OF HYPERTENSION: Primary | ICD-10-CM

## 2020-04-08 PROCEDURE — 99213 OFFICE O/P EST LOW 20 MIN: CPT | Mod: GT | Performed by: FAMILY MEDICINE

## 2020-04-08 RX ORDER — CLOTRIMAZOLE 1 G/ML
SOLUTION TOPICAL
Qty: 10 ML | Refills: 0 | Status: SHIPPED | OUTPATIENT
Start: 2020-04-08 | End: 2021-11-17

## 2020-04-08 NOTE — PROGRESS NOTES
"  Problem(s) Oriented visit      Daniel Samayoa is being evaluated via a billable video visit.      The patient has been notified of following:     \"This video visit will be conducted via a call between you and your physician/provider. We have found that certain health care needs can be provided without the need for an in-person physical exam.  This service lets us provide the care you need with a video conversation.  If a prescription is necessary we can send it directly to your pharmacy.  If lab work is needed we can place an order for that and you can then stop by our lab to have the test done at a later time.    If during the course of the call the physician/provider feels a video visit is not appropriate, you will not be charged for this service.\"     Physician has received verbal consent for a Video Visit from the patient? Yes    SUBJECTIVE:                                                    Subjective     Daniel Samayoa is a 74 year old male who presents to clinic today for the following health issues:    HPI     1. Elevated blood pressure reading without diagnosis of hypertension    Discussed monitoring last visit.  He is checking twice daily.  Bps 130s-150s/80s-90s.  No CP, SOB, HA, vision changes.  Making lifestyle changes.    2. Ear itching  Reports identical to issue last year.  Said he was seen by an ear specialist and prescribed clotriazole drops which resolved issue.  No pain or hearing loss.    Histories:   Patient Active Problem List   Diagnosis     Dyslipidemia     Health Care Home     Advance Care Planning     Cataracts, both eyes     PVD (posterior vitreous detachment), both eyes     Other male erectile dysfunction     History of prostate cancer     Past Surgical History:   Procedure Laterality Date     APPENDECTOMY  1980     LASER SURGERY OF EYE      correction left lense     LASIK  2 -3 years ago     left eye only     LEG SURGERY  2004    [facility]     PROSTATECTOMY RETROPUBIC RADICAL  2002    " "Radical at [place]       Social History     Tobacco Use     Smoking status: Never Smoker     Smokeless tobacco: Never Used   Substance Use Topics     Alcohol use: Yes     Alcohol/week: 1.7 standard drinks     Types: 2 Cans of beer per week     Comment: little     Family History   Problem Relation Age of Onset     Hypertension Mother      Diabetes Mother      Lung Cancer Father      Pancreatic Cancer Brother      Rectal Cancer Sister      Glaucoma No family hx of      Macular Degeneration No family hx of            Reviewed and updated as needed this visit by Provider         ROS:  5 point ROS completed and negative except noted above, including Gen, CV, Resp, GI, MS          OBJECTIVE:                                                      Objective    There were no vitals taken for this visit.  Estimated body mass index is 30.13 kg/m  as calculated from the following:    Height as of 11/15/19: 1.778 m (5' 10\").    Weight as of 11/15/19: 95.3 kg (210 lb).    Physical Exam     APPEARANCE: = Relaxed and in no distress  Conj/Eyelids = noninjected and lids and lashes are without inflammation  Mood/Affect = Cooperative and interested            ASSESSMENT/PLAN:                                                          Elevated blood pressure reading without diagnosis of hypertension: readings mildly elevated overall.  Patient prefers continued monitoring and lifestyle changes.  Recheck 1 month and if remains above goal will likely start Lisinopril    Ear itching: cleared last time with antifungal drops.  Resent Rx.  If not improved recommend in person check and consider fluocinolone gtt    -     clotrimazole (LOTRIMIN) 1 % external solution; APPLY 4 DROPS TO LEFT EAR TWICE DAILY FOR 1 WEEK        FUTURE APPOINTMENTS:       - Follow-up office or E-visit in 1 month    "

## 2020-04-30 RX ORDER — VITAMIN A 3000 MCG
CAPSULE ORAL
COMMUNITY

## 2020-05-01 ENCOUNTER — VIRTUAL VISIT (OUTPATIENT)
Dept: FAMILY MEDICINE | Facility: CLINIC | Age: 75
End: 2020-05-01

## 2020-05-01 DIAGNOSIS — R03.0 ELEVATED BLOOD PRESSURE READING WITHOUT DIAGNOSIS OF HYPERTENSION: Primary | ICD-10-CM

## 2020-05-01 PROCEDURE — 99213 OFFICE O/P EST LOW 20 MIN: CPT | Mod: GT | Performed by: FAMILY MEDICINE

## 2020-05-01 NOTE — PROGRESS NOTES
"  Problem(s) Oriented visit      Daniel Samayoa is being evaluated via a billable video visit.      The patient has been notified of following:     \"This video visit will be conducted via a call between you and your physician/provider. We have found that certain health care needs can be provided without the need for an in-person physical exam.  This service lets us provide the care you need with a video conversation.  If a prescription is necessary we can send it directly to your pharmacy.  If lab work is needed we can place an order for that and you can then stop by our lab to have the test done at a later time.    If during the course of the call the physician/provider feels a video visit is not appropriate, you will not be charged for this service.\"     Physician has received verbal consent for a Video Visit from the patient? Yes    SUBJECTIVE:                                                      Daniel Samayoa is a 74 year old male who is being seen through video consult for evaluation.  He has been monitoring BP closely for the past month.  He has taken about 30 readings.  About 1/3 SBP 120s, 1/3 SBP 130s and 1/3 SBP 140s.  No significantly elevated readings.  DBP always 70s-80s.  No chest pain, SOB.  He exercises, eats well, gardening a lot.  He would prefer to avoid medication if possible.      Histories:   Patient Active Problem List   Diagnosis     Dyslipidemia     Health Care Home     Advance Care Planning     Cataracts, both eyes     PVD (posterior vitreous detachment), both eyes     Other male erectile dysfunction     History of prostate cancer     Past Surgical History:   Procedure Laterality Date     APPENDECTOMY  1980     LASER SURGERY OF EYE      correction left lense     LASIK  2 -3 years ago     left eye only     LEG SURGERY  2004    [facility]     PROSTATECTOMY RETROPUBIC RADICAL  2002    Radical at [place]       Social History     Tobacco Use     Smoking status: Never Smoker     Smokeless tobacco: " "Never Used   Substance Use Topics     Alcohol use: Yes     Alcohol/week: 1.7 standard drinks     Types: 2 Cans of beer per week     Comment: little     Family History   Problem Relation Age of Onset     Hypertension Mother      Diabetes Mother      Lung Cancer Father      Pancreatic Cancer Brother      Rectal Cancer Sister      Glaucoma No family hx of      Macular Degeneration No family hx of            Reviewed and updated as needed this visit by Provider         ROS:    A 5 system review was completed and is as noted in HPI and otherwise negative.            OBJECTIVE:                                                      Objective    There were no vitals taken for this visit.  Estimated body mass index is 30.13 kg/m  as calculated from the following:    Height as of 11/15/19: 1.778 m (5' 10\").    Weight as of 11/15/19: 95.3 kg (210 lb).      Gen: Well appearing, NAD  Eyes: Clear  Resp: Breathing comfortably, NAD  Skin: Clear  Psych: normal mood and affect  Neuro: grossly normal              ASSESSMENT/PLAN:                                                          Elevated blood pressure reading without diagnosis of hypertension  --no antihypertensive needed at this time.  He will continue to monitor, eat well, exercise and follow up if readings escalate.      RTC 4 months for AWV, sooner if needed          "

## 2020-07-21 ENCOUNTER — TRANSFERRED RECORDS (OUTPATIENT)
Dept: FAMILY MEDICINE | Facility: CLINIC | Age: 75
End: 2020-07-21

## 2020-07-21 DIAGNOSIS — E78.5 DYSLIPIDEMIA: ICD-10-CM

## 2020-07-21 RX ORDER — ROSUVASTATIN CALCIUM 5 MG/1
TABLET, COATED ORAL
Qty: 90 TABLET | Refills: 3 | Status: SHIPPED | OUTPATIENT
Start: 2020-07-21 | End: 2021-07-01

## 2020-07-21 NOTE — TELEPHONE ENCOUNTER
Rosuvastatin. LOV VV 5/1/20. Last Lipid 8/22/19. Next appt 8/31/20.     Cholesterol   Date Value Ref Range Status   08/22/2019 162 100 - 199 mg/dL Final   08/20/2018 165 100 - 199 mg/dL Final     HDL Cholesterol   Date Value Ref Range Status   08/22/2019 48 >39 mg/dL Final   08/20/2018 62 >39 mg/dL Final     LDL Cholesterol Calculated   Date Value Ref Range Status   08/22/2019 93 0 - 99 mg/dL Final   08/20/2018 87 0 - 99 mg/dL Final     Triglycerides   Date Value Ref Range Status   08/22/2019 103 0 - 149 mg/dL Final   08/20/2018 78 0 - 149 mg/dL Final     No results found for: CHOLARTHURO

## 2020-08-24 VITALS — TEMPERATURE: 98.3 F

## 2020-08-24 DIAGNOSIS — R73.01 IFG (IMPAIRED FASTING GLUCOSE): ICD-10-CM

## 2020-08-24 DIAGNOSIS — K21.9 GASTROESOPHAGEAL REFLUX DISEASE WITHOUT ESOPHAGITIS: ICD-10-CM

## 2020-08-24 DIAGNOSIS — E78.5 DYSLIPIDEMIA: Primary | ICD-10-CM

## 2020-08-24 DIAGNOSIS — Z13.6 SCREENING FOR CARDIOVASCULAR CONDITION: ICD-10-CM

## 2020-08-24 DIAGNOSIS — R03.0 ELEVATED BLOOD PRESSURE READING WITHOUT DIAGNOSIS OF HYPERTENSION: ICD-10-CM

## 2020-08-24 DIAGNOSIS — Z85.46 HISTORY OF PROSTATE CANCER: ICD-10-CM

## 2020-08-24 LAB
% GRANULOCYTES: 51.5 % (ref 42.2–75.2)
HCT VFR BLD AUTO: 43.3 % (ref 39–51)
HEMOGLOBIN: 14.8 G/DL (ref 13.4–17.5)
LYMPHOCYTES NFR BLD AUTO: 39 % (ref 20.5–51.1)
MCH RBC QN AUTO: 31.9 PG (ref 27–31)
MCHC RBC AUTO-ENTMCNC: 34.3 G/DL (ref 33–37)
MCV RBC AUTO: 92.9 FL (ref 80–100)
MONOCYTES NFR BLD AUTO: 9.5 % (ref 1.7–9.3)
PLATELET # BLD AUTO: 177 K/UL (ref 140–450)
RBC # BLD AUTO: 4.65 X10/CMM (ref 4.2–5.9)
WBC # BLD AUTO: 6.4 X10/CMM (ref 3.8–11)

## 2020-08-24 PROCEDURE — 85025 COMPLETE CBC W/AUTO DIFF WBC: CPT | Performed by: FAMILY MEDICINE

## 2020-08-24 PROCEDURE — 36415 COLL VENOUS BLD VENIPUNCTURE: CPT | Performed by: FAMILY MEDICINE

## 2020-08-25 ENCOUNTER — OFFICE VISIT (OUTPATIENT)
Dept: OPTOMETRY | Facility: CLINIC | Age: 75
End: 2020-08-25
Payer: COMMERCIAL

## 2020-08-25 DIAGNOSIS — H25.13 NUCLEAR AGE-RELATED CATARACT, BOTH EYES: ICD-10-CM

## 2020-08-25 DIAGNOSIS — Z98.890 S/P LASIK SURGERY: ICD-10-CM

## 2020-08-25 DIAGNOSIS — H43.812 PVD (POSTERIOR VITREOUS DETACHMENT), LEFT: ICD-10-CM

## 2020-08-25 DIAGNOSIS — H52.4 PRESBYOPIA: ICD-10-CM

## 2020-08-25 DIAGNOSIS — H52.221 REGULAR ASTIGMATISM OF RIGHT EYE: ICD-10-CM

## 2020-08-25 DIAGNOSIS — Z01.01 ENCOUNTER FOR EXAMINATION OF EYES AND VISION WITH ABNORMAL FINDINGS: Primary | ICD-10-CM

## 2020-08-25 DIAGNOSIS — H52.13 MYOPIA OF BOTH EYES: ICD-10-CM

## 2020-08-25 DIAGNOSIS — R73.01 IFG (IMPAIRED FASTING GLUCOSE): ICD-10-CM

## 2020-08-25 LAB
ALBUMIN SERPL-MCNC: 3.9 G/DL (ref 3.7–4.7)
ALBUMIN/GLOB SERPL: 1.7 {RATIO} (ref 1.2–2.2)
ALP SERPL-CCNC: 74 IU/L (ref 39–117)
ALT SERPL-CCNC: 22 IU/L (ref 0–44)
AST SERPL-CCNC: 27 IU/L (ref 0–40)
BILIRUB SERPL-MCNC: 0.7 MG/DL (ref 0–1.2)
BUN SERPL-MCNC: 23 MG/DL (ref 8–27)
BUN/CREATININE RATIO: 25 (ref 10–24)
CALCIUM SERPL-MCNC: 9.1 MG/DL (ref 8.6–10.2)
CHLORIDE SERPLBLD-SCNC: 102 MMOL/L (ref 96–106)
CHOLEST SERPL-MCNC: 143 MG/DL (ref 100–199)
CREAT SERPL-MCNC: 0.93 MG/DL (ref 0.76–1.27)
EGFR IF AFRICN AM: 94 ML/MIN/1.73
EGFR IF NONAFRICN AM: 82 ML/MIN/1.73
GLOBULIN, TOTAL: 2.3 G/DL (ref 1.5–4.5)
GLUCOSE SERPL-MCNC: 103 MG/DL (ref 65–99)
HDLC SERPL-MCNC: 56 MG/DL
LDL/HDL RATIO: 1.3 RATIO (ref 0–3.6)
LDLC SERPL CALC-MCNC: 71 MG/DL (ref 0–99)
POTASSIUM SERPL-SCNC: 4.5 MMOL/L (ref 3.5–5.2)
PROT SERPL-MCNC: 6.2 G/DL (ref 6–8.5)
PSA NG/ML: <0.1 NG/ML (ref 0–4)
SODIUM SERPL-SCNC: 140 MMOL/L (ref 134–144)
TOTAL CO2: 25 MMOL/L (ref 20–29)
TRIGL SERPL-MCNC: 79 MG/DL (ref 0–149)
VLDLC SERPL CALC-MCNC: 16 MG/DL (ref 5–40)

## 2020-08-25 PROCEDURE — 92015 DETERMINE REFRACTIVE STATE: CPT | Performed by: OPTOMETRIST

## 2020-08-25 PROCEDURE — 92014 COMPRE OPH EXAM EST PT 1/>: CPT | Performed by: OPTOMETRIST

## 2020-08-25 RX ORDER — LACTOBACILLUS ACIDOPHILUS/PECT 30 MG-20MG
TABLET ORAL
COMMUNITY
End: 2021-03-22

## 2020-08-25 RX ORDER — MULTIVITAMIN WITH IRON
TABLET ORAL
COMMUNITY
End: 2021-03-22

## 2020-08-25 ASSESSMENT — EXTERNAL EXAM - RIGHT EYE: OD_EXAM: NORMAL

## 2020-08-25 ASSESSMENT — REFRACTION_WEARINGRX
OS_ADD: +2.50
OS_SPHERE: PLANO
OS_ADD: +2.25
OD_ADD: +2.50
OD_ADD: +2.25
OS_CYLINDER: SPHERE
OD_SPHERE: -2.25
SPECS_TYPE: PAL
OD_AXIS: 017
OS_CYLINDER: SPHERE
OS_SPHERE: -0.25
OD_CYLINDER: +1.25
OD_SPHERE: -2.00
OD_AXIS: 016
OD_CYLINDER: +1.25

## 2020-08-25 ASSESSMENT — TONOMETRY
IOP_METHOD: APPLANATION
OD_IOP_MMHG: 18
OS_IOP_MMHG: 16

## 2020-08-25 ASSESSMENT — VISUAL ACUITY
OS_CC: 20/20
OD_CC+: +2
OD_CC: 20/25
OS_SC: 20/40
OD_SC+: +1
CORRECTION_TYPE: GLASSES
METHOD: SNELLEN - LINEAR
OD_CC: 20/30
OS_CC: 20/50
OD_SC: 20/125

## 2020-08-25 ASSESSMENT — CUP TO DISC RATIO
OS_RATIO: 0.25
OD_RATIO: 0.25

## 2020-08-25 ASSESSMENT — CONF VISUAL FIELD
OD_NORMAL: 1
OS_NORMAL: 1
METHOD: COUNTING FINGERS

## 2020-08-25 ASSESSMENT — REFRACTION_MANIFEST
OS_CYLINDER: SPHERE
OS_SPHERE: -0.25
OS_ADD: +2.50
OD_AXIS: 017
OD_CYLINDER: +1.50
OD_SPHERE: -2.50
OD_ADD: +2.50

## 2020-08-25 ASSESSMENT — SLIT LAMP EXAM - LIDS
COMMENTS: NORMAL
COMMENTS: NORMAL

## 2020-08-25 ASSESSMENT — EXTERNAL EXAM - LEFT EYE: OS_EXAM: NORMAL

## 2020-08-25 NOTE — LETTER
8/25/2020         RE: Daniel Samayoa  [address]  [facility] [address]        Dear Colleague,    Thank you for referring your patient, Daniel Samayoa, to the [facility]. Please see a copy of my visit note below.    Chief Complaint   Patient presents with     Annual Eye Exam         Last Eye Exam: 2/2019  Dilated Previously: Yes    What are you currently using to see?  glasses    Patient has been seeing floaters sometimes.  Not currently seeing them.  Patient denies any light flashes or pain in conjunction with floaters.        Distance Vision Acuity: Satisfied with vision    Near Vision Acuity: Satisfied with vision while reading  Unaided - if very small will use reading glasses    Eye Comfort: good  Do you use eye drops? 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       OBJECTIVE: See Ophthalmology exam    ASSESSMENT:    ICD-10-CM    1. Encounter for examination of eyes and vision with abnormal findings  Z01.01 EYE EXAM (SIMPLE-NONBILLABLE)   2. Nuclear age-related cataract, both eyes  H25.13 EYE EXAM (SIMPLE-NONBILLABLE)   3. PVD (posterior vitreous detachment), left  H43.812 EYE EXAM (SIMPLE-NONBILLABLE)   4. S/P LASIK surgery - Left Eye  Z98.890 EYE EXAM (SIMPLE-NONBILLABLE)   5. Myopia of both eyes  H52.13 REFRACTION     EYE EXAM (SIMPLE-NONBILLABLE)   6. Regular astigmatism of right eye  H52.221 REFRACTION     EYE EXAM (SIMPLE-NONBILLABLE)   7. Presbyopia  H52.4 REFRACTION     EYE EXAM (SIMPLE-NONBILLABLE)      PLAN:     Patient Instructions   You have the start of mild cataracts.  You may notice some blurred vision or glare with night driving.  It is important that you wear good sunglasses to protect your eyes from the ultraviolet light from the sun.     You have a PVD- posterior  "vitreous detachment which is due to the gel of the eye shrinking and clumping together.  This can sometimes cause holes or tears in the retina.  The signs of a retinal detachment are flashes of light or a \"curtain veil\" coming over your vision. If you notice any of these changes return to clinic for re-evaluation.     Daniel was advised of today's exam findings.  Optional to fill new glasses prescription, minimal change  Copy of glasses Rx provided today.    Return in 1 year for eye exam, or sooner if needed.    The effects of the dilating drops last for 4- 6 hours.  You will be more sensitive to light and vision will be blurry up close.  Mydriatic sunglasses were given if needed.      Min Kirkland O.D.  [address]  [place]  [address]    [phone]           Again, thank you for allowing me to participate in the care of your patient.        Sincerely,        Min Kirkland, OD    "

## 2020-08-25 NOTE — PROGRESS NOTES
"Chief Complaint   Patient presents with     Annual Eye Exam         Last Eye Exam: 2/2019  Dilated Previously: Yes    What are you currently using to see?  glasses    Patient has been seeing floaters sometimes.  Not currently seeing them.  Patient denies any light flashes or pain in conjunction with floaters.        Distance Vision Acuity: Satisfied with vision    Near Vision Acuity: Satisfied with vision while reading  Unaided - if very small will use reading glasses    Eye Comfort: good  Do you use eye drops? 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       OBJECTIVE: See Ophthalmology exam    ASSESSMENT:    ICD-10-CM    1. Encounter for examination of eyes and vision with abnormal findings  Z01.01 EYE EXAM (SIMPLE-NONBILLABLE)   2. Nuclear age-related cataract, both eyes  H25.13 EYE EXAM (SIMPLE-NONBILLABLE)   3. PVD (posterior vitreous detachment), left  H43.812 EYE EXAM (SIMPLE-NONBILLABLE)   4. S/P LASIK surgery - Left Eye  Z98.890 EYE EXAM (SIMPLE-NONBILLABLE)   5. Myopia of both eyes  H52.13 REFRACTION     EYE EXAM (SIMPLE-NONBILLABLE)   6. Regular astigmatism of right eye  H52.221 REFRACTION     EYE EXAM (SIMPLE-NONBILLABLE)   7. Presbyopia  H52.4 REFRACTION     EYE EXAM (SIMPLE-NONBILLABLE)      PLAN:     Patient Instructions   You have the start of mild cataracts.  You may notice some blurred vision or glare with night driving.  It is important that you wear good sunglasses to protect your eyes from the ultraviolet light from the sun.     You have a PVD- posterior vitreous detachment which is due to the gel of the eye shrinking and clumping together.  This can sometimes cause holes or tears in the retina.  The signs of a retinal detachment are flashes of light or a \"curtain veil\" coming over your vision. " If you notice any of these changes return to clinic for re-evaluation.     Daniel was advised of today's exam findings.  Optional to fill new glasses prescription, minimal change  Copy of glasses Rx provided today.    Return in 1 year for eye exam, or sooner if needed.    The effects of the dilating drops last for 4- 6 hours.  You will be more sensitive to light and vision will be blurry up close.  Mydriatic sunglasses were given if needed.      Min Kirkland O.D.  [address] [place]  [address]    [phone]

## 2020-08-25 NOTE — PATIENT INSTRUCTIONS
"You have the start of mild cataracts.  You may notice some blurred vision or glare with night driving.  It is important that you wear good sunglasses to protect your eyes from the ultraviolet light from the sun.     You have a PVD- posterior vitreous detachment which is due to the gel of the eye shrinking and clumping together.  This can sometimes cause holes or tears in the retina.  The signs of a retinal detachment are flashes of light or a \"curtain veil\" coming over your vision. If you notice any of these changes return to clinic for re-evaluation.     Daniel was advised of today's exam findings.  Optional to fill new glasses prescription, minimal change  Copy of glasses Rx provided today.    Return in 1 year for eye exam, or sooner if needed.    The effects of the dilating drops last for 4- 6 hours.  You will be more sensitive to light and vision will be blurry up close.  Mydriatic sunglasses were given if needed.      Min Kirkland O.D.  [facility] [place]  [address]    [phone]    "

## 2020-08-26 NOTE — PROGRESS NOTES
Hang Omalley MD sent to [facility]               Please add A1c for IFG. thanks      Done today  Jina Amin MA August 26, 2020 10:57 AM

## 2020-08-27 LAB — HBA1C MFR BLD: 5.8 % (ref 4.8–5.6)

## 2020-08-28 PROBLEM — R73.03 PREDIABETES: Status: ACTIVE | Noted: 2020-08-28

## 2020-08-31 ENCOUNTER — OFFICE VISIT (OUTPATIENT)
Dept: FAMILY MEDICINE | Facility: CLINIC | Age: 75
End: 2020-08-31

## 2020-08-31 VITALS
OXYGEN SATURATION: 97 % | DIASTOLIC BLOOD PRESSURE: 75 MMHG | TEMPERATURE: 98.5 F | BODY MASS INDEX: 29.59 KG/M2 | HEART RATE: 57 BPM | SYSTOLIC BLOOD PRESSURE: 132 MMHG | WEIGHT: 206.2 LBS

## 2020-08-31 DIAGNOSIS — Z00.00 ENCOUNTER FOR MEDICARE ANNUAL WELLNESS EXAM: Primary | ICD-10-CM

## 2020-08-31 DIAGNOSIS — R03.0 ELEVATED BLOOD PRESSURE READING WITHOUT DIAGNOSIS OF HYPERTENSION: ICD-10-CM

## 2020-08-31 DIAGNOSIS — Z85.46 HISTORY OF PROSTATE CANCER: ICD-10-CM

## 2020-08-31 DIAGNOSIS — R73.03 PREDIABETES: ICD-10-CM

## 2020-08-31 DIAGNOSIS — Z90.79 S/P PROSTATECTOMY: ICD-10-CM

## 2020-08-31 DIAGNOSIS — E78.5 DYSLIPIDEMIA: ICD-10-CM

## 2020-08-31 DIAGNOSIS — L29.9 EAR ITCH: ICD-10-CM

## 2020-08-31 PROCEDURE — 93050 ART PRESSURE WAVEFORM ANALYS: CPT | Performed by: FAMILY MEDICINE

## 2020-08-31 PROCEDURE — G0439 PPPS, SUBSEQ VISIT: HCPCS | Performed by: FAMILY MEDICINE

## 2020-08-31 PROCEDURE — 99214 OFFICE O/P EST MOD 30 MIN: CPT | Mod: 25 | Performed by: FAMILY MEDICINE

## 2020-08-31 RX ORDER — FLUOCINOLONE ACETONIDE 0.11 MG/ML
5 OIL AURICULAR (OTIC)
Qty: 1 BOTTLE | Refills: 0 | Status: SHIPPED | OUTPATIENT
Start: 2020-08-31 | End: 2020-09-10

## 2020-08-31 NOTE — PROGRESS NOTES
"  SUBJECTIVE:   Daniel Samayoa is a 74 year old male who presents for Preventive Visit.    Doing well.  Home BP has been excellent.  Playing a lot of tennis.  Gardening.  Feels well.  Has noted left ear canal itching.  prviously had fungal otitis externa so has been using clotrimazole drops but not resolved.  No pain or drainage.    HLD: stable on medications.  No myalgias or previous transaminitis.  No other concerns.  Tolerating well.  Prostate Ca: s/p resection.  PSA undetectable  Prediabetes: asymptomatic  Are you in the first 12 months of your Medicare Part B coverage?  No    Physical Health:    In general, how would you rate your overall physical health? good    Outside of work, how many days during the week do you exercise? 4-5 days/week    Outside of work, approximately how many minutes a day do you exercise?30-45 minutes    If you drink alcohol do you typically have >3 drinks per day or >7 drinks per week? No    Do you usually eat at least 4 servings of fruit and vegetables a day, include whole grains & fiber and avoid regularly eating high fat or \"junk\" foods? Yes    Do you have any problems taking medications regularly?  No    Do you have any side effects from medications? none    Needs assistance for the following daily activities: no assistance needed    Which of the following safety concerns are present in your home?  None identified     Hearing impairment: No    In the past 6 months, have you been bothered by leaking of urine? no    Mental Health:    In general, how would you rate your overall mental or emotional health? good  PHQ-2 Score:  0    Do you feel safe in your environment? Yes    Have you ever done Advance Care Planning? (For example, a Health Directive, POLST, or a discussion with a medical provider or your loved ones about your wishes): Yes, advance care planning is on file.    Additional concerns to address?  YES    Fall risk:  Fallen 2 or more times in the past year?: No  Any fall with " injury in the past year?: No  click delete button to remove this line now  Cognitive Screenin) Repeat 3 items (Leader, Season, Table)    2) Clock draw: NORMAL  3) 3 item recall: Recalls 3 objects  Results: NORMAL clock, 3 items recalled    Mini-CogTM Copyright S Jese. Licensed by the author for use in [facility]; reprinted with permission (rafa@[facility]). All rights reserved.      Do you have sleep apnea, excessive snoring or daytime drowsiness?: no     Hearing frequency (Hz):    Right ear:   4000 hz: pass  2000 hz: pass  1000 hz: pass  500 hz: pass    Left ear:  4000 hz: pass  2000 hz: pass  1000 hz: pass  500 hz: pass            Reviewed and updated as needed this visit by clinical staff         Reviewed and updated as needed this visit by Provider        Social History     Tobacco Use     Smoking status: Never Smoker     Smokeless tobacco: Never Used   Substance Use Topics     Alcohol use: Yes     Alcohol/week: 1.7 standard drinks     Types: 2 Cans of beer per week     Comment: little                           Current providers sharing in care for this patient include:   Patient Care Team:  Hang Omalley MD as PCP - General (Family Practice)  Hang Omalley MD as Assigned PCP    The following health maintenance items are reviewed in Epic and correct as of today:  Health Maintenance   Topic Date Due     ZOSTER IMMUNIZATION (2 of 3) 2013     ADVANCE CARE PLANNING  2020     MEDICARE ANNUAL WELLNESS VISIT  2020     INFLUENZA VACCINE (1) 2020     COLORECTAL CANCER SCREENING  2020     FALL RISK ASSESSMENT  10/29/2020     EYE EXAM  2021     LIPID  2025     DTAP/TDAP/TD IMMUNIZATION (3 - Td) 2027     HEPATITIS C SCREENING  Completed     PHQ-2  Completed     PNEUMOCOCCAL IMMUNIZATION 65+ LOW/MEDIUM RISK  Completed     AORTIC ANEURYSM SCREENING (SYSTEM ASSIGNED)  Completed     IPV IMMUNIZATION  Aged Out     MENINGITIS IMMUNIZATION  Aged  "Out     HEPATITIS B IMMUNIZATION  Aged Out     Labs reviewed in EPIC      ROS:  Constitutional, HEENT, cardiovascular, pulmonary, GI, , musculoskeletal, neuro, skin, endocrine and psych systems are negative, except as otherwise noted.    OBJECTIVE:   There were no vitals taken for this visit. Estimated body mass index is 30.13 kg/m  as calculated from the following:    Height as of 11/15/19: 1.778 m (5' 10\").    Weight as of 11/15/19: 95.3 kg (210 lb).  EXAM:   GENERAL: healthy, alert and no distress  EYES: Eyes grossly normal to inspection, PERRL and conjunctivae and sclerae normal  HENT: ear canals and TM's normal, nose and mouth without ulcers or lesions  NECK: no adenopathy, no asymmetry, masses, or scars and thyroid normal to palpation  RESP: lungs clear to auscultation - no rales, rhonchi or wheezes  CV: regular rate and rhythm, normal S1 S2, no S3 or S4, no murmur, click or rub, no peripheral edema and peripheral pulses strong  ABDOMEN: soft, nontender, no hepatosplenomegaly, no masses and bowel sounds normal  MS: no gross musculoskeletal defects noted, no edema  SKIN: no suspicious lesions or rashes  NEURO: Normal strength and tone, mentation intact and speech normal  PSYCH: mentation appears normal, affect normal/bright    Diagnostic Test Results:  Labs reviewed in Epic    ASSESSMENT / PLAN:   1. Encounter for Medicare annual wellness exam  - discussed preventative guidelines, healthy diet, exercise and weight management  -encouraged flu and shingles vaccine, he plans to get at his pharmacy    2. Prediabetes  - cont healthy diet and exercise, weight loss    3. Dyslipidemia  - Stable, no changes, cont statin    4. S/P prostatectomy  - PSA undetectable    5. History of prostate cancer    6. Ear itch  Trial of gtt, if not improved will follow up  - fluocinolone acetonide 0.01 % OIL; Place 5 drops in ear(s) 2 times daily for 10 days  Dispense: 1 Bottle; Refill: 0    7. Elevated blood pressure reading without " "diagnosis of hypertension  - C ART PRESS WAVEFORM ANALYS CENTRAL ART PRESSURE    COUNSELING:  Reviewed preventive health counseling, as reflected in patient instructions       Regular exercise       Healthy diet/nutrition    Estimated body mass index is 30.13 kg/m  as calculated from the following:    Height as of 11/15/19: 1.778 m (5' 10\").    Weight as of 11/15/19: 95.3 kg (210 lb).    Weight management plan: Discussed healthy diet and exercise guidelines    He reports that he has never smoked. He has never used smokeless tobacco.    Appropriate preventive services were discussed with this patient, including applicable screening as appropriate for cardiovascular disease, diabetes, osteopenia/osteoporosis, and glaucoma.  As appropriate for age/gender, discussed screening for colorectal cancer, prostate cancer, breast cancer, and cervical cancer. Checklist reviewing preventive services available has been given to the patient.    Reviewed patients plan of care and provided an AVS. The Basic Care Plan (routine screening as documented in Health Maintenance) for Daniel meets the Care Plan requirement. This Care Plan has been established and reviewed with the Patient.    Counseling Resources:  ATP IV Guidelines  Pooled Cohorts Equation Calculator  Breast Cancer Risk Calculator  BRCA-Related Cancer Risk Assessment: FHS-7 Tool  FRAX Risk Assessment  ICSI Preventive Guidelines  Dietary Guidelines for Americans, 2010  USDA's MyPlate  ASA Prophylaxis  Lung CA Screening    Hang Omalley MD  [facility]  "

## 2020-08-31 NOTE — PATIENT INSTRUCTIONS
Patient Education   Personalized Prevention Plan  You are due for the preventive services outlined below.  Your care team is available to assist you in scheduling these services.  If you have already completed any of these items, please share that information with your care team to update in your medical record.  Health Maintenance Due   Topic Date Due     Zoster (Shingles) Vaccine (2 of 3) 07/25/2013     Discuss Advance Care Planning  06/24/2020     Annual Wellness Visit  08/29/2020     Flu Vaccine (1) 09/01/2020     Colorectal Cancer Screening  09/21/2020       Thank you for coming in today! If you receive a survey via My Chart, mail or phone please let us know if there was anything you especially appreciated today or if there is any way we can improve our clinic. We value your input.     Likewise, we are working hard to attend to our digital reputation.    Please consider reviewing our clinic on Google and/or [employer] via the following links:    [email or web address]    We truly appreciate you taking the time to do this.    General Information:    Today you had your appointment with Hang Omalley MD  My hours are:    Monday 8 AM - 5 PM  Tuesday: 8 AM - 5 PM  Wednesday: 8 AM - 5 PM  Fridays: 8 AM - 5 PM    I am not in the office Thursdays. Therefore, non urgent calls received on Thursday will be addressed when I am back in the office on Friday.    If lab work was done today as part of your evaluation you will generally be contacted via My Chart, mail, or phone with the results within 1-5 days. If there is an alarming result we will contact you by phone. Lab results come back at varying times, I generally wait until all labs are resulted before making comments on results. Please note labs are automatically released to My Chart once available.    If you need refills please contact your pharmacy. They will send a refill  request to me to review. Please allow 3 business days for us to process all refill requests.    Please call or send a medical message with any questions or concerns.    Thank you for choosing [facility].  We truly appreciate you trusting us with your medical care.    Sincerely,    Hang Omalley MD      Patient Education   Personalized Prevention Plan  You are due for the preventive services outlined below.  Your care team is available to assist you in scheduling these services.  If you have already completed any of these items, please share that information with your care team to update in your medical record.  Health Maintenance Due   Topic Date Due     Zoster (Shingles) Vaccine (2 of 3) 07/25/2013     Discuss Advance Care Planning  06/24/2020     Annual Wellness Visit  08/29/2020     Flu Vaccine (1) 09/01/2020     Colorectal Cancer Screening  09/21/2020

## 2020-09-22 ENCOUNTER — TRANSFERRED RECORDS (OUTPATIENT)
Dept: FAMILY MEDICINE | Facility: CLINIC | Age: 75
End: 2020-09-22

## 2020-09-28 DIAGNOSIS — K21.9 GASTROESOPHAGEAL REFLUX DISEASE WITHOUT ESOPHAGITIS: ICD-10-CM

## 2020-11-29 ENCOUNTER — HEALTH MAINTENANCE LETTER (OUTPATIENT)
Age: 75
End: 2020-11-29

## 2020-12-14 ENCOUNTER — DOCUMENTATION ONLY (OUTPATIENT)
Dept: OTHER | Facility: CLINIC | Age: 75
End: 2020-12-14

## 2020-12-22 ENCOUNTER — DOCUMENTATION ONLY (OUTPATIENT)
Dept: OTHER | Facility: CLINIC | Age: 75
End: 2020-12-22

## 2021-01-29 ENCOUNTER — TRANSFERRED RECORDS (OUTPATIENT)
Dept: FAMILY MEDICINE | Facility: CLINIC | Age: 76
End: 2021-01-29

## 2021-03-02 ENCOUNTER — TRANSFERRED RECORDS (OUTPATIENT)
Dept: FAMILY MEDICINE | Facility: CLINIC | Age: 76
End: 2021-03-02

## 2021-03-22 ENCOUNTER — OFFICE VISIT (OUTPATIENT)
Dept: FAMILY MEDICINE | Facility: CLINIC | Age: 76
End: 2021-03-22

## 2021-03-22 VITALS
DIASTOLIC BLOOD PRESSURE: 84 MMHG | HEIGHT: 70 IN | SYSTOLIC BLOOD PRESSURE: 128 MMHG | BODY MASS INDEX: 30.78 KG/M2 | OXYGEN SATURATION: 98 % | HEART RATE: 54 BPM | WEIGHT: 215 LBS

## 2021-03-22 DIAGNOSIS — L29.9 ITCHING: ICD-10-CM

## 2021-03-22 DIAGNOSIS — R60.0 BILATERAL LOWER EXTREMITY EDEMA: Primary | ICD-10-CM

## 2021-03-22 PROCEDURE — 99214 OFFICE O/P EST MOD 30 MIN: CPT | Performed by: NURSE PRACTITIONER

## 2021-03-22 ASSESSMENT — MIFFLIN-ST. JEOR: SCORE: 1726.48

## 2021-03-22 NOTE — PATIENT INSTRUCTIONS
For the back: Apply Aquaphor to the back for a barrier prior to swimming. Wash off (just a shower in general) immediately after swimming. You can use the Menphor daily the next week, Aquaphor after to help keep that barrier. Try using a moisturizer such as a Vanicream or Cerave.    For the swelling, let's get labs and an ultrasound. If labs look pretty normal and kidney function looks good, we can try a small dose of Lasix to get the fluid off. Wear your compression and elevate your legs.

## 2021-03-22 NOTE — PROGRESS NOTES
"Problem(s) Oriented visit        SUBJECTIVE:                                                    Daniel Samayoa is a 75 year old male who presents to clinic today for the following health issues :    CC: edema, itching    The past week has developed bilateral lower extremity from the feet up to the knees. Unclear etiology, no significant dietary changes or heavy salt intake. No chest pain or pressure, SOB/REECE, orthopnea, PND, unilateral pain/redness, bloating. He does not have a hx of HTN. 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    Notes an itchy rash to his back after getting out of the pool- swims 3-4 days per week, tends to happen every time he swims in a chlorinated pool- to his back only. Using Menphor which helps and a moisturizer. Takes a shower immediately after swimming.      Problem list, Medication list, Allergies, and Medical/Social/Surgical histories reviewed in EPIC and updated as appropriate.   Additional history: as documented    ROS:  Gen, CV, resp, GI, , MSK, skin negative except as listed per HPI      OBJECTIVE:                                                    Physical Exam:    /84   Pulse 54   Ht 1.778 m (5' 10\")   Wt 97.5 kg (215 lb)   SpO2 98%   BMI 30.85 kg/m      CONSTITUTIONAL: Alert non-toxic appearing male in no acute distress  RESPIRATORY: Lungs clear to auscultation, respirations unlabored  CV: Regular rate and rhythm, S1S2, no clicks, murmurs, rubs, or gallops; BLE with 1-2+ pitting edema from ankles up to knees, equal bilaterally, no warmth, tenderness, erythema, or palpable cords  GASTROINTESTINAL: Normoactive bowel sounds x4 quadrants, abdomen soft, non-distended, and non-tender to palpation  SKIN: Scattered papules and excoriations to back  NEUROLOGIC: No gross deficits  PSYCHIATRIC: Pleasant and interactive, affect euthymic, makes appropriate eye contact, " thought process logical       ASSESSMENT/PLAN:                                                          Daniel was seen today for swelling, knee pain and derm problem.    Diagnoses and all orders for this visit:    Bilateral lower extremity edema  -     Comp. Metabolic Panel (14) ([facility])  -     TSH ([facility])  -     Referral to [facility]  -     B-Type Natriuretic Peptide ([facility])    Unclear etiology, obtain labs to eval for renal/hepatic dysfunction, heart failure, thyroid dysfunction. Obtain imaging to rule out DVT. Could be due to significant OA vs venous insufficiency, though no varicosities seen on exam. Trial of compression, elevation, limiting sodium. Could consider a small short course of furosemide, but this likely will not be a solution to the underlying cause- reviewed with Dipesh and he would prefer to try compression first. Reviewed red flags and when to seek further care.    Itching  -     camphor-menthol (DERMASARRA) 0.5-0.5 % external lotion; Apply 1 mL topically every 6 hours as needed for skin care    Suspect irritation from the chlorine- he finds relief from MenPhor and would like a refill of this. Discussed emollients, moisturizers, and skin barrier as well.              The following health maintenance items are reviewed in Epic and correct as of today:  Health Maintenance   Topic Date Due     ZOSTER IMMUNIZATION (3 of 3) 03/03/2021     COVID-19 Vaccine (2 - Moderna 2-dose series) 03/26/2021     EYE EXAM  08/25/2021     MEDICARE ANNUAL WELLNESS VISIT  08/31/2021     FALL RISK ASSESSMENT  08/31/2021     LIPID  08/24/2025     COLORECTAL CANCER SCREENING  09/22/2025     ADVANCE CARE PLANNING  12/22/2025     DTAP/TDAP/TD IMMUNIZATION (3 - Td) 08/25/2027     HEPATITIS C SCREENING  Completed     PHQ-2  Completed     INFLUENZA VACCINE  Completed     Pneumococcal Vaccine: 65+ Years  Completed     AORTIC ANEURYSM SCREENING (SYSTEM ASSIGNED)  Completed     Pneumococcal Vaccine: Pediatrics (0 to 5  Years) and At-Risk Patients (6 to 64 Years)  Aged Out     IPV IMMUNIZATION  Aged Out     MENINGITIS IMMUNIZATION  Aged Out     HEPATITIS B IMMUNIZATION  Aged Out       Patient Instructions   For the back: Apply Aquaphor to the back for a barrier prior to swimming. Wash off (just a shower in general) immediately after swimming. You can use the Menphor daily the next week, Aquaphor after to help keep that barrier. Try using a moisturizer such as a Vanicream or Cerave.    For the swelling, let's get labs and an ultrasound. If labs look pretty normal and kidney function looks good, we can try a small dose of Lasix to get the fluid off. Wear your compression and elevate your legs.      ARNOLDO Giraldo CNP  [facility]  Family Practice  [facility]  [phone]    For any issues my office # is [phone]

## 2021-03-23 ENCOUNTER — TRANSFERRED RECORDS (OUTPATIENT)
Dept: FAMILY MEDICINE | Facility: CLINIC | Age: 76
End: 2021-03-23

## 2021-03-23 LAB
ALBUMIN SERPL-MCNC: 4.4 G/DL (ref 3.7–4.7)
ALBUMIN/GLOB SERPL: 2.3 {RATIO} (ref 1.2–2.2)
ALP SERPL-CCNC: 78 IU/L (ref 39–117)
ALT SERPL-CCNC: 16 IU/L (ref 0–44)
AST SERPL-CCNC: 19 IU/L (ref 0–40)
BILIRUB SERPL-MCNC: 0.4 MG/DL (ref 0–1.2)
BNP SERPL-MCNC: 47.7 PG/ML (ref 0–100)
BUN SERPL-MCNC: 13 MG/DL (ref 8–27)
BUN/CREATININE RATIO: 15 (ref 10–24)
CALCIUM SERPL-MCNC: 9.8 MG/DL (ref 8.6–10.2)
CHLORIDE SERPLBLD-SCNC: 103 MMOL/L (ref 96–106)
CREAT SERPL-MCNC: 0.87 MG/DL (ref 0.76–1.27)
EGFR IF AFRICN AM: 99 ML/MIN/1.73
EGFR IF NONAFRICN AM: 86 ML/MIN/1.73
GLOBULIN, TOTAL: 1.9 G/DL (ref 1.5–4.5)
GLUCOSE SERPL-MCNC: 97 MG/DL (ref 65–99)
POTASSIUM SERPL-SCNC: 4.5 MMOL/L (ref 3.5–5.2)
PROT SERPL-MCNC: 6.3 G/DL (ref 6–8.5)
SODIUM SERPL-SCNC: 144 MMOL/L (ref 134–144)
TOTAL CO2: 27 MMOL/L (ref 20–29)
TSH BLD-ACNC: 2.46 UIU/ML (ref 0.45–4.5)

## 2021-04-13 ENCOUNTER — TRANSFERRED RECORDS (OUTPATIENT)
Dept: FAMILY MEDICINE | Facility: CLINIC | Age: 76
End: 2021-04-13

## 2021-06-08 ENCOUNTER — TRANSFERRED RECORDS (OUTPATIENT)
Dept: FAMILY MEDICINE | Facility: CLINIC | Age: 76
End: 2021-06-08

## 2021-06-15 ENCOUNTER — OFFICE VISIT (OUTPATIENT)
Dept: FAMILY MEDICINE | Facility: CLINIC | Age: 76
End: 2021-06-15

## 2021-06-15 VITALS
WEIGHT: 208 LBS | HEART RATE: 66 BPM | TEMPERATURE: 97.8 F | SYSTOLIC BLOOD PRESSURE: 130 MMHG | OXYGEN SATURATION: 98 % | DIASTOLIC BLOOD PRESSURE: 76 MMHG | BODY MASS INDEX: 29.84 KG/M2

## 2021-06-15 DIAGNOSIS — Z01.818 PREOP GENERAL PHYSICAL EXAM: ICD-10-CM

## 2021-06-15 DIAGNOSIS — R73.03 PREDIABETES: ICD-10-CM

## 2021-06-15 DIAGNOSIS — E78.5 DYSLIPIDEMIA: ICD-10-CM

## 2021-06-15 DIAGNOSIS — Z13.0 ENCOUNTER FOR SCREENING FOR HEMATOLOGIC DISORDER: ICD-10-CM

## 2021-06-15 DIAGNOSIS — Z85.46 HISTORY OF PROSTATE CANCER: ICD-10-CM

## 2021-06-15 DIAGNOSIS — M17.0 PRIMARY OSTEOARTHRITIS OF BOTH KNEES: Primary | ICD-10-CM

## 2021-06-15 LAB
% GRANULOCYTES: 63 % (ref 42.2–75.2)
HCT VFR BLD AUTO: 40.7 % (ref 39–51)
HEMOGLOBIN: 14.3 G/DL (ref 13.4–17.5)
LYMPHOCYTES NFR BLD AUTO: 29.7 % (ref 20.5–51.1)
MCH RBC QN AUTO: 32.1 PG (ref 27–31)
MCHC RBC AUTO-ENTMCNC: 35.2 G/DL (ref 33–37)
MCV RBC AUTO: 91.2 FL (ref 80–100)
MONOCYTES NFR BLD AUTO: 7.3 % (ref 1.7–9.3)
PLATELET # BLD AUTO: 171 K/UL (ref 140–450)
RBC # BLD AUTO: 4.47 X10/CMM (ref 4.2–5.9)
WBC # BLD AUTO: 5.5 X10/CMM (ref 3.8–11)

## 2021-06-15 PROCEDURE — 93000 ELECTROCARDIOGRAM COMPLETE: CPT | Mod: 59 | Performed by: FAMILY MEDICINE

## 2021-06-15 PROCEDURE — 99215 OFFICE O/P EST HI 40 MIN: CPT | Performed by: FAMILY MEDICINE

## 2021-06-15 PROCEDURE — 85025 COMPLETE CBC W/AUTO DIFF WBC: CPT | Performed by: FAMILY MEDICINE

## 2021-06-15 PROCEDURE — 36415 COLL VENOUS BLD VENIPUNCTURE: CPT | Performed by: FAMILY MEDICINE

## 2021-06-15 NOTE — PATIENT INSTRUCTIONS

## 2021-06-15 NOTE — PROGRESS NOTES
[facility]  [address] [place] [address]  Phone: [phone]  Fax: [phone]  Primary Provider: Hang Omalley  Pre-op Performing Provider: HANG OMALLEY      PREOPERATIVE EVALUATION:  Today's date: 6/15/2021    Daniel Samayoa is a 75 year old male who presents for a preoperative evaluation.    Surgical Information:  Surgery/Procedure: Right Total Knee Arthroplasty  Surgery Location: [facility]  Surgeon: Abhishek Rankin  Surgery Date: 6/24/21  Time of Surgery: TBD  Where patient plans to recover: At home with family  Fax number for surgical facility: [phone]    Type of Anesthesia Anticipated: to be determined    Preoperative Questionnaire:   No - Have you ever had a heart attack or stroke?  No - Have you ever had surgery on your heart or blood vessels, such as a stent, coronary (heart) bypass, or surgery on an artery in the head, neck, heart, or legs?  No - Do you have chest pain when you are physically active?  No - Do you have a history of heart failure?  No - Do you currently have a cold, bronchitis, or symptoms of other respiratory (head and chest) infections?  No - Do you have a cough, shortness of breath, or wheezing?  No - Do you or anyone in your family have a history of blood clots?  No - Do you or anyone in your family have a serious bleeding problem, such as long-lasting bleeding after surgeries or cuts?  No - Have you ever had anemia or been told to take iron pills?  No - Have you had any abnormal blood loss such as black, tarry or bloody stools, or abnormal vaginal bleeding?  No - Have you ever had a blood transfusion?  Yes - Are you willing to have a blood transfusion if it is medically needed before, during, or after your surgery?  No - Have you or anyone in your family ever had problems with anesthesia (sedation for surgery)?  No - Do you have sleep apnea, excessive snoring, or daytime drowsiness?   No - Do you have any artifical  heart valves or other implanted medical devices, such as a pacemaker, defibrillator, or continuous glucose monitor?  No - Do you have any artifical joints?  No - Are you allergic to latex?  No - Is there any chance that you may be pregnant?    Assessment & Plan     The proposed surgical procedure is considered INTERMEDIATE risk.    Preop general physical exam  - CBC with Diff/Plt ([facility])  - Basic Metabolic Panel (8) ([facility])  - EKG 12-lead complete w/read - Clinics  - Hemoglobin A1C ([facility])    Prediabetes  Recheck today, continue lifestyle interventions  - Basic Metabolic Panel (8) ([facility])  - Hemoglobin A1C ([facility])  - VENOUS COLLECTION    History of prostate cancer  In remission    Dyslipidemia  stable/controlled. Cont current medication(s) and treatment  - EKG 12-lead complete w/read - Clinics  - VENOUS COLLECTION    Encounter for screening for hematologic disorder  - CBC with Diff/Plt ([facility])      Risks and Recommendations:  The patient has the following additional risks and recommendations for perioperative complications:   - No identified additional risk factors other than previously addressed    Medication Instructions:  Patient is to take all scheduled medications on the day of surgery EXCEPT for modifications listed below:   - aspirin: Discontinue aspirin 7-10 days prior to procedure to reduce bleeding risk. It should be resumed postoperatively.     RECOMMENDATION:  APPROVAL GIVEN to proceed with proposed procedure pending review of diagnostic evaluation.          Subjective     HPI related to upcoming procedure: Overall healthy 75 year old here for preop risk assessment.      HLD: controlled on statin    Prediabetes: asymptomatic      No flowsheet data found.    Health Care Directive:  Patient has a Health Care Directive on file      Preoperative Review of :   reviewed - no record of controlled substances prescribed.      Status of Chronic Conditions:  See problem list for active medical problems.   Problems all longstanding and stable, except as noted/documented.  See ROS for pertinent symptoms related to these conditions.      Review of Systems  CONSTITUTIONAL: NEGATIVE for fever, chills, change in weight  INTEGUMENTARY/SKIN: NEGATIVE for worrisome rashes, moles or lesions  EYES: NEGATIVE for vision changes or irritation  ENT/MOUTH: NEGATIVE for ear, mouth and throat problems  RESP: NEGATIVE for significant cough or SOB  BREAST: NEGATIVE for masses, tenderness or discharge  CV: NEGATIVE for chest pain, palpitations or peripheral edema  GI: NEGATIVE for nausea, abdominal pain, heartburn, or change in bowel habits  : NEGATIVE for frequency, dysuria, or hematuria  NEURO: NEGATIVE for weakness, dizziness or paresthesias  ENDOCRINE: NEGATIVE for temperature intolerance, skin/hair changes  HEME: NEGATIVE for bleeding problems  PSYCHIATRIC: NEGATIVE for changes in mood or affect    Patient Active Problem List    Diagnosis Date Noted     S/P prostatectomy 08/31/2020     Priority: Medium     Prediabetes 08/28/2020     Priority: Medium     History of prostate cancer 08/29/2019     Priority: Medium     Other male erectile dysfunction 06/19/2019     Priority: Medium     Cataracts, both eyes 12/17/2014     Priority: Medium     Very Mild       PVD (posterior vitreous detachment), both eyes 12/17/2014     Priority: Medium     Health Care Home 08/29/2013     Priority: Medium     .State Tier Level:  Tier 1   8/20/14               Dyslipidemia      Priority: Medium      Past Medical History:   Diagnosis Date     Cataract 5/10/2012     Dyslipidemia      Hyperlipidaemia LDL goal < 100      Prostate cancer (H)      Past Surgical History:   Procedure Laterality Date     APPENDECTOMY  1980     LASER SURGERY OF EYE      correction left lense     LASIK  2 -3 years ago     left eye only     LEG SURGERY  2004    [facility]     PROSTATECTOMY RETROPUBIC RADICAL  2002    Radical at [place]     Current Outpatient Medications   Medication Sig  Dispense Refill     Ascorbic Acid (C 1000 PO) Take by mouth every other day        aspirin 81 MG EC tablet Take 81 mg by mouth daily.       B Complex Vitamins (B COMPLEX 50 PO) Take  by mouth.       calcium carbonate (TUMS) 500 MG chewable tablet Take 2 chew tab by mouth daily       Calcium-Vitamin D (CALCIUM 500 +D) 500-125 MG-UNIT TABS Take  by mouth daily.       camphor-menthol (DERMASARRA) 0.5-0.5 % external lotion Apply 1 mL topically every 6 hours as needed for skin care 222 mL 1     clotrimazole (LOTRIMIN) 1 % external solution APPLY 4 DROPS TO LEFT EAR TWICE DAILY FOR 1 WEEK 10 mL 0     clotrimazole (LOTRIMIN) 1 % external solution Place 4-5 drops in each ear twice daily for 14 days. 10 mL 1     fish oil-omega-3 fatty acids (FISH OIL) 1000 MG capsule Take 2 g by mouth daily.       Ibuprofen (ADVIL) 200 MG capsule Take 200 mg by mouth every 4 hours as needed.       omeprazole (PRILOSEC) 20 MG DR capsule Take 1 capsule (20 mg) by mouth daily 90 capsule 3     Pyridoxine HCl (VITAMIN B-6 PO) Take 100 mg by mouth       rosuvastatin (CRESTOR) 5 MG tablet TAKE 1 TABLET (5 MG) BY MOUTH AT BEDTIME 90 tablet 3     sildenafil (REVATIO) 20 MG tablet TAKE UP TO THREE TABLETS AS NEEDED FOR SEXUAL ACTIVITY.  Never use with nitroglycerin, terazosin or doxazosin. 90 tablet 3     triamcinolone (KENALOG) 0.1 % external cream Apply topically 2 times daily 80 g 1     Vitamin A 7.5 MG (28271 UT) CAPS          Allergies   Allergen Reactions     Simvastatin Other (See Comments)     Per paper chart medical records -- patient tried zocor in 7/2004 and had myalgias so discontinued.  3/2014 tried again -- myalgias again.        Social History     Tobacco Use     Smoking status: Never Smoker     Smokeless tobacco: Never Used   Substance Use Topics     Alcohol use: Yes     Alcohol/week: 1.7 standard drinks     Types: 2 Cans of beer per week     Comment: little     Family History   Problem Relation Age of Onset     Hypertension Mother       Diabetes Mother      Lung Cancer Father      Pancreatic Cancer Brother      Rectal Cancer Sister      Glaucoma No family hx of      Macular Degeneration No family hx of      History   Drug Use No         Objective     There were no vitals taken for this visit.    Physical Exam    GENERAL APPEARANCE: healthy, alert and no distress     EYES: EOMI,  PERRL     HENT: ear canals and TM's normal and nose and mouth without ulcers or lesions     NECK: no adenopathy, no asymmetry, masses, or scars and thyroid normal to palpation     RESP: lungs clear to auscultation - no rales, rhonchi or wheezes     CV: regular rates and rhythm, normal S1 S2, no S3 or S4 and no murmur, click or rub     ABDOMEN:  soft, nontender, no HSM or masses and bowel sounds normal     MS: extremities normal- no gross deformities noted, no evidence of inflammation in joints, FROM in all extremities.     SKIN: no suspicious lesions or rashes     NEURO: Normal strength and tone, sensory exam grossly normal, mentation intact and speech normal     PSYCH: mentation appears normal. and affect normal/bright     LYMPHATICS: No cervical adenopathy    Recent Labs   Lab Test 03/22/21  1300 08/24/20  0945 08/24/20  0900 08/24/20 08/22/19  1400 08/22/19  1015   HGB  --   --   --  14.8  --  14.1   PLT  --   --   --  177  --  170    140  --   --   --   --    POTASSIUM 4.5 4.5  --   --   --   --    CR 0.87 0.93  --   --   --   --    A1C  --   --  5.8*  --  5.9*  --         Diagnostics:  Labs pending at this time.  Results will be reviewed when available.   EKG: sinus bradycardia, first degree AV block, no ST or T wave changes, no LVH by voltage criteria    Revised Cardiac Risk Index (RCRI):  The patient has the following serious cardiovascular risks for perioperative complications:   - No serious cardiac risks = 0 points     RCRI Interpretation: 0 points: Class I (very low risk - 0.4% complication rate)           Signed Electronically by: Hang Omalley,  MD  Copy of this evaluation report is provided to requesting physician.

## 2021-06-16 LAB
BUN SERPL-MCNC: 18 MG/DL (ref 8–27)
BUN/CREATININE RATIO: 22 (ref 10–24)
CALCIUM SERPL-MCNC: 9.4 MG/DL (ref 8.6–10.2)
CHLORIDE SERPLBLD-SCNC: 104 MMOL/L (ref 96–106)
CREAT SERPL-MCNC: 0.82 MG/DL (ref 0.76–1.27)
EGFR IF AFRICN AM: 101 ML/MIN/1.73
EGFR IF NONAFRICN AM: 88 ML/MIN/1.73
GLUCOSE SERPL-MCNC: 133 MG/DL (ref 65–99)
HBA1C MFR BLD: 5.8 % (ref 4.8–5.6)
POTASSIUM SERPL-SCNC: 4.3 MMOL/L (ref 3.5–5.2)
SODIUM SERPL-SCNC: 143 MMOL/L (ref 134–144)
TOTAL CO2: 28 MMOL/L (ref 20–29)

## 2021-06-23 NOTE — PROGRESS NOTES
6/16/21 faxed preop to [facility] @ [phone] and to [facility] @ [phone] Atten: Bakari Erickson,   [facility]  [phone]

## 2021-07-13 ENCOUNTER — TRANSFERRED RECORDS (OUTPATIENT)
Dept: FAMILY MEDICINE | Facility: CLINIC | Age: 76
End: 2021-07-13

## 2021-08-25 DIAGNOSIS — E78.5 DYSLIPIDEMIA: Primary | ICD-10-CM

## 2021-08-25 DIAGNOSIS — Z12.5 PROSTATE CANCER SCREENING: ICD-10-CM

## 2021-08-25 PROCEDURE — 36415 COLL VENOUS BLD VENIPUNCTURE: CPT | Performed by: FAMILY MEDICINE

## 2021-08-26 LAB
.: NORMAL
CHOLEST SERPL-MCNC: 149 MG/DL (ref 100–199)
HDLC SERPL-MCNC: 38 MG/DL
LDL/HDL RATIO: 2.3 RATIO (ref 0–3.6)
LDLC SERPL CALC-MCNC: 88 MG/DL (ref 0–99)
PSA NG/ML: <0.1 NG/ML (ref 0–4)
TRIGL SERPL-MCNC: 125 MG/DL (ref 0–149)
VLDLC SERPL CALC-MCNC: 23 MG/DL (ref 5–40)

## 2021-08-31 NOTE — PATIENT INSTRUCTIONS
Personalized Prevention Plan  You are due for the preventive services outlined below.  Your care team is available to assist you in scheduling these services.  If you have already completed any of these items, please share that information with your care team to update in your medical record.  Health Maintenance Due   Topic Date Due     Eye Exam  08/25/2021     FALL RISK ASSESSMENT  08/31/2021     Flu Vaccine (1) 09/01/2021

## 2021-08-31 NOTE — PROGRESS NOTES
"  SUBJECTIVE:   Daniel Samayoa is a 75 year old male who presents for Preventive Visit.    Patient has been advised of split billing requirements and indicates understanding: Yes  Are you in the first 12 months of your Medicare Part B coverage?  No    Prediabetes: stable, asymptomatic    HLD: doing well on statin    OA: s/p right TKA, doing well.  Planning left TKA next month    Physical Health:    In general, how would you rate your overall physical health? good    Outside of work, how many days during the week do you exercise? 4-5 days/week    Outside of work, approximately how many minutes a day do you exercise?greater than 60 minutes  If you drink alcohol do you typically have >3 drinks per day or >7 drinks per week? Yes - AUDIT SCORE:       No flowsheet data found.    Do you usually eat at least 4 servings of fruit and vegetables a day, include whole grains & fiber and avoid regularly eating high fat or \"junk\" foods? Yes    Do you have any problems taking medications regularly?  No    Do you have any side effects from medications? none    Needs assistance for the following daily activities: no assistance needed    Which of the following safety concerns are present in your home?  none identified     Hearing impairment: No    In the past 6 months, have you been bothered by leaking of urine? no    Mental Health:    In general, how would you rate your overall mental or emotional health? excellent  PHQ-2 Score:      Do you feel safe in your environment? Yes    Have you ever done Advance Care Planning? (For example, a Health Directive, POLST, or a discussion with a medical provider or your loved ones about your wishes): Yes, advance care planning is on file.    Fall risk:  Fallen 2 or more times in the past year?: No  Any fall with injury in the past year?: No    Cognitive Screenin) Repeat 3 items (Leader, Season, Table)    2) Clock draw: NORMAL  3) 3 item recall: Recalls 3 objects  Results: 3 items recalled: " COGNITIVE IMPAIRMENT LESS LIKELY    Mini-CogTM Copyright ALEKSANDR Sawant. Licensed by the author for use in [facility]; reprinted with permission (rafa@.[facility]). All rights reserved.      Do you have sleep apnea, excessive snoring or daytime drowsiness?: no      Reviewed and updated as needed this visit by clinical staff                 Reviewed and updated as needed this visit by Provider                Social History     Tobacco Use     Smoking status: Never Smoker     Smokeless tobacco: Never Used   Substance Use Topics     Alcohol use: Yes     Alcohol/week: 1.7 standard drinks     Types: 2 Cans of beer per week     Comment: little                           Current providers sharing in care for this patient include:   Patient Care Team:  Hang Omalley MD as PCP - General (Family Practice)  Hang Omalley MD as Assigned PCP    The following health maintenance items are reviewed in Epic and correct as of today:  Health Maintenance   Topic Date Due     EYE EXAM  08/25/2021     FALL RISK ASSESSMENT  08/31/2021     INFLUENZA VACCINE (1) 09/01/2021     MEDICARE ANNUAL WELLNESS VISIT  09/01/2022     COLORECTAL CANCER SCREENING  09/22/2025     ADVANCE CARE PLANNING  12/22/2025     LIPID  08/25/2026     DTAP/TDAP/TD IMMUNIZATION (3 - Td or Tdap) 08/25/2027     HEPATITIS C SCREENING  Completed     PHQ-2  Completed     Pneumococcal Vaccine: 65+ Years  Completed     ZOSTER IMMUNIZATION  Completed     AORTIC ANEURYSM SCREENING (SYSTEM ASSIGNED)  Completed     COVID-19 Vaccine  Completed     IPV IMMUNIZATION  Aged Out     MENINGITIS IMMUNIZATION  Aged Out     HEPATITIS B IMMUNIZATION  Aged Out     Labs reviewed in EPIC      ROS:  Constitutional, HEENT, cardiovascular, pulmonary, GI, , musculoskeletal, neuro, skin, endocrine and psych systems are negative, except as otherwise noted.    OBJECTIVE:   There were no vitals taken for this visit. Estimated body mass index is 29.84 kg/m  as calculated from the  "following:    Height as of 3/22/21: 1.778 m (5' 10\").    Weight as of 6/15/21: 94.3 kg (208 lb).  EXAM:   GENERAL: healthy, alert and no distress  EYES: Eyes grossly normal to inspection, PERRL and conjunctivae and sclerae normal  HENT: ear canals and TM's normal, nose and mouth without ulcers or lesions  NECK: no adenopathy, no asymmetry, masses, or scars and thyroid normal to palpation  RESP: lungs clear to auscultation - no rales, rhonchi or wheezes  CV: regular rate and rhythm, normal S1 S2, no S3 or S4, no murmur, click or rub, no peripheral edema and peripheral pulses strong  ABDOMEN: soft, nontender, no hepatosplenomegaly, no masses and bowel sounds normal  MS: no gross musculoskeletal defects noted, no edema  SKIN: no suspicious lesions or rashes  NEURO: Normal strength and tone, mentation intact and speech normal  PSYCH: mentation appears normal, affect normal/bright    Diagnostic Test Results:  Labs reviewed in Epic    ASSESSMENT / PLAN:     Encounter for Medicare annual wellness exam  - discussed preventative guidelines, healthy diet, exercise and weight management    Prediabetes  Stable, cont efforts at weight loss    Dyslipidemia  stable/controlled. Cont current medication(s) and treatment        Patient has been advised of split billing requirements and indicates understanding: Yes    COUNSELING:  Reviewed preventive health counseling, as reflected in patient instructions    Estimated body mass index is 29.84 kg/m  as calculated from the following:    Height as of 3/22/21: 1.778 m (5' 10\").    Weight as of 6/15/21: 94.3 kg (208 lb).    Weight management plan: Discussed healthy diet and exercise guidelines    He reports that he has never smoked. He has never used smokeless tobacco.    Appropriate preventive services were discussed with this patient, including applicable screening as appropriate for cardiovascular disease, diabetes, osteopenia/osteoporosis, and glaucoma.  As appropriate for age/gender, " discussed screening for colorectal cancer, prostate cancer, breast cancer, and cervical cancer. Checklist reviewing preventive services available has been given to the patient.    Reviewed patients plan of care and provided an AVS. The Basic Care Plan (routine screening as documented in Health Maintenance) for Daniel meets the Care Plan requirement. This Care Plan has been established and reviewed with the Patient.    Counseling Resources:  ATP IV Guidelines  Pooled Cohorts Equation Calculator  Breast Cancer Risk Calculator  BRCA-Related Cancer Risk Assessment: FHS-7 Tool  FRAX Risk Assessment  ICSI Preventive Guidelines  Dietary Guidelines for Americans, 2010  USDA's MyPlate  ASA Prophylaxis  Lung CA Screening    Hang Omalley MD  [facility]

## 2021-09-01 ENCOUNTER — OFFICE VISIT (OUTPATIENT)
Dept: FAMILY MEDICINE | Facility: CLINIC | Age: 76
End: 2021-09-01

## 2021-09-01 VITALS
DIASTOLIC BLOOD PRESSURE: 72 MMHG | BODY MASS INDEX: 30.13 KG/M2 | WEIGHT: 210 LBS | SYSTOLIC BLOOD PRESSURE: 126 MMHG | OXYGEN SATURATION: 95 % | HEART RATE: 57 BPM

## 2021-09-01 DIAGNOSIS — R73.03 PREDIABETES: ICD-10-CM

## 2021-09-01 DIAGNOSIS — E78.5 DYSLIPIDEMIA: ICD-10-CM

## 2021-09-01 DIAGNOSIS — Z00.00 ENCOUNTER FOR MEDICARE ANNUAL WELLNESS EXAM: Primary | ICD-10-CM

## 2021-09-01 PROCEDURE — 99214 OFFICE O/P EST MOD 30 MIN: CPT | Mod: 25 | Performed by: FAMILY MEDICINE

## 2021-09-01 PROCEDURE — G0439 PPPS, SUBSEQ VISIT: HCPCS | Performed by: FAMILY MEDICINE

## 2021-09-02 ENCOUNTER — DOCUMENTATION ONLY (OUTPATIENT)
Dept: OPTOMETRY | Facility: CLINIC | Age: 76
End: 2021-09-02

## 2021-09-30 NOTE — PROGRESS NOTES
[facility]  [address] [place] [address]  Phone: [phone]  Fax: [phone]  Primary Provider: Hang Omalley  Pre-op Performing Provider: HANG OMALLEY      PREOPERATIVE EVALUATION:  Today's date: 10/1/2021    Daniel Samayoa is a 75 year old male who presents for a preoperative evaluation.    Surgical Information:  Surgery/Procedure: total left knee replacement  Surgery Location: [facility]  Surgeon: Dr Abhishek Rankin  Surgery Date: 10/14/21  Time of Surgery: to be determined  Where patient plans to recover: At home with family  Fax number for surgical facility: [phone]    Type of Anesthesia Anticipated: Spinal  Preoperative Questionnaire:   No - Have you ever had a heart attack or stroke?  No - Have you ever had surgery on your heart or blood vessels, such as a stent, coronary (heart) bypass, or surgery on an artery in the head, neck, heart, or legs?  No - Do you have chest pain when you are physically active?  No - Do you have a history of heart failure?  No - Do you currently have a cold, bronchitis, or symptoms of other respiratory (head and chest) infections?  No - Do you have a cough, shortness of breath, or wheezing?  No - Do you or anyone in your family have a history of blood clots?  No - Do you or anyone in your family have a serious bleeding problem, such as long-lasting bleeding after surgeries or cuts?  No - Have you ever had anemia or been told to take iron pills?  No - Have you had any abnormal blood loss such as black, tarry or bloody stools, or abnormal vaginal bleeding?  No - Have you ever had a blood transfusion?  Yes - Are you willing to have a blood transfusion if it is medically needed before, during, or after your surgery?  No - Have you or anyone in your family ever had problems with anesthesia (sedation for surgery)?  No - Do you have sleep apnea, excessive snoring, or daytime drowsiness?   No - Do you have any artifical  heart valves or other implanted medical devices, such as a pacemaker, defibrillator, or continuous glucose monitor?  YES - DO YOU HAVE ANY ARTIFICIAL JOINTS? Right knee  No - Are you allergic to latex?  No - Is there any chance that you may be pregnant?      Assessment & Plan     The proposed surgical procedure is considered INTERMEDIATE risk.    Preop general physical exam  - EKG 12-lead complete w/read - Clinics    Dyslipidemia  On statin  - EKG 12-lead complete w/read - Clinics    Prediabetes  Asymptomatic    Primary osteoarthritis of both knees  Surgery planned      Risks and Recommendations:  The patient has the following additional risks and recommendations for perioperative complications:   - No identified additional risk factors other than previously addressed    Medication Instructions:  Patient is to take all scheduled medications on the day of surgery   - ibuprofen (Advil, Motrin): HOLD 1 day before surgery.     - aspirin: Hold 7 days before surgery    RECOMMENDATION:  APPROVAL GIVEN to proceed with proposed procedure, without further diagnostic evaluation.    [id number]}    Subjective     HPI related to upcoming procedure: Here for preoperative risk assessment prior to upcoming left TKA.  Underwent right TKA in June and has done well.  No new concerns.    HLD: stable on rosuvastatin    Prediabetes: asymptomatic      No flowsheet data found.    Health Care Directive:  Patient has a Health Care Directive on file      Preoperative Review of :   reviewed - no record of controlled substances prescribed.      Status of Chronic Conditions:  See problem list for active medical problems.  Problems all longstanding and stable, except as noted/documented.  See ROS for pertinent symptoms related to these conditions.      Review of Systems  CONSTITUTIONAL: NEGATIVE for fever, chills, change in weight  INTEGUMENTARY/SKIN: NEGATIVE for worrisome rashes, moles or lesions  EYES: NEGATIVE for vision changes or  irritation  ENT/MOUTH: NEGATIVE for ear, mouth and throat problems  RESP: NEGATIVE for significant cough or SOB  CV: NEGATIVE for chest pain, palpitations or peripheral edema  GI: NEGATIVE for nausea, abdominal pain, heartburn, or change in bowel habits  : NEGATIVE for frequency, dysuria, or hematuria  MUSCULOSKELETAL: NEGATIVE for significant arthralgias or myalgia  NEURO: NEGATIVE for weakness, dizziness or paresthesias  ENDOCRINE: NEGATIVE for temperature intolerance, skin/hair changes  HEME: NEGATIVE for bleeding problems  PSYCHIATRIC: NEGATIVE for changes in mood or affect    Patient Active Problem List    Diagnosis Date Noted     Primary osteoarthritis of both knees 06/15/2021     Priority: Medium     S/P prostatectomy 08/31/2020     Priority: Medium     Prediabetes 08/28/2020     Priority: Medium     History of prostate cancer 08/29/2019     Priority: Medium     Other male erectile dysfunction 06/19/2019     Priority: Medium     Cataracts, both eyes 12/17/2014     Priority: Medium     Very Mild       PVD (posterior vitreous detachment), both eyes 12/17/2014     Priority: Medium     Health Care Home 08/29/2013     Priority: Medium     .State Tier Level:  Tier 1   8/20/14               Dyslipidemia      Priority: Medium      Past Medical History:   Diagnosis Date     Cataract 5/10/2012     Dyslipidemia      Hyperlipidaemia LDL goal < 100      Prostate cancer (H)      Past Surgical History:   Procedure Laterality Date     APPENDECTOMY  1980     AS TOTAL KNEE ARTHROPLASTY Right     6/24/2021     LASER SURGERY OF EYE      correction left lense     LASIK  2 -3 years ago     left eye only     LEG SURGERY  2004    [facility]     PROSTATECTOMY RETROPUBIC RADICAL  2002    Radical at [facility]     SHOULDER SURGERY Right     [facility], 2019     Current Outpatient Medications   Medication Sig Dispense Refill     Ascorbic Acid (C 1000 PO) Take by mouth every other day        aspirin 81 MG EC tablet Take 81 mg by mouth daily.       B  Complex Vitamins (B COMPLEX 50 PO) Take  by mouth.       calcium carbonate (TUMS) 500 MG chewable tablet Take 2 chew tab by mouth daily       Calcium-Vitamin D (CALCIUM 500 +D) 500-125 MG-UNIT TABS Take  by mouth daily.       camphor-menthol (DERMASARRA) 0.5-0.5 % external lotion Apply 1 mL topically every 6 hours as needed for skin care 222 mL 1     clotrimazole (LOTRIMIN) 1 % external solution APPLY 4 DROPS TO LEFT EAR TWICE DAILY FOR 1 WEEK 10 mL 0     fish oil-omega-3 fatty acids (FISH OIL) 1000 MG capsule Take 2 g by mouth daily.       Ibuprofen (ADVIL) 200 MG capsule Take 200 mg by mouth every 4 hours as needed.       omeprazole (PRILOSEC) 20 MG DR capsule Take 1 capsule (20 mg) by mouth daily 90 capsule 3     Pyridoxine HCl (VITAMIN B-6 PO) Take 100 mg by mouth       rosuvastatin (CRESTOR) 5 MG tablet TAKE 1 TABLET BY MOUTH AT BEDTIME 90 tablet 3     sildenafil (REVATIO) 20 MG tablet TAKE UP TO THREE TABLETS AS NEEDED FOR SEXUAL ACTIVITY.  Never use with nitroglycerin, terazosin or doxazosin. 90 tablet 3     triamcinolone (KENALOG) 0.1 % external cream Apply topically 2 times daily 80 g 1     Vitamin A 7.5 MG (55659 UT) CAPS          Allergies   Allergen Reactions     Simvastatin Other (See Comments)     Per paper chart medical records -- patient tried zocor in 7/2004 and had myalgias so discontinued.  3/2014 tried again -- myalgias again.        Social History     Tobacco Use     Smoking status: Never Smoker     Smokeless tobacco: Never Used   Substance Use Topics     Alcohol use: Yes     Alcohol/week: 1.7 standard drinks     Types: 2 Cans of beer per week     Comment: little     Family History   Problem Relation Age of Onset     Hypertension Mother      Diabetes Mother      Lung Cancer Father      Rectal Cancer Sister      Pancreatic Cancer Brother      Pancreatic Cancer Brother      Glaucoma No family hx of      Macular Degeneration No family hx of      History   Drug Use No         Objective     /84  "  Pulse 56   Temp 97.8  F (36.6  C) (Oral)   Resp 16   Ht 1.803 m (5' 11\")   Wt 97.8 kg (215 lb 9.6 oz)   SpO2 97%   BMI 30.07 kg/m      Physical Exam    GENERAL APPEARANCE: healthy, alert and no distress     EYES: EOMI,  PERRL     HENT: ear canals and TM's normal and nose and mouth without ulcers or lesions     NECK: no adenopathy, no asymmetry, masses, or scars and thyroid normal to palpation     RESP: lungs clear to auscultation - no rales, rhonchi or wheezes     CV: regular rates and rhythm, normal S1 S2, no S3 or S4 and no murmur, click or rub     ABDOMEN:  soft, nontender, no HSM or masses and bowel sounds normal     SKIN: no suspicious lesions or rashes     NEURO: Normal strength and tone, sensory exam grossly normal, mentation intact and speech normal     PSYCH: mentation appears normal. and affect normal/bright     LYMPHATICS: No cervical adenopathy    Recent Labs   Lab Test 06/15/21  1055 06/15/21  0000 03/22/21  1300 08/24/20  0945 08/24/20  0900 08/24/20  0000   HGB  --  14.3  --   --   --  14.8   PLT  --  171  --   --   --  177     --  144   < >  --   --    POTASSIUM 4.3  --  4.5   < >  --   --    CR 0.82  --  0.87   < >  --   --    A1C 5.8*  --   --   --  5.8*  --     < > = values in this interval not displayed.        Diagnostics:  No labs were ordered during this visit.   EKG: sinus bradycardia, unchanged from previous tracings    Revised Cardiac Risk Index (RCRI):  The patient has the following serious cardiovascular risks for perioperative complications:   - No serious cardiac risks = 0 points     RCRI Interpretation: 0 points: Class I (very low risk - 0.4% complication rate)           Signed Electronically by: Hang Omalley MD  Copy of this evaluation report is provided to requesting physician.      "

## 2021-09-30 NOTE — PATIENT INSTRUCTIONS

## 2021-10-01 ENCOUNTER — OFFICE VISIT (OUTPATIENT)
Dept: FAMILY MEDICINE | Facility: CLINIC | Age: 76
End: 2021-10-01

## 2021-10-01 VITALS
BODY MASS INDEX: 30.18 KG/M2 | HEART RATE: 56 BPM | RESPIRATION RATE: 16 BRPM | WEIGHT: 215.6 LBS | SYSTOLIC BLOOD PRESSURE: 126 MMHG | DIASTOLIC BLOOD PRESSURE: 84 MMHG | TEMPERATURE: 97.8 F | HEIGHT: 71 IN | OXYGEN SATURATION: 97 %

## 2021-10-01 DIAGNOSIS — R73.03 PREDIABETES: ICD-10-CM

## 2021-10-01 DIAGNOSIS — M17.12 ARTHRITIS OF LEFT KNEE: ICD-10-CM

## 2021-10-01 DIAGNOSIS — E78.5 DYSLIPIDEMIA: ICD-10-CM

## 2021-10-01 DIAGNOSIS — Z01.818 PREOP GENERAL PHYSICAL EXAM: Primary | ICD-10-CM

## 2021-10-01 PROCEDURE — 93000 ELECTROCARDIOGRAM COMPLETE: CPT | Performed by: FAMILY MEDICINE

## 2021-10-01 PROCEDURE — 99214 OFFICE O/P EST MOD 30 MIN: CPT | Performed by: FAMILY MEDICINE

## 2021-10-01 ASSESSMENT — MIFFLIN-ST. JEOR: SCORE: 1745.09

## 2021-10-28 ENCOUNTER — OFFICE VISIT (OUTPATIENT)
Dept: OPTOMETRY | Facility: CLINIC | Age: 76
End: 2021-10-28
Payer: COMMERCIAL

## 2021-10-28 DIAGNOSIS — H43.812 PVD (POSTERIOR VITREOUS DETACHMENT), LEFT: ICD-10-CM

## 2021-10-28 DIAGNOSIS — H52.11 MYOPIA, RIGHT: ICD-10-CM

## 2021-10-28 DIAGNOSIS — Z01.01 ENCOUNTER FOR EXAMINATION OF EYES AND VISION WITH ABNORMAL FINDINGS: Primary | ICD-10-CM

## 2021-10-28 DIAGNOSIS — H25.13 NUCLEAR AGE-RELATED CATARACT, BOTH EYES: ICD-10-CM

## 2021-10-28 DIAGNOSIS — Z98.890 S/P LASIK SURGERY: ICD-10-CM

## 2021-10-28 DIAGNOSIS — H52.221 REGULAR ASTIGMATISM OF RIGHT EYE: ICD-10-CM

## 2021-10-28 DIAGNOSIS — H52.4 PRESBYOPIA: ICD-10-CM

## 2021-10-28 PROCEDURE — 92014 COMPRE OPH EXAM EST PT 1/>: CPT | Performed by: OPTOMETRIST

## 2021-10-28 PROCEDURE — 92015 DETERMINE REFRACTIVE STATE: CPT | Performed by: OPTOMETRIST

## 2021-10-28 ASSESSMENT — EXTERNAL EXAM - RIGHT EYE: OD_EXAM: NORMAL

## 2021-10-28 ASSESSMENT — REFRACTION_MANIFEST
OD_CYLINDER: +1.25
OD_SPHERE: -3.00
OS_SPHERE: PLANO
OS_CYLINDER: SPHERE
OD_ADD: +2.50
OS_ADD: +2.50
OD_AXIS: 015

## 2021-10-28 ASSESSMENT — VISUAL ACUITY
OS_CC+: -2
OD_SC: 20/80-1
OD_SC+: -1
OS_SC: 20/60-1
OS_CC: 20/25
METHOD: SNELLEN - LINEAR
OS_SC: 20/40
OS_CC: 20/30
OD_CC: 20/30-1
OD_SC: 20/80
CORRECTION_TYPE: GLASSES
OD_CC: 20/30

## 2021-10-28 ASSESSMENT — CUP TO DISC RATIO
OS_RATIO: 0.25
OD_RATIO: 0.25

## 2021-10-28 ASSESSMENT — REFRACTION_WEARINGRX
OS_SPHERE: -0.25
OD_CYLINDER: +1.50
SPECS_TYPE: READING
OD_CYLINDER: +1.25
OD_SPHERE: PLANO
SPECS_TYPE: PAL
OS_ADD: +2.50
OD_AXIS: 014
OS_CYLINDER: SPHERE
OS_SPHERE: +2.00
OD_ADD: +2.50
OD_AXIS: 017
OS_CYLINDER: SPHERE
OD_SPHERE: -2.50

## 2021-10-28 ASSESSMENT — CONF VISUAL FIELD
OD_NORMAL: 1
OS_NORMAL: 1
METHOD: COUNTING FINGERS

## 2021-10-28 ASSESSMENT — TONOMETRY
IOP_METHOD: APPLANATION
OS_IOP_MMHG: 15
OD_IOP_MMHG: 17

## 2021-10-28 ASSESSMENT — SLIT LAMP EXAM - LIDS
COMMENTS: NORMAL
COMMENTS: NORMAL

## 2021-10-28 ASSESSMENT — EXTERNAL EXAM - LEFT EYE: OS_EXAM: NORMAL

## 2021-10-28 NOTE — PATIENT INSTRUCTIONS
"You have the start of mild cataracts.  You may notice some blurred vision or glare with night driving.  It is important that you wear good sunglasses to protect your eyes from the ultraviolet light from the sun.     You have a PVD- posterior vitreous detachment which is due to the gel of the eye shrinking and clumping together.  This can sometimes cause holes or tears in the retina.  The signs of a retinal detachment are flashes of light or a \"curtain veil\" coming over your vision. If you notice any of these changes return to clinic for re-evaluation.     Updated glasses prescription provided today. Optional to fill, minimal change.   A blue light filtering lens coating is an option if you were to get new glasses. This would help limit the amount of blue light entering your eye and could help alleviate eyestrain while you are working on electronic devices.     Return in 1 year for a comprehensive eye exam, or sooner if needed.     The effects of the dilating drops last for 4- 6 hours.  You will be more sensitive to light and vision will be blurry up close.  Mydriatic sunglasses were given if needed.    Min Kirkland, OD  [facility]  [address] [place] [address]. [place]  [address]    [phone]    "

## 2021-10-28 NOTE — PROGRESS NOTES
"Chief Complaint   Patient presents with     Annual Eye Exam         Last Eye Exam: 8/25/2020  Dilated Previously: Yes, side effects of dilation explained today    What are you currently using to see?  Glasses - PAL's and reading glasses       Distance Vision Acuity: Satisfied with vision    Near Vision Acuity: Satisfied with vision while reading and using computer with glasses    Eye Comfort: good  Do you use eye drops? : No  Occupation or Hobbies: Retired    Paulette Florez        Medical, surgical and family histories reviewed and updated 10/28/2021.       OBJECTIVE: See Ophthalmology exam    ASSESSMENT:    ICD-10-CM    1. Encounter for examination of eyes and vision with abnormal findings  Z01.01    2. Nuclear age-related cataract, both eyes  H25.13    3. PVD (posterior vitreous detachment), left  H43.812    4. S/P LASIK surgery - Left Eye  Z98.890    5. Myopia, right  H52.11    6. Regular astigmatism of right eye  H52.221    7. Presbyopia  H52.4       PLAN:     Patient Instructions   You have the start of mild cataracts.  You may notice some blurred vision or glare with night driving.  It is important that you wear good sunglasses to protect your eyes from the ultraviolet light from the sun.     You have a PVD- posterior vitreous detachment which is due to the gel of the eye shrinking and clumping together.  This can sometimes cause holes or tears in the retina.  The signs of a retinal detachment are flashes of light or a \"curtain veil\" coming over your vision. If you notice any of these changes return to clinic for re-evaluation.     Updated glasses prescription provided today. Optional to fill, minimal change.   A blue light filtering lens coating is an option if you were to get new glasses. This would help limit the amount of blue light entering your eye and could help alleviate eyestrain while you are working on electronic devices.     Return in 1 year for a comprehensive eye exam, or sooner if needed.     The " effects of the dilating drops last for 4- 6 hours.  You will be more sensitive to light and vision will be blurry up close.  Mydriatic sunglasses were given if needed.    Min Kirkland, LETICIA  [address]  [place]  [address]    [phone]

## 2021-10-28 NOTE — LETTER
"    10/28/2021         RE: Daniel Samayoa  [address]  [facility] [address]        Dear Colleague,    Thank you for referring your patient, Daniel Samayoa, to the [facility]. Please see a copy of my visit note below.    Chief Complaint   Patient presents with     Annual Eye Exam         Last Eye Exam: 8/25/2020  Dilated Previously: Yes, side effects of dilation explained today    What are you currently using to see?  Glasses - PAL's and reading glasses       Distance Vision Acuity: Satisfied with vision    Near Vision Acuity: Satisfied with vision while reading and using computer with glasses    Eye Comfort: good  Do you use eye drops? : No  Occupation or Hobbies: Retired    Paulette [facility]        Medical, surgical and family histories reviewed and updated 10/28/2021.       OBJECTIVE: See Ophthalmology exam    ASSESSMENT:    ICD-10-CM    1. Encounter for examination of eyes and vision with abnormal findings  Z01.01    2. Nuclear age-related cataract, both eyes  H25.13    3. PVD (posterior vitreous detachment), left  H43.812    4. S/P LASIK surgery - Left Eye  Z98.890    5. Myopia, right  H52.11    6. Regular astigmatism of right eye  H52.221    7. Presbyopia  H52.4       PLAN:     Patient Instructions   You have the start of mild cataracts.  You may notice some blurred vision or glare with night driving.  It is important that you wear good sunglasses to protect your eyes from the ultraviolet light from the sun.     You have a PVD- posterior vitreous detachment which is due to the gel of the eye shrinking and clumping together.  This can sometimes cause holes or tears in the retina.  The signs of a retinal detachment are flashes of light or a \"curtain veil\" coming over your vision. If you notice any of these changes return to clinic for re-evaluation.     Updated glasses prescription provided today. Optional to fill, minimal change.   A blue light filtering lens coating is an option if " you were to get new glasses. This would help limit the amount of blue light entering your eye and could help alleviate eyestrain while you are working on electronic devices.     Return in 1 year for a comprehensive eye exam, or sooner if needed.     The effects of the dilating drops last for 4- 6 hours.  You will be more sensitive to light and vision will be blurry up close.  Mydriatic sunglasses were given if needed.    Min Kirkland OD  [address]  [place]  [address] [phone]             Again, thank you for allowing me to participate in the care of your patient.        Sincerely,        Min Kirkland OD

## 2021-11-17 ENCOUNTER — OFFICE VISIT (OUTPATIENT)
Dept: FAMILY MEDICINE | Facility: CLINIC | Age: 76
End: 2021-11-17

## 2021-11-17 VITALS
OXYGEN SATURATION: 96 % | BODY MASS INDEX: 28.56 KG/M2 | RESPIRATION RATE: 16 BRPM | WEIGHT: 204 LBS | HEIGHT: 71 IN | SYSTOLIC BLOOD PRESSURE: 118 MMHG | HEART RATE: 64 BPM | DIASTOLIC BLOOD PRESSURE: 78 MMHG

## 2021-11-17 DIAGNOSIS — E78.5 DYSLIPIDEMIA: ICD-10-CM

## 2021-11-17 DIAGNOSIS — M17.0 PRIMARY OSTEOARTHRITIS OF BOTH KNEES: ICD-10-CM

## 2021-11-17 DIAGNOSIS — Z01.818 PREOP GENERAL PHYSICAL EXAM: Primary | ICD-10-CM

## 2021-11-17 DIAGNOSIS — R73.03 PREDIABETES: ICD-10-CM

## 2021-11-17 DIAGNOSIS — H61.23 BILATERAL HEARING LOSS DUE TO CERUMEN IMPACTION: ICD-10-CM

## 2021-11-17 PROCEDURE — 69209 REMOVE IMPACTED EAR WAX UNI: CPT | Mod: RT | Performed by: FAMILY MEDICINE

## 2021-11-17 PROCEDURE — 99214 OFFICE O/P EST MOD 30 MIN: CPT | Mod: 25 | Performed by: FAMILY MEDICINE

## 2021-11-17 ASSESSMENT — MIFFLIN-ST. JEOR: SCORE: 1687.47

## 2021-11-17 NOTE — PATIENT INSTRUCTIONS

## 2021-11-17 NOTE — PROGRESS NOTES
Addendum: Although the planned surgery was delayed, this patient has had no meaningful changes that would result in need for in person re-evaluation.  There are no apparent contraindications to his planned surgery.  Updated labs reviewed and normal.      [facility] [address] [place] [address]  Phone: [phone]  Fax: [phone]  Primary Provider: Hang Omalley  Pre-op Performing Provider: HANG OMALLEY      PREOPERATIVE EVALUATION:  Today's date: 11/17/2021  Preoperative Questionnaire:   No - Have you ever had a heart attack or stroke?  No - Have you ever had surgery on your heart or blood vessels, such as a stent, coronary (heart) bypass, or surgery on an artery in the head, neck, heart, or legs?  No - Do you have chest pain when you are physically active?  No - Do you have a history of heart failure?  No - Do you currently have a cold, bronchitis, or symptoms of other respiratory (head and chest) infections?  No - Do you have a cough, shortness of breath, or wheezing?  No - Do you or anyone in your family have a history of blood clots?  No - Do you or anyone in your family have a serious bleeding problem, such as long-lasting bleeding after surgeries or cuts?  No - Have you ever had anemia or been told to take iron pills?  No - Have you had any abnormal blood loss such as black, tarry or bloody stools, or abnormal vaginal bleeding?  No - Have you ever had a blood transfusion?  Yes - Are you willing to have a blood transfusion if it is medically needed before, during, or after your surgery?  No - Have you or anyone in your family ever had problems with anesthesia (sedation for surgery)?  No - Do you have sleep apnea, excessive snoring, or daytime drowsiness?   No - Do you have any artifical heart valves or other implanted medical devices, such as a pacemaker, defibrillator, or continuous glucose monitor?  No - Do you have any artifical joints?  No - Are you  allergic to latex?  No - Is there any chance that you may be pregnant?        Daniel Samayoa is a 76 year old male who presents for a preoperative evaluation.    Surgical Information:  Surgery/Procedure: Left Knee replace  Surgery Location: [facility]  Surgeon: Joanie  Surgery Date: 11/24/21     Time of Surgery: TBD  Where patient plans to recover: At home with family  Fax number for surgical facility: [phone]    Type of Anesthesia Anticipated: to be determined    Assessment & Plan     The proposed surgical procedure is considered INTERMEDIATE risk.    Preop general physical exam  No contraindications    Primary osteoarthritis of both knees  Planned surgery    Bilateral hearing loss due to cerumen impaction  - Removal Impacted Cerumen Irrigation Unilateral (Ear Wash)    Prediabetes  Stable, mild    Dyslipidemia  Cont statin        Risks and Recommendations:  The patient has the following additional risks and recommendations for perioperative complications:   - No identified additional risk factors other than previously addressed    Medication Instructions:  Patient is to take all scheduled medications on the day of surgery EXCEPT for modifications listed below:   - aspirin: Discontinue aspirin 7-10 days prior to procedure to reduce bleeding risk. It should be resumed postoperatively.    - Statins: Continue taking on the day of surgery.    - hold fish oil until after surgery    RECOMMENDATION:  APPROVAL GIVEN to proceed with proposed procedure, without further diagnostic evaluation.    [id number]}    Subjective     HPI related to upcoming procedure: Dipesh is back for repeat preoperative risk assessment as his surgery was canceled.  He feels well.  There have been no changes.        No flowsheet data found.    Health Care Directive:  Patient has a Health Care Directive on file      Preoperative Review of :   reviewed - no record of controlled substances prescribed.      Status of Chronic Conditions:  See  problem list for active medical problems.  Problems all longstanding and stable, except as noted/documented.  See ROS for pertinent symptoms related to these conditions.      Review of Systems  CONSTITUTIONAL: NEGATIVE for fever, chills, change in weight  INTEGUMENTARY/SKIN: NEGATIVE for worrisome rashes, moles or lesions  EYES: NEGATIVE for vision changes or irritation  ENT/MOUTH: NEGATIVE for ear, mouth and throat problems  RESP: NEGATIVE for significant cough or SOB  CV: NEGATIVE for chest pain, palpitations or peripheral edema  GI: NEGATIVE for nausea, abdominal pain, heartburn, or change in bowel habits  : NEGATIVE for frequency, dysuria, or hematuria  MUSCULOSKELETAL: NEGATIVE for significant arthralgias or myalgia  NEURO: NEGATIVE for weakness, dizziness or paresthesias  ENDOCRINE: NEGATIVE for temperature intolerance, skin/hair changes  HEME: NEGATIVE for bleeding problems  PSYCHIATRIC: NEGATIVE for changes in mood or affect    Patient Active Problem List    Diagnosis Date Noted     Primary osteoarthritis of both knees 06/15/2021     Priority: Medium     S/P prostatectomy 08/31/2020     Priority: Medium     Prediabetes 08/28/2020     Priority: Medium     History of prostate cancer 08/29/2019     Priority: Medium     Other male erectile dysfunction 06/19/2019     Priority: Medium     Cataracts, both eyes 12/17/2014     Priority: Medium     Very Mild       PVD (posterior vitreous detachment), both eyes 12/17/2014     Priority: Medium     Health Care Home 08/29/2013     Priority: Medium     .State Tier Level:  Tier 1   8/20/14               Dyslipidemia      Priority: Medium      Past Medical History:   Diagnosis Date     Cataract 5/10/2012     Dyslipidemia      Hyperlipidaemia LDL goal < 100      Prostate cancer (H)      Past Surgical History:   Procedure Laterality Date     APPENDECTOMY  1980     AS TOTAL KNEE ARTHROPLASTY Right     6/24/2021     LASER SURGERY OF EYE      correction left lense     LASIK  2  -3 years ago     left eye only     LEG SURGERY  2004    [facility]     PROSTATECTOMY RETROPUBIC RADICAL  2002    Radical at [place]     SHOULDER SURGERY Right     [facility], 2019     Current Outpatient Medications   Medication Sig Dispense Refill     Ascorbic Acid (C 1000 PO) Take by mouth every other day        aspirin 81 MG EC tablet Take 81 mg by mouth daily.       B Complex Vitamins (B COMPLEX 50 PO) Take  by mouth.       calcium carbonate (TUMS) 500 MG chewable tablet Take 2 chew tab by mouth daily       Calcium-Vitamin D (CALCIUM 500 +D) 500-125 MG-UNIT TABS Take  by mouth daily.       camphor-menthol (DERMASARRA) 0.5-0.5 % external lotion Apply 1 mL topically every 6 hours as needed for skin care 222 mL 1     clotrimazole (LOTRIMIN) 1 % external solution APPLY 4 DROPS TO LEFT EAR TWICE DAILY FOR 1 WEEK 10 mL 0     fish oil-omega-3 fatty acids (FISH OIL) 1000 MG capsule Take 2 g by mouth daily.       Ibuprofen (ADVIL) 200 MG capsule Take 200 mg by mouth every 4 hours as needed.       omeprazole (PRILOSEC) 20 MG DR capsule Take 1 capsule (20 mg) by mouth daily 90 capsule 3     Pyridoxine HCl (VITAMIN B-6 PO) Take 100 mg by mouth       rosuvastatin (CRESTOR) 5 MG tablet TAKE 1 TABLET BY MOUTH AT BEDTIME 90 tablet 3     sildenafil (REVATIO) 20 MG tablet TAKE UP TO THREE TABLETS AS NEEDED FOR SEXUAL ACTIVITY.  Never use with nitroglycerin, terazosin or doxazosin. 90 tablet 3     triamcinolone (KENALOG) 0.1 % external cream Apply topically 2 times daily 80 g 1     Vitamin A 7.5 MG (63005 UT) CAPS          Allergies   Allergen Reactions     Simvastatin Other (See Comments)     Per paper chart medical records -- patient tried zocor in 7/2004 and had myalgias so discontinued.  3/2014 tried again -- myalgias again.        Social History     Tobacco Use     Smoking status: Never Smoker     Smokeless tobacco: Never Used   Substance Use Topics     Alcohol use: Yes     Alcohol/week: 1.7 standard drinks     Types: 2 Cans of beer per week  "    Comment: little     Family History   Problem Relation Age of Onset     Hypertension Mother      Diabetes Mother      Lung Cancer Father      Rectal Cancer Sister      Pancreatic Cancer Brother      Pancreatic Cancer Brother      Glaucoma No family hx of      Macular Degeneration No family hx of      History   Drug Use No         Objective     Ht 1.803 m (5' 11\")   BMI 30.07 kg/m      Physical Exam    GENERAL APPEARANCE: healthy, alert and no distress     EYES: EOMI,  PERRL     HENT: ear canals and TM's normal and nose and mouth without ulcers or lesions     NECK: no adenopathy, no asymmetry, masses, or scars and thyroid normal to palpation     RESP: lungs clear to auscultation - no rales, rhonchi or wheezes     CV: regular rates and rhythm, normal S1 S2, no S3 or S4 and no murmur, click or rub     ABDOMEN:  soft, nontender, no HSM or masses and bowel sounds normal     SKIN: no suspicious lesions or rashes     NEURO: Normal strength and tone, sensory exam grossly normal, mentation intact and speech normal     PSYCH: mentation appears normal. and affect normal/bright     LYMPHATICS: No cervical adenopathy    Recent Labs   Lab Test 06/15/21  1055 06/15/21  0000 03/22/21  1300 08/24/20  0945 08/24/20  0900 08/24/20  0000   HGB  --  14.3  --   --   --  14.8   PLT  --  171  --   --   --  177     --  144   < >  --   --    POTASSIUM 4.3  --  4.5   < >  --   --    CR 0.82  --  0.87   < >  --   --    A1C 5.8*  --   --   --  5.8*  --     < > = values in this interval not displayed.        Diagnostics:  No labs were ordered during this visit.   No EKG this visit, completed in the last 90 days.    Revised Cardiac Risk Index (RCRI):  The patient has the following serious cardiovascular risks for perioperative complications:   - No serious cardiac risks = 0 points     RCRI Interpretation: 0 points: Class I (very low risk - 0.4% complication rate)           Signed Electronically by: Hang Omalley MD  Copy of this " evaluation report is provided to requesting physician.

## 2021-11-22 NOTE — PROGRESS NOTES
11/17/21 faxed preop to [facility] @ [phone]    Bakari Erickson,   [facility]  [phone]

## 2021-12-10 ENCOUNTER — TELEPHONE (OUTPATIENT)
Dept: FAMILY MEDICINE | Facility: CLINIC | Age: 76
End: 2021-12-10

## 2021-12-10 DIAGNOSIS — Z13.228 SCREENING FOR METABOLIC DISORDER: Primary | ICD-10-CM

## 2021-12-10 DIAGNOSIS — Z13.0 ENCOUNTER FOR SCREENING FOR HEMATOLOGIC DISORDER: ICD-10-CM

## 2021-12-10 DIAGNOSIS — Z01.818 PREOPERATIVE EXAMINATION: ICD-10-CM

## 2021-12-10 NOTE — TELEPHONE ENCOUNTER
Patient TKA was rescheduled from 11/24/21 to 12/22/21. Per Daphne RN @ [facility] patient will need updated CBC and BMP and they are requesting addendum on pre-op done 11/17/21 to OK patient for surgery 12/22/21. Patient is scheduled for these labs 12/13/21, orders entered. Will fax addended office visit note to Daphne @ [phone] once lab results are in. Stephanie Ayala

## 2021-12-13 DIAGNOSIS — Z13.0 ENCOUNTER FOR SCREENING FOR HEMATOLOGIC DISORDER: ICD-10-CM

## 2021-12-13 DIAGNOSIS — Z13.228 SCREENING FOR METABOLIC DISORDER: ICD-10-CM

## 2021-12-13 DIAGNOSIS — Z01.818 PREOPERATIVE EXAMINATION: ICD-10-CM

## 2021-12-13 LAB
% GRANULOCYTES: 67.2 % (ref 42.2–75.2)
HCT VFR BLD AUTO: 41.6 % (ref 39–51)
HEMOGLOBIN: 13.8 G/DL (ref 13.4–17.5)
LYMPHOCYTES NFR BLD AUTO: 25.3 % (ref 20.5–51.1)
MCH RBC QN AUTO: 30.7 PG (ref 27–31)
MCHC RBC AUTO-ENTMCNC: 33.2 G/DL (ref 33–37)
MCV RBC AUTO: 92.3 FL (ref 80–100)
MONOCYTES NFR BLD AUTO: 7.5 % (ref 1.7–9.3)
PLATELET # BLD AUTO: 202 K/UL (ref 140–450)
RBC # BLD AUTO: 4.5 X10/CMM (ref 4.2–5.9)
WBC # BLD AUTO: 6.1 X10/CMM (ref 3.8–11)

## 2021-12-13 PROCEDURE — 85025 COMPLETE CBC W/AUTO DIFF WBC: CPT | Performed by: FAMILY MEDICINE

## 2021-12-13 PROCEDURE — 36415 COLL VENOUS BLD VENIPUNCTURE: CPT | Performed by: FAMILY MEDICINE

## 2021-12-15 LAB
BUN SERPL-MCNC: 20 MG/DL (ref 8–27)
BUN/CREATININE RATIO: 22 (ref 10–24)
CALCIUM SERPL-MCNC: 9.4 MG/DL (ref 8.6–10.2)
CHLORIDE SERPLBLD-SCNC: 105 MMOL/L (ref 96–106)
CREAT SERPL-MCNC: 0.93 MG/DL (ref 0.76–1.27)
EGFR IF AFRICN AM: 93 ML/MIN/1.73
EGFR IF NONAFRICN AM: 81 ML/MIN/1.73
GLUCOSE SERPL-MCNC: 102 MG/DL (ref 65–99)
POTASSIUM SERPL-SCNC: 4.4 MMOL/L (ref 3.5–5.2)
SODIUM SERPL-SCNC: 144 MMOL/L (ref 134–144)
TOTAL CO2: 23 MMOL/L (ref 20–29)

## 2021-12-16 NOTE — PROGRESS NOTES
12/15/21 this report was faxed to JAVON, Atten: Daphne @ [phone] along with labs from 12/13/21    Bakari Erickson,   [facility]  [phone]

## 2021-12-22 ENCOUNTER — TRANSFERRED RECORDS (OUTPATIENT)
Dept: FAMILY MEDICINE | Facility: CLINIC | Age: 76
End: 2021-12-22

## 2022-01-05 ENCOUNTER — TRANSFERRED RECORDS (OUTPATIENT)
Dept: FAMILY MEDICINE | Facility: CLINIC | Age: 77
End: 2022-01-05

## 2022-01-27 DIAGNOSIS — K21.9 GASTROESOPHAGEAL REFLUX DISEASE WITHOUT ESOPHAGITIS: ICD-10-CM

## 2022-02-02 ENCOUNTER — TRANSFERRED RECORDS (OUTPATIENT)
Dept: FAMILY MEDICINE | Facility: CLINIC | Age: 77
End: 2022-02-02

## 2022-07-23 DIAGNOSIS — E78.5 DYSLIPIDEMIA: ICD-10-CM

## 2022-07-25 NOTE — TELEPHONE ENCOUNTER
ROSUVASTATIN  LOV (PREOP) 11/17/21  LOV (CPX) 9/1/21  LAST LABS 8/25/21    Lab Results   Component Value Date    CHOL 149 08/25/2021    CHOL 143 08/24/2020     Lab Results   Component Value Date    HDL 38 08/25/2021    HDL 56 08/24/2020     Lab Results   Component Value Date    LDL 88 08/25/2021    LDL 71 08/24/2020     Lab Results   Component Value Date    TRIG 125 08/25/2021    TRIG 79 08/24/2020     No results found for: CHOLHDLRATIO\

## 2022-07-26 RX ORDER — ROSUVASTATIN CALCIUM 5 MG/1
TABLET, COATED ORAL
Qty: 90 TABLET | Refills: 0 | Status: SHIPPED | OUTPATIENT
Start: 2022-07-26 | End: 2022-10-07

## 2022-08-29 DIAGNOSIS — Z90.79 S/P PROSTATECTOMY: ICD-10-CM

## 2022-08-29 DIAGNOSIS — Z13.228 ENCOUNTER FOR SCREENING FOR METABOLIC DISORDER: ICD-10-CM

## 2022-08-29 DIAGNOSIS — R73.03 PREDIABETES: ICD-10-CM

## 2022-08-29 DIAGNOSIS — Z13.0 ENCOUNTER FOR SCREENING FOR HEMATOLOGIC DISORDER: ICD-10-CM

## 2022-08-29 DIAGNOSIS — E78.5 DYSLIPIDEMIA: Primary | ICD-10-CM

## 2022-08-29 LAB
% GRANULOCYTES: 58 % (ref 42.2–75.2)
CHOL/HDL RATIO (RMG): 2.5 MG/DL (ref 0–4.5)
CHOLESTEROL: 128 MG/DL (ref 100–199)
HCT VFR BLD AUTO: 40.8 % (ref 39–51)
HDL (RMG): 51 MG/DL (ref 40–?)
HEMOGLOBIN: 14.4 G/DL (ref 13.4–17.5)
LDL CALCULATED (RMG): 68 MG/DL (ref 0–130)
LYMPHOCYTES NFR BLD AUTO: 34.9 % (ref 20.5–51.1)
MCH RBC QN AUTO: 31.9 PG (ref 27–31)
MCHC RBC AUTO-ENTMCNC: 35.3 G/DL (ref 33–37)
MCV RBC AUTO: 90.3 FL (ref 80–100)
MONOCYTES NFR BLD AUTO: 7.1 % (ref 1.7–9.3)
PLATELET # BLD AUTO: 172 K/UL (ref 140–450)
RBC # BLD AUTO: 4.52 X10/CMM (ref 4.2–5.9)
TRIGLYCERIDES (RMG): 48 MG/DL (ref 0–149)
WBC # BLD AUTO: 4.8 X10/CMM (ref 3.8–11)

## 2022-08-29 PROCEDURE — 36415 COLL VENOUS BLD VENIPUNCTURE: CPT | Performed by: FAMILY MEDICINE

## 2022-08-29 PROCEDURE — 80061 LIPID PANEL: CPT | Mod: QW | Performed by: FAMILY MEDICINE

## 2022-08-29 PROCEDURE — 85025 COMPLETE CBC W/AUTO DIFF WBC: CPT | Performed by: FAMILY MEDICINE

## 2022-08-30 LAB
.: NORMAL
BUN SERPL-MCNC: 20 MG/DL (ref 8–27)
BUN/CREATININE RATIO: 23 (ref 10–24)
CALCIUM SERPL-MCNC: 8.9 MG/DL (ref 8.6–10.2)
CHLORIDE SERPLBLD-SCNC: 104 MMOL/L (ref 96–106)
CREAT SERPL-MCNC: 0.88 MG/DL (ref 0.76–1.27)
EGFR: 90 ML/MIN/1.73
GLUCOSE SERPL-MCNC: 110 MG/DL (ref 65–99)
HBA1C MFR BLD: 6.2 % (ref 4.8–5.6)
POTASSIUM SERPL-SCNC: 3.9 MMOL/L (ref 3.5–5.2)
PSA NG/ML: <0.1 NG/ML (ref 0–4)
SODIUM SERPL-SCNC: 142 MMOL/L (ref 134–144)
TOTAL CO2: 23 MMOL/L (ref 20–29)

## 2022-09-01 NOTE — PROGRESS NOTES
"  SUBJECTIVE:   Daniel Samayoa is a 76 year old male who presents for Preventive Visit.      Patient has been advised of split billing requirements and indicates understanding: Yes  Are you in the first 12 months of your Medicare Part B coverage?  No    History of prostate cancer: s/p radical prostatectomy, PSA undetectable.      Prediabetes: stable, asymptomatic     HLD: doing well on statin    Physical Health:    In general, how would you rate your overall physical health? good    Outside of work, how many days during the week do you exercise? 4-5 days/week    Outside of work, approximately how many minutes a day do you exercise?45-60 minutes    If you drink alcohol do you typically have >3 drinks per day or >7 drinks per week? No    Do you usually eat at least 4 servings of fruit and vegetables a day, include whole grains & fiber and avoid regularly eating high fat or \"junk\" foods? Yes    Do you have any problems taking medications regularly?  No    Do you have any side effects from medications? none    Needs assistance for the following daily activities: no assistance needed    Which of the following safety concerns are present in your home?  none identified     Hearing impairment: No    In the past 6 months, have you been bothered by leaking of urine? no  HEARING FREQUENCY:   Right Ear:     500 Hz :  pass   1000 Hz:  pass   2000 Hz:  pass   4000 Hz:  pass  Left Ear:     500 Hz :  pass   1000 Hz:  pass   2000 Hz:  pass   4000 Hz:  pass  Cammie Cleveland CMA 9/2/2022  Mental Health:    In general, how would you rate your overall mental or emotional health? good  PHQ-2 Score:      Do you feel safe in your environment? Yes    Have you ever done Advance Care Planning? (For example, a Health Directive, POLST, or a discussion with a medical provider or your loved ones about your wishes): Yes, advance care planning is on file.    Additional concerns to address?  YES    Fall risk:  Fallen 2 or more times in the " past year?: No  Any fall with injury in the past year?: No    Cognitive Screenin) Repeat 3 items (Leader, Season, Table)    2) Clock draw: NORMAL  3) 3 item recall: Recalls 3 objects  Results: 3 items recalled: COGNITIVE IMPAIRMENT LESS LIKELY    Mini-CogTM Copyright S Jese. Licensed by the author for use in [facility]; reprinted with permission (rafa@[facility]). All rights reserved.      Do you have sleep apnea, excessive snoring or daytime drowsiness?: no        -------------------------------------    Reviewed and updated as needed this visit by clinical staff    Allergies  Meds                Reviewed and updated as needed this visit by Provider                   Social History     Tobacco Use     Smoking status: Never Smoker     Smokeless tobacco: Never Used   Substance Use Topics     Alcohol use: Yes     Alcohol/week: 1.7 standard drinks     Types: 2 Cans of beer per week     Comment: little                           Current providers sharing in care for this patient include:   Patient Care Team:  Hang Omalley MD as PCP - General (Family Practice)  Hang Omalley MD as Assigned PCP  Min Kirkland OD as Assigned Surgical Provider    The following health maintenance items are reviewed in Epic and correct as of today:  Health Maintenance   Topic Date Due     EYE EXAM  10/28/2022     MEDICARE ANNUAL WELLNESS VISIT  2023     FALL RISK ASSESSMENT  2023     COLORECTAL CANCER SCREENING  2023     DTAP/TDAP/TD IMMUNIZATION (3 - Td or Tdap) 2027     LIPID  2027     ADVANCE CARE PLANNING  2027     HEPATITIS C SCREENING  Completed     PHQ-2 (once per calendar year)  Completed     INFLUENZA VACCINE  Completed     Pneumococcal Vaccine: 65+ Years  Completed     ZOSTER IMMUNIZATION  Completed     AORTIC ANEURYSM SCREENING (SYSTEM ASSIGNED)  Completed     COVID-19 Vaccine  Completed     IPV IMMUNIZATION  Aged Out     MENINGITIS IMMUNIZATION  Aged  "Out     HEPATITIS B IMMUNIZATION  Aged Out     Labs reviewed in EPIC      ROS:  Constitutional, HEENT, cardiovascular, pulmonary, GI, , musculoskeletal, neuro, skin, endocrine and psych systems are negative, except as otherwise noted.    OBJECTIVE:   BP (!) 151/101   Pulse 54   Resp 16   Ht 1.753 m (5' 9\")   Wt 94.3 kg (207 lb 12.8 oz)   SpO2 96%   BMI 30.69 kg/m   Estimated body mass index is 30.69 kg/m  as calculated from the following:    Height as of this encounter: 1.753 m (5' 9\").    Weight as of this encounter: 94.3 kg (207 lb 12.8 oz).  EXAM:   GENERAL: healthy, alert and no distress  EYES: Eyes grossly normal to inspection, PERRL and conjunctivae and sclerae normal  HENT: ear canals and TM's normal, nose and mouth without ulcers or lesions  NECK: no adenopathy, no asymmetry, masses, or scars and thyroid normal to palpation  RESP: lungs clear to auscultation - no rales, rhonchi or wheezes  CV: regular rate and rhythm, normal S1 S2, no S3 or S4, no murmur, click or rub, no peripheral edema and peripheral pulses strong  ABDOMEN: soft, nontender, no hepatosplenomegaly, no masses and bowel sounds normal  MS: no gross musculoskeletal defects noted, no edema  SKIN: no suspicious lesions or rashes  NEURO: Normal strength and tone, mentation intact and speech normal  PSYCH: mentation appears normal, affect normal/bright    Diagnostic Test Results:  Labs reviewed in Epic    ASSESSMENT / PLAN:   Encounter for Medicare annual wellness exam  - discussed preventative guidelines, healthy diet, exercise and weight management    Needs flu shot  - FLUAD QUADRIVALENT 65+ ([facility] ONLY)  - ADMIN INFLUENZA VIRUS VAC    Trapezius strain, left, initial encounter  Mild, trial of HEP and massage.  Follow up if not improving    History of prostate cancer  PSA undectable    Prediabetes  Slightly worse this year.  Focus on lifestyle changes and will cont to monitor    Dyslipidemia  Cont statin    Elevated blood pressure " "reading without diagnosis of hypertension  Has been normal at home.  Will monitor more closely and follow up if elevated readings        Patient has been advised of split billing requirements and indicates understanding: Yes    COUNSELING:  Reviewed preventive health counseling, as reflected in patient instructions       Regular exercise       Healthy diet/nutrition    Estimated body mass index is 30.69 kg/m  as calculated from the following:    Height as of this encounter: 1.753 m (5' 9\").    Weight as of this encounter: 94.3 kg (207 lb 12.8 oz).    Weight management plan: Discussed healthy diet and exercise guidelines    He reports that he has never smoked. He has never used smokeless tobacco.    Appropriate preventive services were discussed with this patient, including applicable screening as appropriate for cardiovascular disease, diabetes, osteopenia/osteoporosis, and glaucoma.  As appropriate for age/gender, discussed screening for colorectal cancer, prostate cancer, breast cancer, and cervical cancer. Checklist reviewing preventive services available has been given to the patient.    Reviewed patients plan of care and provided an AVS. The Basic Care Plan (routine screening as documented in Health Maintenance) for Daniel meets the Care Plan requirement. This Care Plan has been established and reviewed with the Patient.    Counseling Resources:  ATP IV Guidelines  Pooled Cohorts Equation Calculator  Breast Cancer Risk Calculator  BRCA-Related Cancer Risk Assessment: FHS-7 Tool  FRAX Risk Assessment  ICSI Preventive Guidelines  Dietary Guidelines for Americans, 2010  USDA's MyPlate  ASA Prophylaxis  Lung CA Screening    Hang Omalley MD  [facility]  "

## 2022-09-01 NOTE — PATIENT INSTRUCTIONS
Patient Education   Personalized Prevention Plan  You are due for the preventive services outlined below.  Your care team is available to assist you in scheduling these services.  If you have already completed any of these items, please share that information with your care team to update in your medical record.  Health Maintenance Due   Topic Date Due    PHQ-2 (once per calendar year)  01/01/2022    Flu Vaccine (1) 09/01/2022    Annual Wellness Visit  09/01/2022    FALL RISK ASSESSMENT  09/01/2022

## 2022-09-02 ENCOUNTER — OFFICE VISIT (OUTPATIENT)
Dept: FAMILY MEDICINE | Facility: CLINIC | Age: 77
End: 2022-09-02

## 2022-09-02 VITALS
DIASTOLIC BLOOD PRESSURE: 101 MMHG | RESPIRATION RATE: 16 BRPM | SYSTOLIC BLOOD PRESSURE: 151 MMHG | HEIGHT: 69 IN | HEART RATE: 54 BPM | WEIGHT: 207.8 LBS | OXYGEN SATURATION: 96 % | BODY MASS INDEX: 30.78 KG/M2

## 2022-09-02 DIAGNOSIS — Z85.46 HISTORY OF PROSTATE CANCER: ICD-10-CM

## 2022-09-02 DIAGNOSIS — R73.03 PREDIABETES: ICD-10-CM

## 2022-09-02 DIAGNOSIS — E78.5 DYSLIPIDEMIA: ICD-10-CM

## 2022-09-02 DIAGNOSIS — S46.812A TRAPEZIUS STRAIN, LEFT, INITIAL ENCOUNTER: ICD-10-CM

## 2022-09-02 DIAGNOSIS — Z00.00 ENCOUNTER FOR MEDICARE ANNUAL WELLNESS EXAM: Primary | ICD-10-CM

## 2022-09-02 DIAGNOSIS — R03.0 ELEVATED BLOOD PRESSURE READING WITHOUT DIAGNOSIS OF HYPERTENSION: ICD-10-CM

## 2022-09-02 DIAGNOSIS — Z23 NEEDS FLU SHOT: ICD-10-CM

## 2022-09-02 PROBLEM — Z96.651 STATUS POST TOTAL RIGHT KNEE REPLACEMENT: Status: ACTIVE | Noted: 2021-06-24

## 2022-09-02 PROCEDURE — G0008 ADMIN INFLUENZA VIRUS VAC: HCPCS | Mod: 59 | Performed by: FAMILY MEDICINE

## 2022-09-02 PROCEDURE — 99214 OFFICE O/P EST MOD 30 MIN: CPT | Mod: 25 | Performed by: FAMILY MEDICINE

## 2022-09-02 PROCEDURE — G0439 PPPS, SUBSEQ VISIT: HCPCS | Performed by: FAMILY MEDICINE

## 2022-09-02 PROCEDURE — 90694 VACC AIIV4 NO PRSRV 0.5ML IM: CPT | Performed by: FAMILY MEDICINE

## 2022-09-02 RX ORDER — AMOXICILLIN 500 MG/1
CAPSULE ORAL
COMMUNITY
Start: 2022-04-05

## 2022-09-06 ENCOUNTER — TELEPHONE (OUTPATIENT)
Dept: FAMILY MEDICINE | Facility: CLINIC | Age: 77
End: 2022-09-06

## 2022-09-20 ENCOUNTER — LAB REQUISITION (OUTPATIENT)
Dept: LAB | Facility: CLINIC | Age: 77
End: 2022-09-20

## 2022-09-20 LAB
HBV SURFACE AB SERPL IA-ACNC: 0 M[IU]/ML
HBV SURFACE AB SERPL IA-ACNC: NONREACTIVE M[IU]/ML
HBV SURFACE AG SERPL QL IA: NONREACTIVE

## 2022-09-20 PROCEDURE — 86706 HEP B SURFACE ANTIBODY: CPT | Performed by: INTERNAL MEDICINE

## 2022-09-20 PROCEDURE — 87340 HEPATITIS B SURFACE AG IA: CPT | Performed by: INTERNAL MEDICINE

## 2022-09-20 PROCEDURE — 86481 TB AG RESPONSE T-CELL SUSP: CPT | Performed by: INTERNAL MEDICINE

## 2022-09-21 ENCOUNTER — OFFICE VISIT (OUTPATIENT)
Dept: FAMILY MEDICINE | Facility: CLINIC | Age: 77
End: 2022-09-21

## 2022-09-21 VITALS
DIASTOLIC BLOOD PRESSURE: 72 MMHG | BODY MASS INDEX: 30.75 KG/M2 | OXYGEN SATURATION: 97 % | RESPIRATION RATE: 16 BRPM | TEMPERATURE: 97.5 F | WEIGHT: 208.2 LBS | HEART RATE: 73 BPM | SYSTOLIC BLOOD PRESSURE: 126 MMHG

## 2022-09-21 DIAGNOSIS — R68.89 EAR SYMPTOM: Primary | ICD-10-CM

## 2022-09-21 DIAGNOSIS — H57.9 LEFT EYE SYMPTOMS: ICD-10-CM

## 2022-09-21 LAB
QUANTIFERON MITOGEN: 5.86 IU/ML
QUANTIFERON NIL TUBE: 1.2 IU/ML
QUANTIFERON TB1 TUBE: 1.13 IU/ML
QUANTIFERON TB2 TUBE: 1.15

## 2022-09-21 PROCEDURE — 99213 OFFICE O/P EST LOW 20 MIN: CPT | Performed by: FAMILY MEDICINE

## 2022-09-21 NOTE — PROGRESS NOTES
Jon Sanchez is a 76 year old patient who presents to clinic for evaluation.      Reports a few days of left ear feeling plugged and slight discomfort.  Had fungal infection in the past.  No drainage.  No significant hearing loss.      Also a couple weeks slight whitish accumulation intermittently on left eye.  Feels worse after sweating outside.  No vision loss, pain, photophobia.  Very slight redness.  Does not wear contacts.          Review of Systems   Constitutional, HEENT, cardiovascular, pulmonary, GI, , musculoskeletal, neuro, skin, endocrine and psych systems are negative, except as otherwise noted.      Objective    /72   Pulse 73   Temp 97.5  F (36.4  C)   Resp 16   Wt 94.4 kg (208 lb 3.2 oz)   SpO2 97%   BMI 30.75 kg/m      General: Well appearing, NAD  HEENT: very slight scleral injection.  Otherwise clear and normal eye exam.  Left ear canal narrow but without abnormality.    Psych: normal mood and affect        Ear symptom  Possibly ETD.  He will attempt pressure equalization maneuvers and can try his leftover topical antifungal.  If not improved advised ENT follow up    Left eye symptoms  Uncertain cause.  Reassuring hx and exam.  Trial of lubricating drops.  If not improved consider trial of abx drops and eye referral.           not ready

## 2022-09-22 LAB
GAMMA INTERFERON BACKGROUND BLD IA-ACNC: 1.2 IU/ML
M TB IFN-G BLD-IMP: NEGATIVE
M TB IFN-G CD4+ BCKGRND COR BLD-ACNC: 4.66 IU/ML
MITOGEN IGNF BCKGRD COR BLD-ACNC: -0.05 IU/ML
MITOGEN IGNF BCKGRD COR BLD-ACNC: -0.07 IU/ML

## 2022-10-06 DIAGNOSIS — E78.5 DYSLIPIDEMIA: ICD-10-CM

## 2022-10-07 RX ORDER — ROSUVASTATIN CALCIUM 5 MG/1
TABLET, COATED ORAL
Qty: 90 TABLET | Refills: 3 | Status: SHIPPED | OUTPATIENT
Start: 2022-10-07 | End: 2023-08-07

## 2022-10-07 NOTE — TELEPHONE ENCOUNTER
Rosuvastatin  LOV 9/2/22  Dyslipidemia  Cont statin  Component      Latest Ref Rng & Units 8/29/2022   Glucose      65 - 99 mg/dL 110 (H)   Urea Nitrogen      8 - 27 mg/dL 20   Creatinine      0.76 - 1.27 mg/dL 0.88   eGFR       >59 mL/min/1.73 90   BUN/Creatinine Ratio      10 - 24 23   Sodium      134 - 144 mmol/L 142   Potassium      3.5 - 5.2 mmol/L 3.9   Chloride      96 - 106 mmol/L 104   Total CO2      20 - 29 mmol/L 23   Calcium      8.6 - 10.2 mg/dL 8.9   CHOLESTEROL      100 - 199 mg/dl 128   HDL      40 mg/dl 51   TRIGLYCERIDES ([facility])      0 - 149 mg/dl 48   LDL CALCULATED ([facility])      0 - 130 mg/dl 68   CHOL/HDL RATIO ([facility])      0.0 - 4.5 mg/dl 2.5

## 2022-10-31 ENCOUNTER — OFFICE VISIT (OUTPATIENT)
Dept: OPTOMETRY | Facility: CLINIC | Age: 77
End: 2022-10-31
Payer: COMMERCIAL

## 2022-10-31 DIAGNOSIS — Z01.01 ENCOUNTER FOR EXAMINATION OF EYES AND VISION WITH ABNORMAL FINDINGS: Primary | ICD-10-CM

## 2022-10-31 DIAGNOSIS — H52.221 REGULAR ASTIGMATISM OF RIGHT EYE: ICD-10-CM

## 2022-10-31 DIAGNOSIS — H10.402 CHRONIC CONJUNCTIVITIS OF LEFT EYE, UNSPECIFIED CHRONIC CONJUNCTIVITIS TYPE: ICD-10-CM

## 2022-10-31 DIAGNOSIS — H52.13 MYOPIA OF BOTH EYES: ICD-10-CM

## 2022-10-31 DIAGNOSIS — Z98.890 S/P LASIK SURGERY: ICD-10-CM

## 2022-10-31 DIAGNOSIS — H25.13 NUCLEAR AGE-RELATED CATARACT, BOTH EYES: ICD-10-CM

## 2022-10-31 DIAGNOSIS — H52.4 PRESBYOPIA: ICD-10-CM

## 2022-10-31 DIAGNOSIS — H43.812 PVD (POSTERIOR VITREOUS DETACHMENT), LEFT: ICD-10-CM

## 2022-10-31 PROCEDURE — 92014 COMPRE OPH EXAM EST PT 1/>: CPT | Performed by: OPTOMETRIST

## 2022-10-31 PROCEDURE — 92015 DETERMINE REFRACTIVE STATE: CPT | Performed by: OPTOMETRIST

## 2022-10-31 RX ORDER — POLYMYXIN B SULFATE AND TRIMETHOPRIM 1; 10000 MG/ML; [USP'U]/ML
1 SOLUTION OPHTHALMIC 3 TIMES DAILY
Qty: 10 ML | Refills: 0 | Status: SHIPPED | OUTPATIENT
Start: 2022-10-31 | End: 2023-08-16

## 2022-10-31 ASSESSMENT — REFRACTION_WEARINGRX
OD_AXIS: 017
OS_CYLINDER: SPHERE
OS_ADD: +2.50
OD_SPHERE: -2.50
OD_CYLINDER: +1.50
OS_CYLINDER: SPHERE
OS_SPHERE: +2.00
SPECS_TYPE: READING
OD_AXIS: 014
OD_SPHERE: PLANO
OD_CYLINDER: +1.25
OD_ADD: +2.50
SPECS_TYPE: PAL
OS_SPHERE: -0.25

## 2022-10-31 ASSESSMENT — CUP TO DISC RATIO
OD_RATIO: 0.25
OS_RATIO: 0.25

## 2022-10-31 ASSESSMENT — VISUAL ACUITY
OS_SC: 20/25
CORRECTION_TYPE: GLASSES
METHOD: SNELLEN - LINEAR
OS_CC+: +2
OS_CC: 20/30-1
OD_CC: 20/40
OD_SC: 20/100
OS_SC: 20/60
OD_SC: 20/30-2
OS_CC: 20/30
OD_CC: 20/20
OS_SC+: -2

## 2022-10-31 ASSESSMENT — REFRACTION_MANIFEST
OD_SPHERE: -3.00
OD_CYLINDER: +1.25
OD_ADD: +2.50
OS_ADD: +2.50
OS_SPHERE: -0.50
OS_CYLINDER: SPHERE
OD_AXIS: 017

## 2022-10-31 ASSESSMENT — SLIT LAMP EXAM - LIDS
COMMENTS: NORMAL
COMMENTS: NORMAL

## 2022-10-31 ASSESSMENT — TONOMETRY
OS_IOP_MMHG: 16
OD_IOP_MMHG: 16
IOP_METHOD: APPLANATION

## 2022-10-31 ASSESSMENT — CONF VISUAL FIELD
OD_SUPERIOR_TEMPORAL_RESTRICTION: 0
OS_NORMAL: 1
OS_INFERIOR_TEMPORAL_RESTRICTION: 0
OD_INFERIOR_NASAL_RESTRICTION: 0
OD_SUPERIOR_NASAL_RESTRICTION: 0
OD_INFERIOR_TEMPORAL_RESTRICTION: 0
OS_SUPERIOR_TEMPORAL_RESTRICTION: 0
OS_INFERIOR_NASAL_RESTRICTION: 0
METHOD: COUNTING FINGERS
OS_SUPERIOR_NASAL_RESTRICTION: 0
OD_NORMAL: 1

## 2022-10-31 ASSESSMENT — EXTERNAL EXAM - LEFT EYE: OS_EXAM: NORMAL

## 2022-10-31 ASSESSMENT — EXTERNAL EXAM - RIGHT EYE: OD_EXAM: NORMAL

## 2022-10-31 NOTE — LETTER
10/31/2022         RE: Daniel Samayoa  [address]  [facility] [address]        Dear Colleague,    Thank you for referring your patient, Daniel Samayoa, to the [facility]. Please see a copy of my visit note below.    Chief Complaint   Patient presents with     Eye Pain     Diabetic Eye Exam        Chief Complaint(s) and History of Present Illness(es)     Eye Pain            Laterality: In left eye    Timing: in the morning    Duration: 4 weeks    Associated symptoms: discharge and tearing    Response to treatment: mild improvement          Diabetic Eye Exam            Associated symptoms: discharge and tearing    Diabetes Type: controlled with diet (Pre-diabetic)    Blood Sugars: is controlled (Checks regularly)               Lab Results   Component Value Date    A1C 6.2 08/29/2022    A1C 5.8 06/15/2021    A1C 5.8 08/24/2020    A1C 5.9 08/22/2019    A1C 5.9 05/03/2018     -~4 weeks ago, he noticed some irritation in left eye - mucousy white discharge in AM as well as some minor discomfort. PCP recommended some eyewash which he used a few times per day. Symptoms are improved    -Cataracts - no sx yet    Last Eye Exam: 10/28/2021  Dilated Previously: Yes, side effects of dilation explained today    What are you currently using to see?  Glasses - 2 pair - PAL's and SVL Reading -mainly uses the PAL's    Distance Vision Acuity: More trouble with glare at night - driving at night is becoming more difficult    Near Vision Acuity: Satisfied with vision while reading and using computer with glasses - very fine print is tougher - uses a magnifying glass     Eye Comfort: good - only issue is with left eye - see above  Do you use eye drops? 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       OBJECTIVE: See Ophthalmology  "exam    ASSESSMENT:    ICD-10-CM    1. Encounter for examination of eyes and vision with abnormal findings  Z01.01       2. Nuclear age-related cataract, both eyes  H25.13       3. PVD (posterior vitreous detachment), left  H43.812       4. Chronic conjunctivitis of left eye, unspecified chronic conjunctivitis type  H10.402       5. S/P LASIK surgery - Left Eye  Z98.890       6. Myopia of both eyes  H52.13       7. Regular astigmatism of right eye  H52.221       8. Presbyopia  H52.4           PLAN:    Daniel Samayoa aware  eye exam results will be sent to Hang Omalley.  Patient Instructions   Possible lingering conjunctivitis left eye.   Begin Polytrim eyedrops 3x daily in the left eye for 5-7 days.     You have the start of mild cataracts.  You may notice some blurred vision or glare with night driving.  It is important that you wear good sunglasses to protect your eyes from the ultraviolet light from the sun.     You have a PVD- posterior vitreous detachment which is due to the gel of the eye shrinking and clumping together.  This can sometimes cause holes or tears in the retina.  The signs of a retinal detachment are flashes of light or a \"curtain veil\" coming over your vision. If you notice any of these changes return to clinic for re-evaluation.     Updated glasses prescription provided today.   Allow 2 weeks to adapt to the new glasses.     Return in 1 year for a comprehensive eye exam, or sooner if needed.      The effects of the dilating drops last for 4- 6 hours.  You will be more sensitive to light and vision will be blurry up close.  Mydriatic sunglasses were given if needed.     Min Kirkland, LETICIA  [facility]  [address] [place] [address]. [place]  [address]    [phone]              Again, thank you for allowing me to participate in the care of your patient.        Sincerely,        Min Kirkland, OD    "

## 2022-10-31 NOTE — PATIENT INSTRUCTIONS
"Possible lingering conjunctivitis left eye.   Begin Polytrim eyedrops 3x daily in the left eye for 5-7 days.     You have the start of mild cataracts.  You may notice some blurred vision or glare with night driving.  It is important that you wear good sunglasses to protect your eyes from the ultraviolet light from the sun.     You have a PVD- posterior vitreous detachment which is due to the gel of the eye shrinking and clumping together.  This can sometimes cause holes or tears in the retina.  The signs of a retinal detachment are flashes of light or a \"curtain veil\" coming over your vision. If you notice any of these changes return to clinic for re-evaluation.     Updated glasses prescription provided today.   Allow 2 weeks to adapt to the new glasses.     Return in 1 year for a comprehensive eye exam, or sooner if needed.      The effects of the dilating drops last for 4- 6 hours.  You will be more sensitive to light and vision will be blurry up close.  Mydriatic sunglasses were given if needed.     Min Kirkland, OD  [address] [place]  [address]    [phone]   "

## 2022-10-31 NOTE — PROGRESS NOTES
Chief Complaint   Patient presents with     Eye Pain     Diabetic Eye Exam        Chief Complaint(s) and History of Present Illness(es)     Eye Pain            Laterality: In left eye    Timing: in the morning    Duration: 4 weeks    Associated symptoms: discharge and tearing    Response to treatment: mild improvement          Diabetic Eye Exam            Associated symptoms: discharge and tearing    Diabetes Type: controlled with diet (Pre-diabetic)    Blood Sugars: is controlled (Checks regularly)               Lab Results   Component Value Date    A1C 6.2 08/29/2022    A1C 5.8 06/15/2021    A1C 5.8 08/24/2020    A1C 5.9 08/22/2019    A1C 5.9 05/03/2018     -~4 weeks ago, he noticed some irritation in left eye - mucousy white discharge in AM as well as some minor discomfort. PCP recommended some eyewash which he used a few times per day. Symptoms are improved    -Cataracts - no sx yet    Last Eye Exam: 10/28/2021  Dilated Previously: Yes, side effects of dilation explained today    What are you currently using to see?  Glasses - 2 pair - PAL's and SVL Reading -mainly uses the PAL's    Distance Vision Acuity: More trouble with glare at night - driving at night is becoming more difficult    Near Vision Acuity: Satisfied with vision while reading and using computer with glasses - very fine print is tougher - uses a magnifying glass     Eye Comfort: good - only issue is with left eye - see above  Do you use eye drops? 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       OBJECTIVE: See Ophthalmology exam    ASSESSMENT:    ICD-10-CM    1. Encounter for examination of eyes and vision with abnormal findings  Z01.01       2. Nuclear age-related cataract, both eyes  H25.13       3. PVD (posterior vitreous detachment), left  H43.812       4. Chronic conjunctivitis  "of left eye, unspecified chronic conjunctivitis type  H10.402       5. S/P LASIK surgery - Left Eye  Z98.890       6. Myopia of both eyes  H52.13       7. Regular astigmatism of right eye  H52.221       8. Presbyopia  H52.4           PLAN:    Daniel Samayoa aware  eye exam results will be sent to Hang Omalley.  Patient Instructions   Possible lingering conjunctivitis left eye.   Begin Polytrim eyedrops 3x daily in the left eye for 5-7 days.     You have the start of mild cataracts.  You may notice some blurred vision or glare with night driving.  It is important that you wear good sunglasses to protect your eyes from the ultraviolet light from the sun.     You have a PVD- posterior vitreous detachment which is due to the gel of the eye shrinking and clumping together.  This can sometimes cause holes or tears in the retina.  The signs of a retinal detachment are flashes of light or a \"curtain veil\" coming over your vision. If you notice any of these changes return to clinic for re-evaluation.     Updated glasses prescription provided today.   Allow 2 weeks to adapt to the new glasses.     Return in 1 year for a comprehensive eye exam, or sooner if needed.      The effects of the dilating drops last for 4- 6 hours.  You will be more sensitive to light and vision will be blurry up close.  Mydriatic sunglasses were given if needed.     Min Kirkland, OD  [address] [place]  [address]    [phone]          "

## 2022-11-13 NOTE — PROGRESS NOTES
"I am seeing this patient in consultation for otalgia, bilateral at the request of the provider Dr. Davin Kimble.      Chief Complaint - ear itching    History of Present Illness - Daniel Samayoa is a 77 year old male who returns to me today with itching in left ear. I saw him in 2019 and he had bilateral fungal otitis externa I treated with lotrimin. He said that helped. Today it doesn't feel infected, but the ear isn't \"comfortable.\" It has been present and noticeable for approximately a couple months. Some hearing plugging.     Past Medical History -   Patient Active Problem List   Diagnosis     Dyslipidemia     Health Care Home     Cataracts, both eyes     PVD (posterior vitreous detachment), both eyes     Other male erectile dysfunction     History of prostate cancer     Prediabetes     S/P prostatectomy     Primary osteoarthritis of both knees     Status post total right knee replacement       Current Medications -   Current Outpatient Medications:      rosuvastatin (CRESTOR) 5 MG tablet, TAKE 1 TABLET BY MOUTH EVERYDAY AT BEDTIME, Disp: 90 tablet, Rfl: 3     amoxicillin (AMOXIL) 500 MG capsule, TAKE 4 CAPSULES BY MOUTH ONE HOUR PRIOR TO APPT, Disp: , Rfl:      Ascorbic Acid (C 1000 PO), Take by mouth every other day , Disp: , Rfl:      aspirin 81 MG EC tablet, Take 81 mg by mouth daily., Disp: , Rfl:      B Complex Vitamins (B COMPLEX 50 PO), Take  by mouth., Disp: , Rfl:      calcium carbonate (TUMS) 500 MG chewable tablet, Take 2 chew tab by mouth daily, Disp: , Rfl:      Calcium-Vitamin D (CALCIUM 500 +D) 500-125 MG-UNIT TABS, Take  by mouth daily., Disp: , Rfl:      fish oil-omega-3 fatty acids 1000 MG capsule, Take 2 g by mouth daily., Disp: , Rfl:      ibuprofen (ADVIL/MOTRIN) 200 MG capsule, Take 200 mg by mouth every 4 hours as needed., Disp: , Rfl:      Multiple Vitamins-Minerals (ZINC PO), Take 1 tablet by mouth, Disp: , Rfl:      omeprazole (PRILOSEC) 20 MG DR capsule, TAKE 1 CAPSULE BY MOUTH " EVERY DAY, Disp: 90 capsule, Rfl: 3     Pyridoxine HCl (VITAMIN B-6 PO), Take 100 mg by mouth, Disp: , Rfl:      sildenafil (REVATIO) 20 MG tablet, TAKE UP TO THREE TABLETS AS NEEDED FOR SEXUAL ACTIVITY.  Never use with nitroglycerin, terazosin or doxazosin., Disp: 90 tablet, Rfl: 3     trimethoprim-polymyxin b (POLYTRIM) 79593-9.1 UNIT/ML-% ophthalmic solution, Place 1 drop Into the left eye 3 times daily, Disp: 10 mL, Rfl: 0     Vitamin A 7.5 MG (74204 UT) CAPS, , Disp: , Rfl:     Allergies -   Allergies   Allergen Reactions     Simvastatin Other (See Comments)     Per paper chart medical records -- patient tried zocor in 7/2004 and had myalgias so discontinued.  3/2014 tried again -- myalgias again.       Social History -   Social History     Socioeconomic History     Marital status:      Spouse name: None     Number of children: None     Years of education: None     Highest education level: None   Occupational History     None   Social Needs     Financial resource strain: None     Food insecurity:     Worry: None     Inability: None     Transportation needs:     Medical: None     Non-medical: None   Tobacco Use     Smoking status: Never Smoker     Smokeless tobacco: Never Used   Substance and Sexual Activity     Alcohol use: Yes     Alcohol/week: 1.7 standard drinks     Types: 2 Cans of beer per week     Comment: little     Drug use: No     Sexual activity: None   Lifestyle     Physical activity:     Days per week: None     Minutes per session: None     Stress: None   Relationships     Social connections:     Talks on phone: None     Gets together: None     Attends Sikh service: None     Active member of club or organization: None     Attends meetings of clubs or organizations: None     Relationship status: None     Intimate partner violence:     Fear of current or ex partner: None     Emotionally abused: None     Physically abused: None     Forced sexual activity: None   Other Topics Concern      Parent/sibling w/ CABG, MI or angioplasty before 65F 55M? Not Asked   Social History Narrative     None       Family History -   Family History   Problem Relation Age of Onset     Hypertension Mother      Diabetes Mother      Lung Cancer Father      Rectal Cancer Sister      Pancreatic Cancer Brother      Pancreatic Cancer Brother      Glaucoma No family hx of      Macular Degeneration No family hx of        Review of Systems - As per HPI and PMHx, otherwise 7 system review of the head and neck negative.    Physical Exam  General - The patient is in no distress.  Alert and oriented to person and place, answers questions and cooperates with examination appropriately.   Voice and Breathing - The patient was breathing comfortably without the use of accessory muscles. There was no wheezing, stridor, or stertor.  The patients voice was clear and strong.  Ears - Both ears were impacted with cerumen. No acute infection. See below.   Eyes - Extraocular movements intact.  Sclera were not icteric or injected.  Neck - soft, nontender, palpation of the occipital, submental, submandibular, internal jugular chain, and supraclavicular nodes did not demonstrateany abnormal lymph nodes or masses. No parotid masses. Palpation of the thyroid was soft and smooth, with no nodules or goiter appreciated.  The trachea was mobile and midline.  Neurological - Cranial nerves 2 through 12 were grossly intact. House-Brackmann grade 1 out of 6 bilaterally.      Physical Exam and Procedure  Ears - On examination of the ears, I found that both ears were impacted with cerumen.  Therefore, I positioned the patient in the examination chair in a semi-supine position. I used the binocular surgical microscope to perform cerumen removal.  On the right side, I began by using a cerumen loop to gently lift the edges of the cerumen mass away from the walls of the external canal.  Once I did this, I was able to pull away fragments of wax and debris. I removed  all the wax and debri.  The tympanic membrane was intact, no sign of perforation or middle ear effusion.    I turned my attention to the left side once again using the binocular surgical microscope to perform cerumen removal.  I began by using a cerumen loop to gently lift the edges of the cerumen mass away from the walls of the external canal.  Once I did this, I was able to pull away fragments of wax and debris. I removed all the wax and debri. The lateral canal was edematous and somewhat erythematous. No acute infection, looks like eczema. The tympanic membrane was intact, no sign of perforation or middle ear effusion.      Assessment and Plan - Daniel Samayoa is a 77 year old male who has a left chronic eczematous otitis externa. I have seen him in the past for  bilateral otitis externa, likely yeast. I debrided the canals under the microscope. I will prescribe lotrimin and have the patient return 2 weeks. I discussed keeping the ear dry, and to not place anything in the ear except for the ear drops.     Srini Felipe MD  Otolaryngology  [facility]

## 2022-11-15 ENCOUNTER — OFFICE VISIT (OUTPATIENT)
Dept: OTOLARYNGOLOGY | Facility: CLINIC | Age: 77
End: 2022-11-15
Payer: COMMERCIAL

## 2022-11-15 VITALS
WEIGHT: 208 LBS | DIASTOLIC BLOOD PRESSURE: 72 MMHG | BODY MASS INDEX: 30.72 KG/M2 | HEART RATE: 75 BPM | SYSTOLIC BLOOD PRESSURE: 130 MMHG

## 2022-11-15 DIAGNOSIS — H60.8X2 CHRONIC ECZEMATOUS OTITIS EXTERNA OF LEFT EAR: Primary | ICD-10-CM

## 2022-11-15 DIAGNOSIS — H61.23 BILATERAL IMPACTED CERUMEN: ICD-10-CM

## 2022-11-15 PROCEDURE — 99203 OFFICE O/P NEW LOW 30 MIN: CPT | Mod: 25 | Performed by: OTOLARYNGOLOGY

## 2022-11-15 PROCEDURE — 69210 REMOVE IMPACTED EAR WAX UNI: CPT | Performed by: OTOLARYNGOLOGY

## 2022-11-15 RX ORDER — FLUOCINOLONE ACETONIDE 0.11 MG/ML
OIL AURICULAR (OTIC)
Qty: 20 ML | Refills: 1 | Status: SHIPPED | OUTPATIENT
Start: 2022-11-15 | End: 2023-08-16

## 2022-11-15 NOTE — LETTER
"    11/15/2022         RE: Daniel Samayoa  [address]  [facility] [address]        Dear Colleague,    Thank you for referring your patient, Daniel Samayoa, to the [facility]. Please see a copy of my visit note below.    I am seeing this patient in consultation for otalgia, bilateral at the request of the provider Dr. Davin Kimble.      Chief Complaint - ear itching    History of Present Illness - Daniel Samayoa is a 77 year old male who returns to me today with itching in left ear. I saw him in 2019 and he had bilateral fungal otitis externa I treated with lotrimin. He said that helped. Today it doesn't feel infected, but the ear isn't \"comfortable.\" It has been present and noticeable for approximately a couple months. Some hearing plugging.     Past Medical History -   Patient Active Problem List   Diagnosis     Dyslipidemia     Health Care Home     Cataracts, both eyes     PVD (posterior vitreous detachment), both eyes     Other male erectile dysfunction     History of prostate cancer     Prediabetes     S/P prostatectomy     Primary osteoarthritis of both knees     Status post total right knee replacement       Current Medications -   Current Outpatient Medications:      rosuvastatin (CRESTOR) 5 MG tablet, TAKE 1 TABLET BY MOUTH EVERYDAY AT BEDTIME, Disp: 90 tablet, Rfl: 3     amoxicillin (AMOXIL) 500 MG capsule, TAKE 4 CAPSULES BY MOUTH ONE HOUR PRIOR TO APPT, Disp: , Rfl:      Ascorbic Acid (C 1000 PO), Take by mouth every other day , Disp: , Rfl:      aspirin 81 MG EC tablet, Take 81 mg by mouth daily., Disp: , Rfl:      B Complex Vitamins (B COMPLEX 50 PO), Take  by mouth., Disp: , Rfl:      calcium carbonate (TUMS) 500 MG chewable tablet, Take 2 chew tab by mouth daily, Disp: , Rfl:      Calcium-Vitamin D (CALCIUM 500 +D) 500-125 MG-UNIT TABS, Take  by mouth daily., Disp: , Rfl:      fish oil-omega-3 fatty acids 1000 MG capsule, Take 2 g by mouth daily., Disp: , Rfl:      " ibuprofen (ADVIL/MOTRIN) 200 MG capsule, Take 200 mg by mouth every 4 hours as needed., Disp: , Rfl:      Multiple Vitamins-Minerals (ZINC PO), Take 1 tablet by mouth, Disp: , Rfl:      omeprazole (PRILOSEC) 20 MG DR capsule, TAKE 1 CAPSULE BY MOUTH EVERY DAY, Disp: 90 capsule, Rfl: 3     Pyridoxine HCl (VITAMIN B-6 PO), Take 100 mg by mouth, Disp: , Rfl:      sildenafil (REVATIO) 20 MG tablet, TAKE UP TO THREE TABLETS AS NEEDED FOR SEXUAL ACTIVITY.  Never use with nitroglycerin, terazosin or doxazosin., Disp: 90 tablet, Rfl: 3     trimethoprim-polymyxin b (POLYTRIM) 73343-6.1 UNIT/ML-% ophthalmic solution, Place 1 drop Into the left eye 3 times daily, Disp: 10 mL, Rfl: 0     Vitamin A 7.5 MG (63540 UT) CAPS, , Disp: , Rfl:     Allergies -   Allergies   Allergen Reactions     Simvastatin Other (See Comments)     Per paper chart medical records -- patient tried zocor in 7/2004 and had myalgias so discontinued.  3/2014 tried again -- myalgias again.       Social History -   Social History     Socioeconomic History     Marital status:      Spouse name: None     Number of children: None     Years of education: None     Highest education level: None   Occupational History     None   Social Needs     Financial resource strain: None     Food insecurity:     Worry: None     Inability: None     Transportation needs:     Medical: None     Non-medical: None   Tobacco Use     Smoking status: Never Smoker     Smokeless tobacco: Never Used   Substance and Sexual Activity     Alcohol use: Yes     Alcohol/week: 1.7 standard drinks     Types: 2 Cans of beer per week     Comment: little     Drug use: No     Sexual activity: None   Lifestyle     Physical activity:     Days per week: None     Minutes per session: None     Stress: None   Relationships     Social connections:     Talks on phone: None     Gets together: None     Attends [employer] service: None     Active member of club or organization: None     Attends meetings of  clubs or organizations: None     Relationship status: None     Intimate partner violence:     Fear of current or ex partner: None     Emotionally abused: None     Physically abused: None     Forced sexual activity: None   Other Topics Concern     Parent/sibling w/ CABG, MI or angioplasty before 65F 55M? Not Asked   Social History Narrative     None       Family History -   Family History   Problem Relation Age of Onset     Hypertension Mother      Diabetes Mother      Lung Cancer Father      Rectal Cancer Sister      Pancreatic Cancer Brother      Pancreatic Cancer Brother      Glaucoma No family hx of      Macular Degeneration No family hx of        Review of Systems - As per HPI and PMHx, otherwise 7 system review of the head and neck negative.    Physical Exam  General - The patient is in no distress.  Alert and oriented to person and place, answers questions and cooperates with examination appropriately.   Voice and Breathing - The patient was breathing comfortably without the use of accessory muscles. There was no wheezing, stridor, or stertor.  The patients voice was clear and strong.  Ears - Both ears were impacted with cerumen. No acute infection. See below.   Eyes - Extraocular movements intact.  Sclera were not icteric or injected.  Neck - soft, nontender, palpation of the occipital, submental, submandibular, internal jugular chain, and supraclavicular nodes did not demonstrateany abnormal lymph nodes or masses. No parotid masses. Palpation of the thyroid was soft and smooth, with no nodules or goiter appreciated.  The trachea was mobile and midline.  Neurological - Cranial nerves 2 through 12 were grossly intact. House-Brackmann grade 1 out of 6 bilaterally.      Physical Exam and Procedure  Ears - On examination of the ears, I found that both ears were impacted with cerumen.  Therefore, I positioned the patient in the examination chair in a semi-supine position. I used the binocular surgical microscope  to perform cerumen removal.  On the right side, I began by using a cerumen loop to gently lift the edges of the cerumen mass away from the walls of the external canal.  Once I did this, I was able to pull away fragments of wax and debris. I removed all the wax and debri.  The tympanic membrane was intact, no sign of perforation or middle ear effusion.    I turned my attention to the left side once again using the binocular surgical microscope to perform cerumen removal.  I began by using a cerumen loop to gently lift the edges of the cerumen mass away from the walls of the external canal.  Once I did this, I was able to pull away fragments of wax and debris. I removed all the wax and debri. The lateral canal was edematous and somewhat erythematous. No acute infection, looks like eczema. The tympanic membrane was intact, no sign of perforation or middle ear effusion.      Assessment and Plan - Daniel Samayoa is a 77 year old male who has a left chronic eczematous otitis externa. I have seen him in the past for  bilateral otitis externa, likely yeast. I debrided the canals under the microscope. I will prescribe lotrimin and have the patient return 2 weeks. I discussed keeping the ear dry, and to not place anything in the ear except for the ear drops.     Srini Felipe MD  Otolaryngology  [facility]        Again, thank you for allowing me to participate in the care of your patient.        Sincerely,        Srini Felipe MD

## 2022-11-16 ENCOUNTER — TELEPHONE (OUTPATIENT)
Dept: OTOLARYNGOLOGY | Facility: CLINIC | Age: 77
End: 2022-11-16

## 2022-11-16 NOTE — TELEPHONE ENCOUNTER
[facility] Call Center    Phone Message    May a detailed message be left on voicemail: yes     Reason for Call: Medication Question or concern regarding medication - Patient is wondering if you could send the prescription directly to him via email:    [email or web address]    Or is there another type of medication he can use that is cheaper.  Thank you!           Action Taken: Message routed to:  Other: [facility]    Travel Screening: Not Applicable

## 2022-11-16 NOTE — TELEPHONE ENCOUNTER
Returned call to pt who was able to use [facility] for the erx. He was pleased.      Ciarra DEAN RN Specialty Triage 11/16/2022 4:04 PM

## 2022-11-16 NOTE — TELEPHONE ENCOUNTER
" Health Call Center    Phone Message    May a detailed message be left on voicemail: yes     Reason for Call: Medication Refill Request    Has the patient contacted the pharmacy for the refill? Yes   Name of medication being requested: fluocinolone acetonide   Provider who prescribed the medication: Dr Felipe  Pharmacy: [facility] [place]   Date medication is needed: ASAP        Pt states that medication is too expensive and was wondering if an generic brand or something \"least\" expensive can be Rx . Please reach out to pt with any additional questions/information . Thank you     Action Taken: Message routed to:  Other: [facility]     Travel Screening: Not Applicable                                                                      "

## 2022-11-16 NOTE — TELEPHONE ENCOUNTER
Returned call and left pt vm [facility] has coupons for about 30 dollars. Left call back number    Ciarra DEAN, RN Specialty Triage 11/16/2022 1:56 PM

## 2023-01-09 ENCOUNTER — TRANSFERRED RECORDS (OUTPATIENT)
Dept: FAMILY MEDICINE | Facility: CLINIC | Age: 78
End: 2023-01-09

## 2023-02-01 ENCOUNTER — TRANSFERRED RECORDS (OUTPATIENT)
Dept: FAMILY MEDICINE | Facility: CLINIC | Age: 78
End: 2023-02-01

## 2023-02-17 DIAGNOSIS — K21.9 GASTROESOPHAGEAL REFLUX DISEASE WITHOUT ESOPHAGITIS: ICD-10-CM

## 2023-03-28 ENCOUNTER — TRANSFERRED RECORDS (OUTPATIENT)
Dept: FAMILY MEDICINE | Facility: CLINIC | Age: 78
End: 2023-03-28

## 2023-04-03 ENCOUNTER — OFFICE VISIT (OUTPATIENT)
Dept: FAMILY MEDICINE | Facility: CLINIC | Age: 78
End: 2023-04-03

## 2023-04-03 VITALS
DIASTOLIC BLOOD PRESSURE: 96 MMHG | HEIGHT: 69 IN | HEART RATE: 52 BPM | OXYGEN SATURATION: 98 % | TEMPERATURE: 97.9 F | SYSTOLIC BLOOD PRESSURE: 172 MMHG | BODY MASS INDEX: 30.9 KG/M2 | WEIGHT: 208.6 LBS

## 2023-04-03 DIAGNOSIS — R51.9 ACUTE INTRACTABLE HEADACHE, UNSPECIFIED HEADACHE TYPE: ICD-10-CM

## 2023-04-03 DIAGNOSIS — I10 PRIMARY HYPERTENSION: Primary | ICD-10-CM

## 2023-04-03 PROCEDURE — 93000 ELECTROCARDIOGRAM COMPLETE: CPT | Performed by: FAMILY MEDICINE

## 2023-04-03 PROCEDURE — 36415 COLL VENOUS BLD VENIPUNCTURE: CPT | Performed by: FAMILY MEDICINE

## 2023-04-03 PROCEDURE — 99214 OFFICE O/P EST MOD 30 MIN: CPT | Performed by: FAMILY MEDICINE

## 2023-04-03 RX ORDER — OLMESARTAN MEDOXOMIL 5 MG/1
5 TABLET ORAL DAILY
Qty: 90 TABLET | Refills: 0 | Status: SHIPPED | OUTPATIENT
Start: 2023-04-03 | End: 2023-08-16

## 2023-04-03 NOTE — PROGRESS NOTES
"6}    Jon Sanchez is a 77 year old patient who presents to clinic for evaluation.  He has a knee surgery scheduled soon to arthroscopically remove scar tissue.  However, for the past 4 days has had a headache.  Feels \"tight\" around head.  Not severe and not the worst headache of his life but has persisted.  He noted quite elevated blood pressures which he has not had previously.  He is not on BP medication.  Has not been ill.  Denies med changes, diet changes or other new things that could explain this.  /87 at home.        Review of Systems         Objective    BP (!) 163/108   Pulse 52   Temp 97.9  F (36.6  C)   Ht 1.753 m (5' 9\")   Wt 94.6 kg (208 lb 9.6 oz)   SpO2 98%   BMI 30.80 kg/m      General: Well appearing, NAD  HEENT: PERRL.  No fundoscopic abnormalities seen though exam somewhat limited  Heart: RRR, no murmur  Chest: Lungs CTAB  Skin: Clear  Neuro: CN 2-12 intact.  No focal deficits  Psych: normal mood and affect        EKG - Reviewed and interpreted by me appears normal, NSR, normal axis, normal intervals, no acute ST/T changes c/w ischemia, no LVH by voltage criteria, unchanged from previous tracings    Primary hypertension  Uncertain what triggered this.  Given symptoms and upcoming surgery he elects trial of medication.  Will monitor at home and recheck here in 2 weeks.  Will delay surgery.  uptitrate olmesartan in 1 week based on home readings if needed  - olmesartan (BENICAR) 5 MG tablet; Take 1 tablet (5 mg) by mouth daily  - EKG 12-lead complete w/read - Clinics  - Basic Metabolic Panel (8) ([facility])  - VENOUS COLLECTION    Acute intractable headache, unspecified headache type  See above.  Very close follow up.    Patient is agreement with the assessment and plan as outlined above.  All questions answered.  Red flag symptoms that should prompt emergent evaluation discussed and understood.      Follow up in 2 weeks          "

## 2023-04-03 NOTE — PATIENT INSTRUCTIONS
For informational purposes only. Not to replace the advice of your health care provider. Copyright   2003,  [employer] [facility]. All rights reserved. Clinically reviewed by Katarina Coronado MD. [employer] [id number] - REV .  Preparing for Your Surgery  Getting started  A nurse will call you to review your health history and instructions. They will give you an arrival time based on your scheduled surgery time. Please be ready to share:    Your doctor's clinic name and phone number    Your medical, surgical, and anesthesia history    A list of allergies and sensitivities    A list of medicines, including herbal treatments and over-the-counter drugs    Whether the patient has a legal guardian (ask how to send us the papers in advance)  Please tell us if you're pregnant--or if there's any chance you might be pregnant. Some surgeries may injure a fetus (unborn baby), so they require a pregnancy test. Surgeries that are safe for a fetus don't always need a test, and you can choose whether to have one.   If you have a child who's having surgery, please ask for a copy of Preparing for Your Child's Surgery.    Preparing for surgery    Within 10 to 30 days of surgery: Have a pre-op exam (sometimes called an H&P, or History and Physical). This can be done at a clinic or pre-operative center.  ? If you're having a , you may not need this exam. Talk to your care team.    At your pre-op exam, talk to your care team about all medicines you take. If you need to stop any medicines before surgery, ask when to start taking them again.  ? We do this for your safety. Many medicines can make you bleed too much during surgery. Some change how well surgery (anesthesia) drugs work.    Call your insurance company to let them know you're having surgery. (If you don't have insurance, call [phone].)    Call your clinic if there's any change in your health. This includes signs of a cold or flu (sore throat, runny nose,  cough, rash, fever). It also includes a scrape or scratch near the surgery site.    If you have questions on the day of surgery, call your hospital or surgery center.  Eating and drinking guidelines  For your safety: Unless your surgeon tells you otherwise, follow the guidelines below.    Eat and drink as usual until 8 hours before you arrive for surgery. After that, no food or milk.    Drink clear liquids until 2 hours before you arrive. These are liquids you can see through, like water, Gatorade, and Propel Water. They also include plain black coffee and tea (no cream or milk), candy, and breath mints. You can spit out gum when you arrive.    If you drink alcohol: Stop drinking it the night before surgery.    If your care team tells you to take medicine on the morning of surgery, it's okay to take it with a sip of water.  Preventing infection    Shower or bathe the night before and morning of your surgery. Follow the instructions your clinic gave you. (If no instructions, use regular soap.)    Don't shave or clip hair near your surgery site. We'll remove the hair if needed.    Don't smoke or vape the morning of surgery. You may chew nicotine gum up to 2 hours before surgery. A nicotine patch is okay.  ? Note: Some surgeries require you to completely quit smoking and nicotine. Check with your surgeon.    Your care team will make every effort to keep you safe from infection. We will:  ? Clean our hands often with soap and water (or an alcohol-based hand rub).  ? Clean the skin at your surgery site with a special soap that kills germs.  ? Give you a special gown to keep you warm. (Cold raises the risk of infection.)  ? Wear special hair covers, masks, gowns and gloves during surgery.  ? Give antibiotic medicine, if prescribed. Not all surgeries need antibiotics.  What to bring on the day of surgery    Photo ID and insurance card    Copy of your health care directive, if you have one    Glasses and hearing aids (bring  cases)  ? You can't wear contacts during surgery    Inhaler and eye drops, if you use them (tell us about these when you arrive)    CPAP machine or breathing device, if you use them    A few personal items, if spending the night    If you have . . .  ? A pacemaker, ICD (cardiac defibrillator) or other implant: Bring the ID card.  ? An implanted stimulator: Bring the remote control.  ? A legal guardian: Bring a copy of the certified (court-stamped) guardianship papers.  Please remove any jewelry, including body piercings. Leave jewelry and other valuables at home.  If you're going home the day of surgery    You must have a responsible adult drive you home. They should stay with you overnight as well.    If you don't have someone to stay with you, and you aren't safe to go home alone, we may keep you overnight. Insurance often won't pay for this.  After surgery  If it's hard to control your pain or you need more pain medicine, please call your surgeon's office.  Questions?   If you have any questions for your care team, list them here: _________________________________________________________________________________________________________________________________________________________________________ ____________________________________ ____________________________________ ____________________________________

## 2023-04-04 LAB
BUN SERPL-MCNC: 19 MG/DL (ref 8–27)
BUN/CREATININE RATIO: 27 (ref 10–24)
CALCIUM SERPL-MCNC: 9 MG/DL (ref 8.6–10.2)
CHLORIDE SERPLBLD-SCNC: 104 MMOL/L (ref 96–106)
CREAT SERPL-MCNC: 0.7 MG/DL (ref 0.76–1.27)
EGFR: 96 ML/MIN/1.73
GLUCOSE SERPL-MCNC: 77 MG/DL (ref 70–99)
POTASSIUM SERPL-SCNC: 4.2 MMOL/L (ref 3.5–5.2)
SODIUM SERPL-SCNC: 141 MMOL/L (ref 134–144)
TOTAL CO2: 26 MMOL/L (ref 20–29)

## 2023-05-01 ENCOUNTER — OFFICE VISIT (OUTPATIENT)
Dept: FAMILY MEDICINE | Facility: CLINIC | Age: 78
End: 2023-05-01

## 2023-05-01 VITALS
BODY MASS INDEX: 30.39 KG/M2 | HEIGHT: 69 IN | DIASTOLIC BLOOD PRESSURE: 83 MMHG | HEART RATE: 76 BPM | SYSTOLIC BLOOD PRESSURE: 141 MMHG | WEIGHT: 205.2 LBS

## 2023-05-01 DIAGNOSIS — I10 PRIMARY HYPERTENSION: ICD-10-CM

## 2023-05-01 DIAGNOSIS — R73.03 PREDIABETES: ICD-10-CM

## 2023-05-01 DIAGNOSIS — M25.562 CHRONIC PAIN OF LEFT KNEE: ICD-10-CM

## 2023-05-01 DIAGNOSIS — E78.5 DYSLIPIDEMIA: ICD-10-CM

## 2023-05-01 DIAGNOSIS — L90.5 SCAR TISSUE: ICD-10-CM

## 2023-05-01 DIAGNOSIS — G89.29 CHRONIC PAIN OF LEFT KNEE: ICD-10-CM

## 2023-05-01 DIAGNOSIS — Z01.818 PREOP GENERAL PHYSICAL EXAM: Primary | ICD-10-CM

## 2023-05-01 PROCEDURE — 36415 COLL VENOUS BLD VENIPUNCTURE: CPT | Performed by: FAMILY MEDICINE

## 2023-05-01 PROCEDURE — 99214 OFFICE O/P EST MOD 30 MIN: CPT | Performed by: FAMILY MEDICINE

## 2023-05-01 NOTE — PATIENT INSTRUCTIONS
For informational purposes only. Not to replace the advice of your health care provider. Copyright   2003,  [employer] [facility]. All rights reserved. Clinically reviewed by Katarina Coronado MD. "[employer] [id number] - REV .  Preparing for Your Surgery  Getting started  A nurse will call you to review your health history and instructions. They will give you an arrival time based on your scheduled surgery time. Please be ready to share:  Your doctor's clinic name and phone number  Your medical, surgical, and anesthesia history  A list of allergies and sensitivities  A list of medicines, including herbal treatments and over-the-counter drugs  Whether the patient has a legal guardian (ask how to send us the papers in advance)  Please tell us if you're pregnant--or if there's any chance you might be pregnant. Some surgeries may injure a fetus (unborn baby), so they require a pregnancy test. Surgeries that are safe for a fetus don't always need a test, and you can choose whether to have one.   If you have a child who's having surgery, please ask for a copy of Preparing for Your Child's Surgery.    Preparing for surgery  Within 10 to 30 days of surgery: Have a pre-op exam (sometimes called an H&P, or History and Physical). This can be done at a clinic or pre-operative center.  If you're having a , you may not need this exam. Talk to your care team.  At your pre-op exam, talk to your care team about all medicines you take. If you need to stop any medicines before surgery, ask when to start taking them again.  We do this for your safety. Many medicines can make you bleed too much during surgery. Some change how well surgery (anesthesia) drugs work.  Call your insurance company to let them know you're having surgery. (If you don't have insurance, call [phone].)  Call your clinic if there's any change in your health. This includes signs of a cold or flu (sore throat, runny nose, cough, rash, fever). It  also includes a scrape or scratch near the surgery site.  If you have questions on the day of surgery, call your hospital or surgery center.  Eating and drinking guidelines  For your safety: Unless your surgeon tells you otherwise, follow the guidelines below.  Eat and drink as usual until 8 hours before you arrive for surgery. After that, no food or milk.  Drink clear liquids until 2 hours before you arrive. These are liquids you can see through, like water, Gatorade, and Propel Water. They also include plain black coffee and tea (no cream or milk), candy, and breath mints. You can spit out gum when you arrive.  If you drink alcohol: Stop drinking it the night before surgery.  If your care team tells you to take medicine on the morning of surgery, it's okay to take it with a sip of water.  Preventing infection  Shower or bathe the night before and morning of your surgery. Follow the instructions your clinic gave you. (If no instructions, use regular soap.)  Don't shave or clip hair near your surgery site. We'll remove the hair if needed.  Don't smoke or vape the morning of surgery. You may chew nicotine gum up to 2 hours before surgery. A nicotine patch is okay.  Note: Some surgeries require you to completely quit smoking and nicotine. Check with your surgeon.  Your care team will make every effort to keep you safe from infection. We will:  Clean our hands often with soap and water (or an alcohol-based hand rub).  Clean the skin at your surgery site with a special soap that kills germs.  Give you a special gown to keep you warm. (Cold raises the risk of infection.)  Wear special hair covers, masks, gowns and gloves during surgery.  Give antibiotic medicine, if prescribed. Not all surgeries need antibiotics.  What to bring on the day of surgery  Photo ID and insurance card  Copy of your health care directive, if you have one  Glasses and hearing aids (bring cases)  You can't wear contacts during surgery  Inhaler and  eye drops, if you use them (tell us about these when you arrive)  CPAP machine or breathing device, if you use them  A few personal items, if spending the night  If you have . . .  A pacemaker, ICD (cardiac defibrillator) or other implant: Bring the ID card.  An implanted stimulator: Bring the remote control.  A legal guardian: Bring a copy of the certified (court-stamped) guardianship papers.  Please remove any jewelry, including body piercings. Leave jewelry and other valuables at home.  If you're going home the day of surgery  You must have a responsible adult drive you home. They should stay with you overnight as well.  If you don't have someone to stay with you, and you aren't safe to go home alone, we may keep you overnight. Insurance often won't pay for this.  After surgery  If it's hard to control your pain or you need more pain medicine, please call your surgeon's office.  Questions?   If you have any questions for your care team, list them here: _________________________________________________________________________________________________________________________________________________________________________ ____________________________________ ____________________________________ ____________________________________    How to Take Your Medication Before Surgery  - HOLD (do not take) Aspirin for 7 days

## 2023-05-01 NOTE — PROGRESS NOTES
[facility] [address] [place] [address]  Phone: [phone]  Fax: [phone]  Primary Provider: Hang Omalley  Pre-op Performing Provider: HANG OMALLEY      PREOPERATIVE EVALUATION:  Today's date: 5/1/2023    Daniel Samayoa is a 77 year old male who presents for a preoperative evaluation.    Surgical Information:  Surgery/Procedure: Knee  Surgery Location: [facility]  Surgeon: TBD  Surgery Date: 5/8/2023  Time of Surgery: TBD  Where patient plans to recover: At home with family  Fax number for surgical facility: [phone]    Assessment & Plan     The proposed surgical procedure is considered INTERMEDIATE risk.    Preop general physical exam  No apparent contraindications  - Basic Metabolic Panel (8) ([facility])  - VENOUS COLLECTION    Scar tissue  Planned surgical excision    Chronic pain of left knee  Secondary to above    Prediabetes  asymptomatic    Dyslipidemia  Asymptomatic, cont statin    Primary hypertension  Now at goal according to home readings.  Cont olmesartan.  Recheck BMP              - No identified additional risk factors other than previously addressed    Antiplatelet or Anticoagulation Medication Instructions:   - hold aspirin for 7 days    Additional Medication Instructions:  Patient is to take all scheduled medications on the day of surgery EXCEPT for modifications listed below:  Hold aspirin as above  RECOMMENDATION:  APPROVAL GIVEN to proceed with proposed procedure, without further diagnostic evaluation.    Subjective     HPI related to upcoming procedure: Here for preoperative risk assessment.      HTN: much improved with Olmesartan.  Home readings at goal.  No side effects.  Headache resolved    History of prostate cancer: s/p radical prostatectomy, PSA undetectable.       Prediabetes: stable, asymptomatic     HLD: doing well on statin      4/30/2023    10:37 PM   Preop Questions   1. Have you ever had a heart attack or  stroke? No   2. Have you ever had surgery on your heart or blood vessels, such as a stent placement, a coronary artery bypass, or surgery on an artery in your head, neck, heart, or legs? No   3. Do you have chest pain with activity? No   4. Do you have a history of  heart failure? No   5. Do you currently have a cold, bronchitis or symptoms of other infection? No   6. Do you have a cough, shortness of breath, or wheezing? No   7. Do you or anyone in your family have previous history of blood clots? No   8. Do you or does anyone in your family have a serious bleeding problem such as prolonged bleeding following surgeries or cuts? No   9. Have you ever had problems with anemia or been told to take iron pills? No   10. Have you had any abnormal blood loss such as black, tarry or bloody stools? No   11. Have you ever had a blood transfusion? No   12. Are you willing to have a blood transfusion if it is medically needed before, during, or after your surgery? Yes   13. Have you or any of your relatives ever had problems with anesthesia? No   14. Do you have sleep apnea, excessive snoring or daytime drowsiness? No   15. Do you have any artifical heart valves or other implanted medical devices like a pacemaker, defibrillator, or continuous glucose monitor? No   16. Do you have artificial joints? YES -    17. Are you allergic to latex? No       Health Care Directive:  Patient has a Health Care Directive on file      Preoperative Review of :   reviewed - no record of controlled substances prescribed.      Status of Chronic Conditions:  See problem list for active medical problems.  Problems all longstanding and stable, except as noted/documented.  See ROS for pertinent symptoms related to these conditions.      Review of Systems  CONSTITUTIONAL: NEGATIVE for fever, chills, change in weight  INTEGUMENTARY/SKIN: NEGATIVE for worrisome rashes, moles or lesions  EYES: NEGATIVE for vision changes or irritation  ENT/MOUTH:  NEGATIVE for ear, mouth and throat problems  RESP: NEGATIVE for significant cough or SOB  CV: NEGATIVE for chest pain, palpitations or peripheral edema  GI: NEGATIVE for nausea, abdominal pain, heartburn, or change in bowel habits  : NEGATIVE for frequency, dysuria, or hematuria  MUSCULOSKELETAL: NEGATIVE for significant arthralgias or myalgia  NEURO: NEGATIVE for weakness, dizziness or paresthesias  ENDOCRINE: NEGATIVE for temperature intolerance, skin/hair changes  HEME: NEGATIVE for bleeding problems  PSYCHIATRIC: NEGATIVE for changes in mood or affect    Patient Active Problem List    Diagnosis Date Noted     Status post total right knee replacement 06/24/2021     Priority: Medium     Primary osteoarthritis of both knees 06/15/2021     Priority: Medium     S/P prostatectomy 08/31/2020     Priority: Medium     Prediabetes 08/28/2020     Priority: Medium     History of prostate cancer 08/29/2019     Priority: Medium     Other male erectile dysfunction 06/19/2019     Priority: Medium     Cataracts, both eyes 12/17/2014     Priority: Medium     Very Mild       PVD (posterior vitreous detachment), both eyes 12/17/2014     Priority: Medium     Health Care Home 08/29/2013     Priority: Medium     .State Tier Level:  Tier 1   8/20/14               Health Care Home 10/19/2012     Priority: Medium     Brigitte Hickman RN-BC  [phone]  [facility]          DX V65.8 REPLACED WITH 25370 HEALTH CARE HOME (04/08/2013)       Dyslipidemia      Priority: Medium      Past Medical History:   Diagnosis Date     Cataract 5/10/2012     Dyslipidemia      Hyperlipidaemia LDL goal < 100      Prostate cancer (H)      Past Surgical History:   Procedure Laterality Date     APPENDECTOMY  1980     AS TOTAL KNEE ARTHROPLASTY Right     6/24/2021     LASER SURGERY OF EYE      correction left lense     LASIK  2 -3 years ago     left eye only     LEG SURGERY  2004    [facility]     PROSTATECTOMY RETROPUBIC RADICAL  2002     Radical at [place]     SHOULDER SURGERY Right     [facility], 2019     Current Outpatient Medications   Medication Sig Dispense Refill     amoxicillin (AMOXIL) 500 MG capsule TAKE 4 CAPSULES BY MOUTH ONE HOUR PRIOR TO APPT       Ascorbic Acid (C 1000 PO) Take by mouth every other day        aspirin 81 MG EC tablet Take 81 mg by mouth daily. (Patient not taking: Reported on 4/3/2023)       B Complex Vitamins (B COMPLEX 50 PO) Take  by mouth.       calcium carbonate (TUMS) 500 MG chewable tablet Take 2 chew tab by mouth daily       Calcium-Vitamin D (CALCIUM 500 +D) 500-125 MG-UNIT TABS Take  by mouth daily.       fish oil-omega-3 fatty acids 1000 MG capsule Take 2 g by mouth daily.       fluocinolone acetonide 0.01 % OIL Apply 5 drops in affected ear 1-2 times daily for 5-7 days as needed for itching. (Patient not taking: Reported on 4/3/2023) 20 mL 1     ibuprofen (ADVIL/MOTRIN) 200 MG capsule Take 200 mg by mouth every 4 hours as needed.       Multiple Vitamins-Minerals (ZINC PO) Take 1 tablet by mouth       olmesartan (BENICAR) 5 MG tablet Take 1 tablet (5 mg) by mouth daily 90 tablet 0     omeprazole (PRILOSEC) 20 MG DR capsule Take 1 capsule (20 mg) by mouth daily (Patient taking differently: Take 20 mg by mouth as needed) 90 capsule 1     Pyridoxine HCl (VITAMIN B-6 PO) Take 100 mg by mouth       rosuvastatin (CRESTOR) 5 MG tablet TAKE 1 TABLET BY MOUTH EVERYDAY AT BEDTIME 90 tablet 3     sildenafil (REVATIO) 20 MG tablet TAKE UP TO THREE TABLETS AS NEEDED FOR SEXUAL ACTIVITY.  Never use with nitroglycerin, terazosin or doxazosin. 90 tablet 3     trimethoprim-polymyxin b (POLYTRIM) 05296-9.1 UNIT/ML-% ophthalmic solution Place 1 drop Into the left eye 3 times daily 10 mL 0     Vitamin A 7.5 MG (51267 UT) CAPS          Allergies   Allergen Reactions     Simvastatin Other (See Comments) and Muscle Pain (Myalgia)     Per paper chart medical records -- patient tried zocor in 7/2004 and had myalgias so discontinued.  3/2014  tried again -- myalgias again.        Social History     Tobacco Use     Smoking status: Never     Smokeless tobacco: Never   Vaping Use     Vaping status: Not on file   Substance Use Topics     Alcohol use: Yes     Alcohol/week: 1.7 standard drinks of alcohol     Types: 2 Cans of beer per week     Comment: little     Family History   Problem Relation Age of Onset     Hypertension Mother      Diabetes Mother      Lung Cancer Father      Rectal Cancer Sister      Pancreatic Cancer Brother      Pancreatic Cancer Brother      Glaucoma No family hx of      Macular Degeneration No family hx of      History   Drug Use No         Objective     There were no vitals taken for this visit.    Physical Exam    GENERAL APPEARANCE: healthy, alert and no distress     EYES: EOMI,  PERRL     HENT: ear canals and TM's normal and nose and mouth without ulcers or lesions     NECK: no adenopathy, no asymmetry, masses, or scars and thyroid normal to palpation     RESP: lungs clear to auscultation - no rales, rhonchi or wheezes     CV: regular rates and rhythm, normal S1 S2, no S3 or S4 and no murmur, click or rub     ABDOMEN:  soft, nontender, no HSM or masses and bowel sounds normal     MS: extremities normal- no gross deformities noted, no evidence of inflammation in joints, FROM in all extremities.     SKIN: no suspicious lesions or rashes     NEURO: Normal strength and tone, sensory exam grossly normal, mentation intact and speech normal     PSYCH: mentation appears normal. and affect normal/bright     LYMPHATICS: No cervical adenopathy    Recent Labs   Lab Test 04/03/23  1515 08/29/22  0956 08/29/22  0000 12/13/21  1322 12/13/21  0000 06/15/21  1055   HGB  --   --  14.4  --  13.8  --    PLT  --   --  172  --  202  --     142  --    < >  --  143   POTASSIUM 4.2 3.9  --    < >  --  4.3   CR 0.70* 0.88  --    < >  --  0.82   A1C  --  6.2*  --   --   --  5.8*    < > = values in this interval not displayed.         Diagnostics:  Labs pending at this time.  Results will be reviewed when available.   No EKG this visit, completed in the last 90 days.    Revised Cardiac Risk Index (RCRI):  The patient has the following serious cardiovascular risks for perioperative complications:   - No serious cardiac risks = 0 points     RCRI Interpretation: 0 points: Class I (very low risk - 0.4% complication rate)           Signed Electronically by: Hang Omalley MD  Copy of this evaluation report is provided to requesting physician.

## 2023-05-02 LAB
BUN SERPL-MCNC: 23 MG/DL (ref 8–27)
BUN/CREATININE RATIO: 31 (ref 10–24)
CALCIUM SERPL-MCNC: 9.5 MG/DL (ref 8.6–10.2)
CHLORIDE SERPLBLD-SCNC: 107 MMOL/L (ref 96–106)
CREAT SERPL-MCNC: 0.74 MG/DL (ref 0.76–1.27)
EGFR: 94 ML/MIN/1.73
GLUCOSE SERPL-MCNC: 108 MG/DL (ref 70–99)
POTASSIUM SERPL-SCNC: 4.2 MMOL/L (ref 3.5–5.2)
SODIUM SERPL-SCNC: 144 MMOL/L (ref 134–144)
TOTAL CO2: 24 MMOL/L (ref 20–29)

## 2023-05-04 NOTE — PROGRESS NOTES
5/3/23 faxed preop to [facility] @ [phone]    Bakari Erickson,   [facility]  [phone]

## 2023-05-16 ENCOUNTER — TRANSFERRED RECORDS (OUTPATIENT)
Dept: FAMILY MEDICINE | Facility: CLINIC | Age: 78
End: 2023-05-16

## 2023-08-01 ENCOUNTER — OFFICE VISIT (OUTPATIENT)
Dept: OPTOMETRY | Facility: CLINIC | Age: 78
End: 2023-08-01
Payer: COMMERCIAL

## 2023-08-01 DIAGNOSIS — H52.223 REGULAR ASTIGMATISM OF BOTH EYES: ICD-10-CM

## 2023-08-01 DIAGNOSIS — H43.812 PVD (POSTERIOR VITREOUS DETACHMENT), LEFT: ICD-10-CM

## 2023-08-01 DIAGNOSIS — Z01.01 ENCOUNTER FOR EXAMINATION OF EYES AND VISION WITH ABNORMAL FINDINGS: Primary | ICD-10-CM

## 2023-08-01 DIAGNOSIS — H52.13 MYOPIA OF BOTH EYES: ICD-10-CM

## 2023-08-01 DIAGNOSIS — Z98.890 S/P LASIK SURGERY: ICD-10-CM

## 2023-08-01 DIAGNOSIS — H52.4 PRESBYOPIA: ICD-10-CM

## 2023-08-01 DIAGNOSIS — H25.13 NUCLEAR AGE-RELATED CATARACT, BOTH EYES: ICD-10-CM

## 2023-08-01 PROCEDURE — 92015 DETERMINE REFRACTIVE STATE: CPT | Performed by: OPTOMETRIST

## 2023-08-01 PROCEDURE — 92014 COMPRE OPH EXAM EST PT 1/>: CPT | Performed by: OPTOMETRIST

## 2023-08-01 ASSESSMENT — REFRACTION_WEARINGRX
OD_CYLINDER: +1.50
OS_ADD: +2.50
OS_CYLINDER: SPHERE
OD_ADD: +2.50
OS_CYLINDER: SPHERE
OD_AXIS: 013
OD_AXIS: 017
SPECS_TYPE: READING
OD_CYLINDER: +1.25
OS_SPHERE: +2.00
OS_SPHERE: -0.25
SPECS_TYPE: PAL
OD_SPHERE: PLANO
OD_SPHERE: -2.50

## 2023-08-01 ASSESSMENT — REFRACTION_MANIFEST
OS_ADD: +2.50
OD_CYLINDER: +2.00
OS_SPHERE: -1.75
OS_AXIS: 107
OS_AXIS: 110
OD_AXIS: 015
OD_AXIS: 008
OS_CYLINDER: +1.00
OS_CYLINDER: +0.75
OS_SPHERE: -1.75
OD_SPHERE: -3.00
OD_CYLINDER: +1.00
OD_SPHERE: -3.25
OD_ADD: +2.50
METHOD_AUTOREFRACTION: 1

## 2023-08-01 ASSESSMENT — CONF VISUAL FIELD
OD_NORMAL: 1
OD_INFERIOR_TEMPORAL_RESTRICTION: 0
METHOD: COUNTING FINGERS
OS_INFERIOR_TEMPORAL_RESTRICTION: 0
OS_NORMAL: 1
OS_SUPERIOR_TEMPORAL_RESTRICTION: 0
OD_SUPERIOR_TEMPORAL_RESTRICTION: 0
OD_SUPERIOR_NASAL_RESTRICTION: 0
OD_INFERIOR_NASAL_RESTRICTION: 0
OS_SUPERIOR_NASAL_RESTRICTION: 0
OS_INFERIOR_NASAL_RESTRICTION: 0

## 2023-08-01 ASSESSMENT — KERATOMETRY
OS_K2POWER_DIOPTERS: 39.75
OD_K2POWER_DIOPTERS: 41.75
OD_AXISANGLE2_DEGREES: 122
OS_K1POWER_DIOPTERS: 39.00
OD_K1POWER_DIOPTERS: 41.00
OD_AXISANGLE_DEGREES: 032
OS_AXISANGLE2_DEGREES: 009
OS_AXISANGLE_DEGREES: 099

## 2023-08-01 ASSESSMENT — TONOMETRY
OS_IOP_MMHG: 18
OD_IOP_MMHG: 19
IOP_METHOD: APPLANATION

## 2023-08-01 ASSESSMENT — VISUAL ACUITY
OS_CC+: -1
METHOD: SNELLEN - LINEAR
OD_CC: 20/50
OS_CC: 20/60
OD_PH_CC: 20/30
OS_CC: J2
CORRECTION_TYPE: GLASSES
OS_PH_CC: 20/30

## 2023-08-01 ASSESSMENT — CUP TO DISC RATIO
OS_RATIO: 0.25
OD_RATIO: 0.25

## 2023-08-01 ASSESSMENT — SLIT LAMP EXAM - LIDS
COMMENTS: NORMAL
COMMENTS: NORMAL

## 2023-08-01 ASSESSMENT — EXTERNAL EXAM - LEFT EYE: OS_EXAM: NORMAL

## 2023-08-01 ASSESSMENT — EXTERNAL EXAM - RIGHT EYE: OD_EXAM: NORMAL

## 2023-08-01 NOTE — LETTER
8/1/2023         RE: Daniel Samayoa  [address]  [facility] [address]        Dear Colleague,    Thank you for referring your patient, Daniel Samayoa, to the [facility]. Please see a copy of my visit note below.    Chief Complaint   Patient presents with     Diabetic Eye Exam        Chief Complaint(s) and History of Present Illness(es)       Diabetic Eye Exam              Diabetes Type: Type 2 and controlled with diet                   Lab Results   Component Value Date    A1C 6.2 08/29/2022    A1C 5.8 06/15/2021    A1C 5.8 08/24/2020    A1C 5.9 08/22/2019    A1C 5.9 05/03/2018       Last Eye Exam: October 2022  Dilated Previously: Yes, side effects of dilation explained today    What are you currently using to see?  glasses and readers    Pt also mentions last time he saw floaters was about 1yr ago    Distance Vision Acuity: Road signs seem to be looking more blurred while wearing glasses     Near Vision Acuity: Small print could be more clear while wearing glasses. He will sometimes switch to reading glasses.     Eye Comfort: oily - he uses OTC eye scrubs  Do you use eye drops? : Yes: eye wash only   Occupation or Hobbies: retired - [employer]    Jewels Carmen, KRISTIN        Medical, surgical and family histories reviewed and updated 8/1/2023.       OBJECTIVE: See Ophthalmology exam    ASSESSMENT:    ICD-10-CM    1. Encounter for examination of eyes and vision with abnormal findings  Z01.01       2. Nuclear age-related cataract, both eyes  H25.13       3. PVD (posterior vitreous detachment), left  H43.812       4. S/P LASIK surgery  Z98.890       5. Myopia of both eyes  H52.13       6. Regular astigmatism of both eyes  H52.223       7. Presbyopia  H52.4           PLAN:    Daniel Samayoa aware  eye exam results will be sent to Hang Omalley.  Patient Instructions   You have the start of mild cataracts.  You may notice some blurred vision or glare with night driving.  It is  "important that you wear good sunglasses to protect your eyes from the ultraviolet light from the sun.     You have a PVD- posterior vitreous detachment which is due to the gel of the eye shrinking and clumping together.  This can sometimes cause holes or tears in the retina.  The signs of a retinal detachment are flashes of light or a \"curtain veil\" coming over your vision. If you notice any of these changes return to clinic for re-evaluation.     Updated glasses prescription provided today.   Allow 2 weeks to adapt to the new glasses.   Wear glasses full-time.     Return in 1 year for a comprehensive eye exam, or sooner if needed.      The effects of the dilating drops last for 4- 6 hours.  You will be more sensitive to light and vision will be blurry up close.  Mydriatic sunglasses were given if needed.     Min Kirkland, OD  [facility]  [address] [place] [address]. [place]  [address]    [phone]           Again, thank you for allowing me to participate in the care of your patient.        Sincerely,        Min Kirkland, OD  "

## 2023-08-01 NOTE — PATIENT INSTRUCTIONS
"You have the start of mild cataracts.  You may notice some blurred vision or glare with night driving.  It is important that you wear good sunglasses to protect your eyes from the ultraviolet light from the sun.     You have a PVD- posterior vitreous detachment which is due to the gel of the eye shrinking and clumping together.  This can sometimes cause holes or tears in the retina.  The signs of a retinal detachment are flashes of light or a \"curtain veil\" coming over your vision. If you notice any of these changes return to clinic for re-evaluation.     Updated glasses prescription provided today.   Allow 2 weeks to adapt to the new glasses.   Wear glasses full-time.     Return in 1 year for a comprehensive eye exam, or sooner if needed.      The effects of the dilating drops last for 4- 6 hours.  You will be more sensitive to light and vision will be blurry up close.  Mydriatic sunglasses were given if needed.     Min Kirkland, OD  [address]  [place]  [address]    [phone]   "

## 2023-08-01 NOTE — PROGRESS NOTES
Chief Complaint   Patient presents with    Diabetic Eye Exam        Chief Complaint(s) and History of Present Illness(es)       Diabetic Eye Exam              Diabetes Type: Type 2 and controlled with diet                   Lab Results   Component Value Date    A1C 6.2 08/29/2022    A1C 5.8 06/15/2021    A1C 5.8 08/24/2020    A1C 5.9 08/22/2019    A1C 5.9 05/03/2018       Last Eye Exam: October 2022  Dilated Previously: Yes, side effects of dilation explained today    What are you currently using to see?  glasses and readers    Pt also mentions last time he saw floaters was about 1yr ago    Distance Vision Acuity: Road signs seem to be looking more blurred while wearing glasses     Near Vision Acuity: Small print could be more clear while wearing glasses. He will sometimes switch to reading glasses.     Eye Comfort: oily - he uses OTC eye scrubs  Do you use eye drops? : Yes: eye wash only   Occupation or Hobbies: retired - [employer]    Jewels Morales, OA        Medical, surgical and family histories reviewed and updated 8/1/2023.       OBJECTIVE: See Ophthalmology exam    ASSESSMENT:    ICD-10-CM    1. Encounter for examination of eyes and vision with abnormal findings  Z01.01       2. Nuclear age-related cataract, both eyes  H25.13       3. PVD (posterior vitreous detachment), left  H43.812       4. S/P LASIK surgery  Z98.890       5. Myopia of both eyes  H52.13       6. Regular astigmatism of both eyes  H52.223       7. Presbyopia  H52.4           PLAN:    Daniel Samayoa aware  eye exam results will be sent to Hang Omalley.  Patient Instructions   You have the start of mild cataracts.  You may notice some blurred vision or glare with night driving.  It is important that you wear good sunglasses to protect your eyes from the ultraviolet light from the sun.     You have a PVD- posterior vitreous detachment which is due to the gel of the eye shrinking and clumping together.  This can sometimes cause holes  "or tears in the retina.  The signs of a retinal detachment are flashes of light or a \"curtain veil\" coming over your vision. If you notice any of these changes return to clinic for re-evaluation.     Updated glasses prescription provided today.   Allow 2 weeks to adapt to the new glasses.   Wear glasses full-time.     Return in 1 year for a comprehensive eye exam, or sooner if needed.      The effects of the dilating drops last for 4- 6 hours.  You will be more sensitive to light and vision will be blurry up close.  Mydriatic sunglasses were given if needed.     Min Kirkland, OD  [address]  [place], [place]  [id number] [phone]         "

## 2023-08-05 DIAGNOSIS — E78.5 DYSLIPIDEMIA: ICD-10-CM

## 2023-08-07 RX ORDER — ROSUVASTATIN CALCIUM 5 MG/1
TABLET, COATED ORAL
Qty: 90 TABLET | Refills: 3 | Status: SHIPPED | OUTPATIENT
Start: 2023-08-07

## 2023-08-07 NOTE — TELEPHONE ENCOUNTER
Rosuvastatin  LOV 5/1/23  Dyslipidemia  Asymptomatic, cont statin  Next appointment 8/16/23 With Omalley  BP Readings from Last 3 Encounters:   05/01/23 (!) 141/83   04/03/23 (!) 172/96   11/15/22 130/72     Component      Latest Ref Rng 5/1/2023   Glucose      70 - 99 mg/dL 108 (H)    Urea Nitrogen      8 - 27 mg/dL 23    Creatinine      0.76 - 1.27 mg/dL 0.74 (L)    eGFR       >59 mL/min/1.73 94    BUN/Creatinine Ratio      10 - 24  31 (H)    Sodium      134 - 144 mmol/L 144    Potassium      3.5 - 5.2 mmol/L 4.2    Chloride      96 - 106 mmol/L 107 (H)    Total CO2      20 - 29 mmol/L 24    Calcium      8.6 - 10.2 mg/dL 9.5       Legend:  (H) High  (L) Low

## 2023-08-09 ASSESSMENT — ACTIVITIES OF DAILY LIVING (ADL): CURRENT_FUNCTION: NO ASSISTANCE NEEDED

## 2023-08-09 ASSESSMENT — ENCOUNTER SYMPTOMS
HEADACHES: 0
CONSTIPATION: 0
SORE THROAT: 0
DIARRHEA: 0
SHORTNESS OF BREATH: 0
DIZZINESS: 0
MYALGIAS: 0
FREQUENCY: 0
EYE PAIN: 0
WEAKNESS: 0
NAUSEA: 0
DYSURIA: 0
HEMATOCHEZIA: 0
CHILLS: 0
FEVER: 0
HEMATURIA: 0
PALPITATIONS: 0
NERVOUS/ANXIOUS: 0
JOINT SWELLING: 0
COUGH: 0
ABDOMINAL PAIN: 0
ARTHRALGIAS: 0
PARESTHESIAS: 0
HEARTBURN: 0

## 2023-08-15 ASSESSMENT — ENCOUNTER SYMPTOMS
JOINT SWELLING: 0
MYALGIAS: 0
ABDOMINAL PAIN: 0
NERVOUS/ANXIOUS: 0
ARTHRALGIAS: 0
EYE PAIN: 0
HEADACHES: 0
DIARRHEA: 0
FEVER: 0
CHILLS: 0
PALPITATIONS: 0
COUGH: 0
HEMATURIA: 0
CONSTIPATION: 0
FREQUENCY: 0
NAUSEA: 0
DYSURIA: 0
SORE THROAT: 0
DIZZINESS: 0
HEARTBURN: 0
WEAKNESS: 0
PARESTHESIAS: 0
SHORTNESS OF BREATH: 0
HEMATOCHEZIA: 0

## 2023-08-15 ASSESSMENT — ACTIVITIES OF DAILY LIVING (ADL): CURRENT_FUNCTION: NO ASSISTANCE NEEDED

## 2023-08-15 NOTE — PROGRESS NOTES
"SUBJECTIVE:   Dipesh is a 77 year old who presents for Preventive Visit.    Are you in the first 12 months of your Medicare coverage?  No    History of prostate cancer: s/p radical prostatectomy, PSA undetectable.       Prediabetes: stable, asymptomatic     HLD: doing well on statin    Healthy Habits:     In general, how would you rate your overall health?  Good    Frequency of exercise:  4-5 days/week    Duration of exercise:  45-60 minutes    Do you usually eat at least 4 servings of fruit and vegetables a day, include whole grains    & fiber and avoid regularly eating high fat or \"junk\" foods?  Yes    Taking medications regularly:  Yes    Medication side effects:  None    Ability to successfully perform activities of daily living:  No assistance needed    Home Safety:  No safety concerns identified    Hearing Impairment:  No hearing concerns    In the past 6 months, have you been bothered by leaking of urine?  No    In general, how would you rate your overall mental or emotional health?  Excellent    Additional concerns today:  No    Hearing Screening:  Right Ear  4000Hz: pass  2000Hz: pass  1000Hz: pass  500Hz: pass    Left Ear  4000Hz: pass  2000Hz: pass  1000Hz: pass  500Hz: pass     Have you ever done Advance Care Planning? (For example, a Health Directive, POLST, or a discussion with a medical provider or your loved ones about your wishes): No, advance care planning information given to patient to review.  Patient plans to discuss their wishes with loved ones or provider.       Fall risk       Cognitive Screening   1) Repeat 3 items (Leader, Season, Table)    2) Clock draw: NORMAL  3) 3 item recall: Recalls 3 objects  Results: 3 items recalled: COGNITIVE IMPAIRMENT LESS LIKELY  Mini-CogTM Copyright S Jese. Licensed by the author for use in [facility]; reprinted with permission (rafa@.[facility]). All rights reserved.      Do you have sleep apnea, excessive snoring or daytime drowsiness? : " no    Reviewed and updated as needed this visit by clinical staff                  Reviewed and updated as needed this visit by Provider                 Social History     Tobacco Use    Smoking status: Never    Smokeless tobacco: Never   Substance Use Topics    Alcohol use: Yes     Alcohol/week: 1.7 standard drinks of alcohol     Types: 2 Cans of beer per week     Comment: do             8/9/2023     9:34 AM   Alcohol Use   Prescreen: >3 drinks/day or >7 drinks/week? No          No data to display              Do you have a current opioid prescription? No  Do you use any other controlled substances or medications that are not prescribed by a provider? None          -------------------------------------    Current providers sharing in care for this patient include:   Patient Care Team:  Hang Omalley MD as PCP - General (Family Practice)  Hang Omalley MD as Assigned PCP  Srini Felipe MD as Assigned Surgical Provider    The following health maintenance items are reviewed in Epic and correct as of today:  Health Maintenance   Topic Date Due    FALL RISK ASSESSMENT  09/02/2023    INFLUENZA VACCINE (1) 09/01/2023    COLORECTAL CANCER SCREENING  09/22/2023    EYE EXAM  08/01/2024    MEDICARE ANNUAL WELLNESS VISIT  08/16/2024    DTAP/TDAP/TD IMMUNIZATION (3 - Td or Tdap) 08/25/2027    LIPID  08/29/2027    ADVANCE CARE PLANNING  09/02/2027    HEPATITIS C SCREENING  Completed    PHQ-2 (once per calendar year)  Completed    Pneumococcal Vaccine: 65+ Years  Completed    ZOSTER IMMUNIZATION  Completed    AORTIC ANEURYSM SCREENING (SYSTEM ASSIGNED)  Completed    COVID-19 Vaccine  Completed    IPV IMMUNIZATION  Aged Out    MENINGITIS IMMUNIZATION  Aged Out     Lab work is in process          Review of Systems   Constitutional:  Negative for chills and fever.   HENT:  Negative for congestion, ear pain, hearing loss and sore throat.    Eyes:  Negative for pain and visual disturbance.   Respiratory:   "Negative for cough and shortness of breath.    Cardiovascular:  Negative for chest pain, palpitations and peripheral edema.   Gastrointestinal:  Negative for abdominal pain, constipation, diarrhea, heartburn, hematochezia and nausea.   Genitourinary:  Negative for dysuria, frequency, genital sores, hematuria, impotence, penile discharge and urgency.   Musculoskeletal:  Negative for arthralgias, joint swelling and myalgias.   Skin:  Negative for rash.   Neurological:  Negative for dizziness, weakness, headaches and paresthesias.   Psychiatric/Behavioral:  Negative for mood changes. The patient is not nervous/anxious.    Constitutional, HEENT, cardiovascular, pulmonary, GI, , musculoskeletal, neuro, skin, endocrine and psych systems are negative, except as otherwise noted.    OBJECTIVE:   There were no vitals taken for this visit. Estimated body mass index is 30.3 kg/m  as calculated from the following:    Height as of 5/1/23: 1.753 m (5' 9\").    Weight as of 5/1/23: 93.1 kg (205 lb 3.2 oz).  Physical Exam  GENERAL: healthy, alert and no distress  EYES: Eyes grossly normal to inspection, PERRL and conjunctivae and sclerae normal  HENT: ear canals and TM's normal, nose and mouth without ulcers or lesions  NECK: no adenopathy, no asymmetry, masses, or scars and thyroid normal to palpation  RESP: lungs clear to auscultation - no rales, rhonchi or wheezes  CV: regular rate and rhythm, normal S1 S2, no S3 or S4, no murmur, click or rub, no peripheral edema and peripheral pulses strong  ABDOMEN: soft, nontender, no hepatosplenomegaly, no masses and bowel sounds normal  MS: no gross musculoskeletal defects noted, no edema  SKIN: no suspicious lesions or rashes  NEURO: Normal strength and tone, mentation intact and speech normal  PSYCH: mentation appears normal, affect normal/bright    Diagnostic Test Results:  Labs reviewed in Epic  No results found for this or any previous visit (from the past 24 hour(s)).    ASSESSMENT / " "PLAN:   Encounter for Medicare annual wellness exam  - discussed preventative guidelines, healthy diet, exercise and weight management    Dyslipidemia  stable/controlled. Cont current medication(s) and treatment  - Lipid Profile ([facility])  - VENOUS COLLECTION    Prediabetes  Recheck, reduce sugar intake   - Hemoglobin A1c; Future  - Hemoglobin A1c  - VENOUS COLLECTION    History of prostate cancer  Recheck PSA  - Prostate Specific Antigen Screen; Future  - Prostate Specific Antigen Screen  - VENOUS COLLECTION      Patient has been advised of split billing requirements and indicates understanding: Yes      COUNSELING:  Reviewed preventive health counseling, as reflected in patient instructions       Regular exercise       Healthy diet/nutrition      BMI:   Estimated body mass index is 30.3 kg/m  as calculated from the following:    Height as of 5/1/23: 1.753 m (5' 9\").    Weight as of 5/1/23: 93.1 kg (205 lb 3.2 oz).         He reports that he has never smoked. He has never used smokeless tobacco.      Appropriate preventive services were discussed with this patient, including applicable screening as appropriate for cardiovascular disease, diabetes, osteopenia/osteoporosis, and glaucoma.  As appropriate for age/gender, discussed screening for colorectal cancer, prostate cancer, breast cancer, and cervical cancer. Checklist reviewing preventive services available has been given to the patient.    Reviewed patients plan of care and provided an AVS. The Basic Care Plan (routine screening as documented in Health Maintenance) for Daniel meets the Care Plan requirement. This Care Plan has been established and reviewed with the Patient.          Hang Omalley MD  [facility]    Identified Health Risks:  I have reviewed Opioid Use Disorder and Substance Use Disorder risk factors and made any needed referrals.   "

## 2023-08-15 NOTE — PATIENT INSTRUCTIONS
Patient Education   Personalized Prevention Plan  You are due for the preventive services outlined below.  Your care team is available to assist you in scheduling these services.  If you have already completed any of these items, please share that information with your care team to update in your medical record.  Health Maintenance Due   Topic Date Due     FALL RISK ASSESSMENT  09/02/2023

## 2023-08-16 ENCOUNTER — OFFICE VISIT (OUTPATIENT)
Dept: FAMILY MEDICINE | Facility: CLINIC | Age: 78
End: 2023-08-16

## 2023-08-16 VITALS
OXYGEN SATURATION: 98 % | WEIGHT: 208.6 LBS | HEIGHT: 69 IN | SYSTOLIC BLOOD PRESSURE: 130 MMHG | BODY MASS INDEX: 30.9 KG/M2 | DIASTOLIC BLOOD PRESSURE: 82 MMHG | HEART RATE: 50 BPM

## 2023-08-16 DIAGNOSIS — Z85.46 HISTORY OF PROSTATE CANCER: ICD-10-CM

## 2023-08-16 DIAGNOSIS — R73.03 PREDIABETES: ICD-10-CM

## 2023-08-16 DIAGNOSIS — E78.5 DYSLIPIDEMIA: ICD-10-CM

## 2023-08-16 DIAGNOSIS — Z00.00 ENCOUNTER FOR MEDICARE ANNUAL WELLNESS EXAM: Primary | ICD-10-CM

## 2023-08-16 LAB
CHOLESTEROL: 128 MG/DL (ref 100–199)
FASTING?: YES
HBA1C MFR BLD: 6.1 %
HDL (RMG): 47 MG/DL (ref 40–?)
LDL CALCULATED (RMG): 71 MG/DL (ref 0–130)
PSA SERPL DL<=0.01 NG/ML-MCNC: <0.01 NG/ML (ref 0–6.5)
TRIGLYCERIDES (RMG): 49 MG/DL (ref 0–149)

## 2023-08-16 PROCEDURE — G0103 PSA SCREENING: HCPCS | Performed by: FAMILY MEDICINE

## 2023-08-16 PROCEDURE — G0439 PPPS, SUBSEQ VISIT: HCPCS | Performed by: FAMILY MEDICINE

## 2023-08-16 PROCEDURE — 36415 COLL VENOUS BLD VENIPUNCTURE: CPT | Performed by: FAMILY MEDICINE

## 2023-08-16 PROCEDURE — 83036 HEMOGLOBIN GLYCOSYLATED A1C: CPT | Performed by: FAMILY MEDICINE

## 2023-08-16 PROCEDURE — 80061 LIPID PANEL: CPT | Mod: QW | Performed by: FAMILY MEDICINE

## 2023-08-16 PROCEDURE — 99214 OFFICE O/P EST MOD 30 MIN: CPT | Mod: 25 | Performed by: FAMILY MEDICINE

## 2023-10-03 ENCOUNTER — TRANSFERRED RECORDS (OUTPATIENT)
Dept: FAMILY MEDICINE | Facility: CLINIC | Age: 78
End: 2023-10-03

## 2023-10-05 ENCOUNTER — TRANSFERRED RECORDS (OUTPATIENT)
Dept: FAMILY MEDICINE | Facility: CLINIC | Age: 78
End: 2023-10-05

## 2023-10-12 ENCOUNTER — TRANSFERRED RECORDS (OUTPATIENT)
Dept: FAMILY MEDICINE | Facility: CLINIC | Age: 78
End: 2023-10-12

## 2024-03-04 NOTE — TELEPHONE ENCOUNTER
sildenafil (REVATIO) 20 MG   LAST  Med check 5/3/18   last labs(for RX) 8/18/17  Next  appt scheduled =  none  Jina Amin MA       see mdm

## 2024-03-18 ENCOUNTER — TELEPHONE (OUTPATIENT)
Dept: FAMILY MEDICINE | Facility: CLINIC | Age: 79
End: 2024-03-18

## 2024-03-18 DIAGNOSIS — J06.9 URI (UPPER RESPIRATORY INFECTION): Primary | ICD-10-CM

## 2024-03-18 RX ORDER — AZITHROMYCIN 250 MG/1
TABLET, FILM COATED ORAL
Qty: 6 TABLET | Refills: 0 | Status: SHIPPED | OUTPATIENT
Start: 2024-03-18 | End: 2024-03-23

## 2024-03-18 NOTE — TELEPHONE ENCOUNTER
Patient called clinic requesting prescription for abx for travel. He is concerned about developing a respiratory infection while traveling in [place]. Per Dr Omalley prescription sent to pharmacy for Zpak. Patient advised OK to take if respiratory symptoms last 7+ days, patient agrees. Stephanie Ayala

## 2024-03-19 ENCOUNTER — TELEPHONE (OUTPATIENT)
Dept: OPTOMETRY | Facility: CLINIC | Age: 79
End: 2024-03-19
Payer: COMMERCIAL

## 2024-03-19 NOTE — TELEPHONE ENCOUNTER
[facility] Call Center    Phone Message    May a detailed message be left on voicemail: yes     Reason for Call: Other: Pt states he got glasses in August last year. He says his Rt eye has seemed blurry with the glasses since then. He thought it would improve but it hasn't. Pt is going out of the country for a couple months in a couple weeks and would like to be seen prior to that to have a glasses check. Please call pt to discuss. Thank you.     Action Taken: Message routed to:  Other: [facility]    Travel Screening: Not Applicable

## 2024-03-20 NOTE — TELEPHONE ENCOUNTER
[facility] Call Center    Phone Message    May a detailed message be left on voicemail: yes     Reason for Call: Other: Pt is returning 2 missed calls from clinic per being seen before he leaves the country please contact pt to discuss. Pt advise a msg with information and appt time date can be left on his Voicemail     Action Taken: Message routed to:  [facility]    Travel Screening: Not Applicable

## 2024-03-21 ENCOUNTER — OFFICE VISIT (OUTPATIENT)
Dept: OPTOMETRY | Facility: CLINIC | Age: 79
End: 2024-03-21
Payer: COMMERCIAL

## 2024-03-21 DIAGNOSIS — H52.4 PRESBYOPIA: ICD-10-CM

## 2024-03-21 DIAGNOSIS — H52.223 REGULAR ASTIGMATISM OF BOTH EYES: ICD-10-CM

## 2024-03-21 DIAGNOSIS — H52.13 MYOPIA OF BOTH EYES: Primary | ICD-10-CM

## 2024-03-21 PROCEDURE — 99207 PR NO CHARGE LOS: CPT | Performed by: OPTOMETRIST

## 2024-03-21 ASSESSMENT — VISUAL ACUITY
OS_CC: 20/40+2
METHOD: SNELLEN - LINEAR
OD_CC: 20/150
OD_CC: 20/30-2
OD_CC: 20/50
CORRECTION_TYPE: GLASSES
OD_CC: 20/30-1
OS_CC: 20/30
CORRECTION_TYPE: GLASSES
METHOD: SNELLEN - LINEAR
OS_CC: 20/30+1
METHOD: SNELLEN - LINEAR
CORRECTION_TYPE: GLASSES
OS_CC: 20/25
OS_CC+: -1

## 2024-03-21 ASSESSMENT — REFRACTION_WEARINGRX
OS_CYLINDER: +0.25
OS_SPHERE: -1.75
OD_ADD: +2.50
OS_AXIS: 119
OD_CYLINDER: +1.75
OS_CYLINDER: SPHERE
OD_SPHERE: PLANO
OS_ADD: +2.50
OD_CYLINDER: +1.25
OS_SPHERE: -0.25
OD_SPHERE: +2.00
SPECS_TYPE: PAL
OD_CYLINDER: +1.25
OD_AXIS: 014
OD_AXIS: 020
OD_AXIS: 017
SPECS_TYPE: SVL READING
OS_ADD: +2.25
OS_CYLINDER: +1.00
OS_AXIS: 108
OD_ADD: +2.25
OS_SPHERE: +2.00
SPECS_TYPE: PAL
OD_SPHERE: -3.00

## 2024-03-21 ASSESSMENT — REFRACTION_MANIFEST
OD_SPHERE: -3.75
OS_CYLINDER: +1.00
OS_SPHERE: -1.75
OD_AXIS: 011
OS_ADD: +2.50
OD_ADD: +2.50
OD_CYLINDER: +1.75
OS_AXIS: 107

## 2024-03-21 ASSESSMENT — CONF VISUAL FIELD
OD_INFERIOR_NASAL_RESTRICTION: 0
OS_SUPERIOR_NASAL_RESTRICTION: 0
OS_INFERIOR_NASAL_RESTRICTION: 0
OS_NORMAL: 1
OD_SUPERIOR_NASAL_RESTRICTION: 0
METHOD: COUNTING FINGERS
OS_INFERIOR_TEMPORAL_RESTRICTION: 0
OD_INFERIOR_TEMPORAL_RESTRICTION: 0
OS_SUPERIOR_TEMPORAL_RESTRICTION: 0
OD_SUPERIOR_TEMPORAL_RESTRICTION: 0
OD_NORMAL: 1

## 2024-03-21 NOTE — LETTER
3/21/2024         RE: Daniel Samayoa  [address]  [facility] [address]        Dear Colleague,    Thank you for referring your patient, Daniel Samayoa, to the [facility]. Please see a copy of my visit note below.    Chief Complaint   Patient presents with     Blurred Vision Evaluation       Source of glasses: [employer] - purchased 8/2023 - did bring back to have checked   How long have you tried the glasses: 7 months - has been wearing full time   What is not working with glasses: Distance vision in right eye has not been clear in new glasses since he got them - thought he would adapt but it has not improved. Reading seems okay.     Left eye is clear.     Paulette Florez  Optometry Assistant      OBJECTIVE: See Ophthalmology exam    ASSESSMENT:    ICD-10-CM    1. Myopia of both eyes  H52.13       2. Regular astigmatism of both eyes  H52.223       3. Presbyopia  H52.4         PLAN:  Patient Instructions   Updated glasses prescription provided today.   Recommend re-make of right lens.     Return for comprehensive eye exam in ~5 months.     Min Kirkland OD  [facility]  [address]  [place]  [address]    [phone]         Again, thank you for allowing me to participate in the care of your patient.        Sincerely,        Min Kirkland OD

## 2024-03-21 NOTE — PATIENT INSTRUCTIONS
Updated glasses prescription provided today.   Recommend re-make of right lens.     Return for comprehensive eye exam in ~5 months.     Min Kirkland OD  [address]  [place]  [address] [phone]

## 2024-05-07 ENCOUNTER — OFFICE VISIT (OUTPATIENT)
Dept: FAMILY MEDICINE | Facility: CLINIC | Age: 79
End: 2024-05-07

## 2024-05-07 VITALS
BODY MASS INDEX: 30.66 KG/M2 | TEMPERATURE: 98.4 F | HEART RATE: 74 BPM | DIASTOLIC BLOOD PRESSURE: 96 MMHG | WEIGHT: 207 LBS | HEIGHT: 69 IN | OXYGEN SATURATION: 96 % | SYSTOLIC BLOOD PRESSURE: 139 MMHG

## 2024-05-07 DIAGNOSIS — R05.1 ACUTE COUGH: Primary | ICD-10-CM

## 2024-05-07 PROCEDURE — 99213 OFFICE O/P EST LOW 20 MIN: CPT | Performed by: FAMILY MEDICINE

## 2024-05-07 ASSESSMENT — PATIENT HEALTH QUESTIONNAIRE - PHQ9
10. IF YOU CHECKED OFF ANY PROBLEMS, HOW DIFFICULT HAVE THESE PROBLEMS MADE IT FOR YOU TO DO YOUR WORK, TAKE CARE OF THINGS AT HOME, OR GET ALONG WITH OTHER PEOPLE: NOT DIFFICULT AT ALL
SUM OF ALL RESPONSES TO PHQ QUESTIONS 1-9: 0
SUM OF ALL RESPONSES TO PHQ QUESTIONS 1-9: 0

## 2024-05-07 ASSESSMENT — ANXIETY QUESTIONNAIRES
1. FEELING NERVOUS, ANXIOUS, OR ON EDGE: NOT AT ALL
2. NOT BEING ABLE TO STOP OR CONTROL WORRYING: NOT AT ALL
GAD7 TOTAL SCORE: 0
7. FEELING AFRAID AS IF SOMETHING AWFUL MIGHT HAPPEN: NOT AT ALL
5. BEING SO RESTLESS THAT IT IS HARD TO SIT STILL: NOT AT ALL
3. WORRYING TOO MUCH ABOUT DIFFERENT THINGS: NOT AT ALL
8. IF YOU CHECKED OFF ANY PROBLEMS, HOW DIFFICULT HAVE THESE MADE IT FOR YOU TO DO YOUR WORK, TAKE CARE OF THINGS AT HOME, OR GET ALONG WITH OTHER PEOPLE?: NOT DIFFICULT AT ALL
7. FEELING AFRAID AS IF SOMETHING AWFUL MIGHT HAPPEN: NOT AT ALL
GAD7 TOTAL SCORE: 0
GAD7 TOTAL SCORE: 0
6. BECOMING EASILY ANNOYED OR IRRITABLE: NOT AT ALL
IF YOU CHECKED OFF ANY PROBLEMS ON THIS QUESTIONNAIRE, HOW DIFFICULT HAVE THESE PROBLEMS MADE IT FOR YOU TO DO YOUR WORK, TAKE CARE OF THINGS AT HOME, OR GET ALONG WITH OTHER PEOPLE: NOT DIFFICULT AT ALL
4. TROUBLE RELAXING: NOT AT ALL

## 2024-05-07 NOTE — PROGRESS NOTES
"History of Present Illness:  Daniel Samayoa is a 78 year old male here for evaluation.  Developed cough and chest congestion 2-3 weeks ago.  He had leftover azithromycin and decided to take it.  His cough improved.  However, he still feels some chest congestion especially when lying down.  No fever, chest pain, SOB, wheezing        Review of Systems:    A 12 point ROS was completed and is as noted in HPI and otherwise negative    Objective     Physical Exam:  Vital Signs:  BP (!) 139/96   Pulse 74   Temp 98.4  F (36.9  C) (Oral)   Ht 1.753 m (5' 9\")   Wt 93.9 kg (207 lb)   SpO2 96%   BMI 30.57 kg/m      General: Appears mildly ill,  He appears non toxic.  HEENT: Head normocephalic and atraumatic  Eyes Normal, conjunctiva normal without injection.   Nose: Nares clear. No sinus tenderness  Throat:  Clear no peritonsillar masses or swelling.  Neck: Supple, no cervical lymphadenopathy, no meningeal signs.  Chest: Lungs clear to auscultation bilaterally without wheezes, rhonchi or crackles  Heart: normal S1, S2.  No murmurs, rubs or gallops  Skin appears normal without rashes.        Assessment     Impression:    1. Acute cough        Plan     Reassuring history and exam.  Lungs clear.  Advised further monitoring and supportive cares.  If not improving or worse will obtain CXR    Answers submitted by the patient for this visit:  Patient Health Questionnaire (Submitted on 5/7/2024)  If you checked off any problems, how difficult have these problems made it for you to do your work, take care of things at home, or get along with other people?: Not difficult at all  PHQ9 TOTAL SCORE: 0  MATI-7 (Submitted on 5/7/2024)  MATI 7 TOTAL SCORE: 0  General Questionnaire (Submitted on 5/7/2024)  Chief Complaint: Chronic problems general questions HPI Form  How many servings of fruits and vegetables do you eat daily?: 4 or more  On average, how many sweetened beverages do you drink each day (Examples: soda, juice, sweet tea, " etc.  Do NOT count diet or artificially sweetened beverages)?: 2  How many minutes a day do you exercise enough to make your heart beat faster?: 60 or more  How many days a week do you exercise enough to make your heart beat faster?: 5  How many days per week do you miss taking your medication?: 0  General Concern (Submitted on 5/7/2024)  Chief Complaint: Chronic problems general questions HPI Form  What is the reason for your visit today?: Cough with mucous  When did your symptoms begin?: 1-2 weeks ago  How would you describe these symptoms?: Moderate  Are your symptoms:: Improving  Have you had these symptoms before?: No  Is there anything that makes you feel worse?: No  Is there anything that makes you feel better?: I took Azithromycin 250 mg for five days i am done with it four days ago

## 2024-06-12 DIAGNOSIS — K21.9 GASTROESOPHAGEAL REFLUX DISEASE WITHOUT ESOPHAGITIS: ICD-10-CM

## 2024-06-12 NOTE — TELEPHONE ENCOUNTER
Med: omeprazole    LOV (related): 8/16/23-cpx      Due for F/U around:  due for CPX      Next Appt: No current future appointments scheduled

## 2024-08-26 ENCOUNTER — OFFICE VISIT (OUTPATIENT)
Dept: OPTOMETRY | Facility: CLINIC | Age: 79
End: 2024-08-26
Payer: COMMERCIAL

## 2024-08-26 DIAGNOSIS — H43.812 PVD (POSTERIOR VITREOUS DETACHMENT), LEFT: ICD-10-CM

## 2024-08-26 DIAGNOSIS — Z01.01 ENCOUNTER FOR EXAMINATION OF EYES AND VISION WITH ABNORMAL FINDINGS: Primary | ICD-10-CM

## 2024-08-26 DIAGNOSIS — H25.13 NUCLEAR AGE-RELATED CATARACT, BOTH EYES: ICD-10-CM

## 2024-08-26 DIAGNOSIS — Z98.890 S/P LASIK SURGERY: ICD-10-CM

## 2024-08-26 DIAGNOSIS — H52.13 MYOPIA OF BOTH EYES: ICD-10-CM

## 2024-08-26 DIAGNOSIS — H52.4 PRESBYOPIA: ICD-10-CM

## 2024-08-26 DIAGNOSIS — H52.223 REGULAR ASTIGMATISM OF BOTH EYES: ICD-10-CM

## 2024-08-26 PROCEDURE — 92014 COMPRE OPH EXAM EST PT 1/>: CPT | Performed by: OPTOMETRIST

## 2024-08-26 PROCEDURE — 92015 DETERMINE REFRACTIVE STATE: CPT | Performed by: OPTOMETRIST

## 2024-08-26 ASSESSMENT — REFRACTION_MANIFEST
OS_SPHERE: -2.00
OS_ADD: +2.50
OD_CYLINDER: +2.00
OD_ADD: +2.50
OS_CYLINDER: +1.00
OD_SPHERE: -4.00
OD_AXIS: 009
OS_AXIS: 111

## 2024-08-26 ASSESSMENT — VISUAL ACUITY
CORRECTION_TYPE: GLASSES
OD_CC+: +2
CORRECTION_TYPE: GLASSES
METHOD: SNELLEN - LINEAR
METHOD: SNELLEN - LINEAR
OD_SC: 20/80-1
OS_CC+: -1
OS_SC: 20/125
OD_SC: 20/300
OS_SC: 20/120+1
OD_CC: 20/30
OD_CC: 20/40
OS_CC: 20/25
OD_CC: 20/20-2
OS_CC: 20/20-1

## 2024-08-26 ASSESSMENT — REFRACTION_WEARINGRX
OD_CYLINDER: +1.75
OD_CYLINDER: +1.25
OS_CYLINDER: +1.00
OS_SPHERE: +2.00
SPECS_TYPE: SVL READING
OS_AXIS: 107
OD_AXIS: 017
OS_CYLINDER: +0.25
OD_ADD: +2.50
OS_ADD: +2.50
OS_SPHERE: -1.75
OD_SPHERE: PLANO
OD_AXIS: 011
OD_SPHERE: -3.75
SPECS_TYPE: PAL
OS_AXIS: 119

## 2024-08-26 ASSESSMENT — SLIT LAMP EXAM - LIDS
COMMENTS: NORMAL
COMMENTS: NORMAL

## 2024-08-26 ASSESSMENT — CONF VISUAL FIELD
METHOD: COUNTING FINGERS
OS_NORMAL: 1
OS_INFERIOR_NASAL_RESTRICTION: 0
OD_SUPERIOR_TEMPORAL_RESTRICTION: 0
OD_INFERIOR_TEMPORAL_RESTRICTION: 0
OD_NORMAL: 1
OD_INFERIOR_NASAL_RESTRICTION: 0
OS_SUPERIOR_NASAL_RESTRICTION: 0
OS_SUPERIOR_TEMPORAL_RESTRICTION: 0
OS_INFERIOR_TEMPORAL_RESTRICTION: 0
OD_SUPERIOR_NASAL_RESTRICTION: 0

## 2024-08-26 ASSESSMENT — TONOMETRY
OD_IOP_MMHG: 18
IOP_METHOD: APPLANATION
OS_IOP_MMHG: 15

## 2024-08-26 ASSESSMENT — EXTERNAL EXAM - LEFT EYE: OS_EXAM: NORMAL

## 2024-08-26 ASSESSMENT — CUP TO DISC RATIO
OS_RATIO: 0.25
OD_RATIO: 0.25

## 2024-08-26 ASSESSMENT — EXTERNAL EXAM - RIGHT EYE: OD_EXAM: NORMAL

## 2024-08-26 NOTE — PROGRESS NOTES
Chief Complaint   Patient presents with    Diabetic Eye Exam    Cataract        Chief Complaint(s) and History of Present Illness(es)       Diabetic Eye Exam              Diabetes Type: Type 2 and controlled with diet    Duration: years    Blood Sugars: is controlled (Checks ~1x/month)              Cataract              Laterality: both eyes                   Lab Results   Component Value Date    A1C 6.1 08/16/2023    A1C 6.2 08/29/2022    A1C 5.8 06/15/2021    A1C 5.8 08/24/2020    A1C 5.9 08/22/2019    A1C 5.9 05/03/2018     -Cataracts each eye     -S/P Lasik left eye 2009(?)     Last Eye Exam: 8/1/2023   Dilated Previously: Yes, side effects of dilation explained today     What are you currently using to see?  Glasses - PAL's - wears full time, also has SVL reading glasses if he is sitting down to read for extended time     Distance Vision Acuity: Noticed gradual change in both eyes    Near Vision Acuity: Not satisfied     Eye Comfort: good - burning while exercising     Do you use eye drops? : Yes: OTC eye wash few times per month  Occupation or Hobbies: Retired - Volunteer at [employer]    Paulette Florez  Optometry Assistant     Medical, surgical and family histories reviewed and updated 8/26/2024.       OBJECTIVE: See Ophthalmology exam    ASSESSMENT:    ICD-10-CM    1. Encounter for examination of eyes and vision with abnormal findings  Z01.01       2. Nuclear age-related cataract, both eyes  H25.13       3. PVD (posterior vitreous detachment), left  H43.812       4. S/P LASIK surgery - Left Eye  Z98.890       5. Myopia of both eyes  H52.13       6. Regular astigmatism of both eyes  H52.223       7. Presbyopia  H52.4           PLAN:    Daniel Samayoa aware  eye exam results will be sent to Hang Omalley.  Patient Instructions   You have the start of mild cataracts.  You may notice some blurred vision or glare with night driving.  It is important that you wear good sunglasses to  "protect your eyes from the ultraviolet light from the sun.     You have a PVD- posterior vitreous detachment which is due to the gel of the eye shrinking and clumping together.  This can sometimes cause holes or tears in the retina.  The signs of a retinal detachment are flashes of light or a \"curtain veil\" coming over your vision. If you notice any of these changes return to clinic for re-evaluation.     Updated glasses prescription provided today. Optional to fill.     Return in 1 year for a comprehensive eye exam, or sooner if needed.      The effects of the dilating drops last for 4- 6 hours.  You will be more sensitive to light and vision will be blurry up close.  Mydriatic sunglasses were given if needed.     Min Kirkland, OD  [address]. [place]  [address]    [phone]         "

## 2024-08-26 NOTE — LETTER
8/26/2024      Daniel Samayoa  [address]  [facility] [address]      Dear Colleague,    Thank you for referring your patient, Daniel Samayoa[facility]. Please see a copy of my visit note below.    Chief Complaint   Patient presents with     Diabetic Eye Exam     Cataract        Chief Complaint(s) and History of Present Illness(es)       Diabetic Eye Exam              Diabetes Type: Type 2 and controlled with diet    Duration: years    Blood Sugars: is controlled (Checks ~1x/month)              Cataract              Laterality: both eyes                   Lab Results   Component Value Date    A1C 6.1 08/16/2023    A1C 6.2 08/29/2022    A1C 5.8 06/15/2021    A1C 5.8 08/24/2020    A1C 5.9 08/22/2019    A1C 5.9 05/03/2018     -Cataracts each eye     -S/P Lasik left eye 2009(?)     Last Eye Exam: 8/1/2023   Dilated Previously: Yes, side effects of dilation explained today     What are you currently using to see?  Glasses - PAL's - wears full time, also has SVL reading glasses if he is sitting down to read for extended time     Distance Vision Acuity: Noticed gradual change in both eyes    Near Vision Acuity: Not satisfied     Eye Comfort: good - burning while exercising     Do you use eye drops? 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       OBJECTIVE: See Ophthalmology exam    ASSESSMENT:    ICD-10-CM    1. Encounter for examination of eyes and vision with abnormal findings  Z01.01       2. Nuclear age-related cataract, both eyes  H25.13       3. PVD (posterior vitreous detachment), left  H43.812       4. S/P LASIK surgery - Left Eye  Z98.890       5. Myopia of both eyes  H52.13       6. Regular astigmatism of both eyes  H52.223       7. Presbyopia  H52.4           PLAN:    Daniel Samayoa  "aware  eye exam results will be sent to Hang Omalley.  Patient Instructions   You have the start of mild cataracts.  You may notice some blurred vision or glare with night driving.  It is important that you wear good sunglasses to protect your eyes from the ultraviolet light from the sun.     You have a PVD- posterior vitreous detachment which is due to the gel of the eye shrinking and clumping together.  This can sometimes cause holes or tears in the retina.  The signs of a retinal detachment are flashes of light or a \"curtain veil\" coming over your vision. If you notice any of these changes return to clinic for re-evaluation.     Updated glasses prescription provided today. Optional to fill.     Return in 1 year for a comprehensive eye exam, or sooner if needed.      The effects of the dilating drops last for 4- 6 hours.  You will be more sensitive to light and vision will be blurry up close.  Mydriatic sunglasses were given if needed.     Min Kirkland OD  [address]  [address] [phone]           Again, thank you for allowing me to participate in the care of your patient.        Sincerely,        Min Kirkland, LETICIA  "

## 2024-08-26 NOTE — PATIENT INSTRUCTIONS
"You have the start of mild cataracts.  You may notice some blurred vision or glare with night driving.  It is important that you wear good sunglasses to protect your eyes from the ultraviolet light from the sun.     You have a PVD- posterior vitreous detachment which is due to the gel of the eye shrinking and clumping together.  This can sometimes cause holes or tears in the retina.  The signs of a retinal detachment are flashes of light or a \"curtain veil\" coming over your vision. If you notice any of these changes return to clinic for re-evaluation.     Updated glasses prescription provided today. Optional to fill.     Return in 1 year for a comprehensive eye exam, or sooner if needed.      The effects of the dilating drops last for 4- 6 hours.  You will be more sensitive to light and vision will be blurry up close.  Mydriatic sunglasses were given if needed.     Min Kirkland, OD  [facility] [place]. [place]  [address]    [phone]   "

## 2024-10-08 DIAGNOSIS — E78.5 DYSLIPIDEMIA: ICD-10-CM

## 2024-10-08 RX ORDER — ROSUVASTATIN CALCIUM 5 MG/1
TABLET, COATED ORAL
Qty: 90 TABLET | Refills: 3 | Status: SHIPPED | OUTPATIENT
Start: 2024-10-08 | End: 2024-11-04

## 2024-10-08 NOTE — TELEPHONE ENCOUNTER
"Med: Rosuvastatin      LOV (related): 8/16/23-cpx    Last Lab: 8/16/23      Due for F/U around:  due for CPX      Next Appt: 11/05/24 (awv) with Hang Omalley           Cholesterol   Date Value Ref Range Status   08/16/2023 128 100 - 199 mg/dL Final   08/29/2022 128 100 - 199 mg/dl Final   08/25/2021 149 100 - 199 mg/dL Final   08/24/2020 143 100 - 199 mg/dL Final     HDL Cholesterol   Date Value Ref Range Status   08/25/2021 38 (L) >39 mg/dL Final   08/24/2020 56 >39 mg/dL Final     HDL   Date Value Ref Range Status   08/16/2023 47 40 mg/dL Final   08/29/2022 51 40 mg/dl Final     LDL Cholesterol Calculated   Date Value Ref Range Status   08/25/2021 88 0 - 99 mg/dL Final   08/24/2020 71 0 - 99 mg/dL Final     Comment:     **Effective August 31, 2020, [facility] is implementing an improved**  equation to calculate Low Density Lipoprotein Cholesterol (LDL-C)  concentrations, to be used in all lipid panels that report calculated  LDL-C. This equation was developed through a collaboration with the  National Heart, Lung and Blood Institutes of Health (NIH).[1] The NIH  calculation overcomes the limitations of the existing Friedewald  LDL-C equation and performs equally well in both fasting and  non-fasting individuals.  1. Adair M, Graciela C, Ramona Q, et al. A new equation for  calculation of low-density lipoprotein cholesterol in patients with  normolipidemia and/or hypertriglyceridemia. JUNITO Cardiol.  2020 Feb 26. doi:10.1001/jamacardio.2020.0013       LDL CALCULATED ([facility])   Date Value Ref Range Status   08/16/2023 71 0 - 130 mg/dL Final   08/29/2022 68 0 - 130 mg/dl Final     Triglycerides   Date Value Ref Range Status   08/16/2023 49 0 - 149 mg/dL Final   08/29/2022 48 0 - 149 mg/dl Final   08/25/2021 125 0 - 149 mg/dL Final   08/24/2020 79 0 - 149 mg/dL Final     No results found for: \"CHOLHDLRATIO\"     "

## 2024-10-15 ENCOUNTER — OFFICE VISIT (OUTPATIENT)
Dept: FAMILY MEDICINE | Facility: CLINIC | Age: 79
End: 2024-10-15

## 2024-10-15 VITALS
HEART RATE: 52 BPM | SYSTOLIC BLOOD PRESSURE: 140 MMHG | BODY MASS INDEX: 31.01 KG/M2 | DIASTOLIC BLOOD PRESSURE: 90 MMHG | WEIGHT: 210 LBS | OXYGEN SATURATION: 97 %

## 2024-10-15 DIAGNOSIS — H61.22 IMPACTED CERUMEN OF LEFT EAR: Primary | ICD-10-CM

## 2024-10-15 DIAGNOSIS — R03.0 ELEVATED BLOOD PRESSURE READING WITHOUT DIAGNOSIS OF HYPERTENSION: ICD-10-CM

## 2024-10-15 PROCEDURE — 99213 OFFICE O/P EST LOW 20 MIN: CPT | Mod: 25

## 2024-10-15 PROCEDURE — 69209 REMOVE IMPACTED EAR WAX UNI: CPT

## 2024-10-15 NOTE — PROGRESS NOTES
"  Assessment & Plan     Impacted cerumen of left ear  Ear ear wax was successfully removed from left ear canal. Patient tolerated procedure well. Discussed OTC debrox as needed. Follow up as needed for new or worsening symptoms. Patient agreeable to plan. All questions answered.     - [place] REMOVAL IMPACTED CERUMEN IRRIGATION/LVG UNILAT    Elevated blood pressure reading without diagnosis of hypertension  Blood pressure was elevated at today's visit. Recommendations include monitoring blood pressures over the next 1-2 weeks and if blood pressure continues to be elevated over 140/90 recommend follow up for a blood pressure management visit. Discussed bringing BP readings to upcoming physical. Discussed importance of a healthy weight, along with diet and exercise. Patient agreeable to plan.                BMI  Estimated body mass index is 31.01 kg/m  as calculated from the following:    Height as of 5/7/24: 1.753 m (5' 9\").    Weight as of this encounter: 95.3 kg (210 lb).   Weight management plan: Discussed healthy diet and exercise guidelines      Work on weight loss  Regular exercise  See Patient Instructions    Return if symptoms worsen or fail to improve, for Follow up.    Jon Landon is a 79 year old, presenting for the following health issues:  Ear Problem (Itching & pain L ear x 2 weeks  - did ear wash and helped a little but still itching/12/2019 similar issues - ended up seeing surgeon and said fungus in ear, got meds that helped) and Patient/info Update (CPX scheduled for 11/5 with GRETCHEN)    History of Present Illness       Reason for visit:  Ear itching  Symptom onset:  1-2 weeks ago  Symptom intensity:  Mild  Symptom progression:  Staying the same  Had these symptoms before:  Yes  Has tried/received treatment for these symptoms:  Yes  Previous treatment was successful:  Yes  Prior treatment description:  Anti fungus  What makes it worse:  No  What makes it better:  No   He is taking medications " regularly.         Concern - Ear problem  Onset: 2 week ago  Description: Left itching, very little drainage but hasn't noticed any. Washed ear with device. Feels better  Intensity: no pain  Progression of Symptoms:  waxing and waning, worsening   Accompanying Signs & Symptoms: decrease hearing in left with itching. No pain. No fevers.   Previous history of similar problem: had blockage with cerumen in the past and fungal ear problem  Precipitating factors:        Worsened by: water at the pool  Alleviating factors:        Improved by: washing it out   Therapies tried and outcome: washed it out      Elevated BP today. Not on medications.   BP Readings from Last 6 Encounters:   10/15/24 (!) 140/90   05/07/24 (!) 139/96   08/16/23 130/82   05/01/23 (!) 141/83   04/03/23 (!) 172/96   11/15/22 130/72            Review of Systems  Constitutional, HEENT, cardiovascular, pulmonary, gi and gu systems are negative, except as otherwise noted.      Objective    BP (!) 140/90   Pulse 52   Wt 95.3 kg (210 lb)   SpO2 97%   BMI 31.01 kg/m    Body mass index is 31.01 kg/m .  Physical Exam   GENERAL: alert and no distress  HENT: normal cephalic/atraumatic, right ear: normal: no effusions, no erythema, normal landmarks, left ear: occluded with wax, nose and mouth without ulcers or lesions, oropharynx clear, and oral mucous membranes moist  RESP: lungs clear to auscultation - no rales, rhonchi or wheezes  CV: regular rate and rhythm, normal S1 S2, no S3 or S4, no murmur, click or rub, no peripheral edema  PSYCH: mentation appears normal, affect normal/bright    Cerumenosis is noted.  Wax was removed by syringing and manual debridement. Otoscopy after lavage: Left canal WNL. Left TM without erythema, effusion or bulge. Patient tolerated well.           Signed Electronically by: ARNOLDO Sparks CNP

## 2024-10-15 NOTE — PATIENT INSTRUCTIONS
How to take home blood pressure:  Sit for 5 minutes with feet flat on the floor.  Apply cuff to upper arm (bicep area) and have this arm resting at about heart level.  Take blood pressure reading.  Make sure that the machine has a memory bank that records your readings or write readings down  If you remain elevated, greater than 140/90 then please return to clinic for further high blood pressure work up.       EARWAX (Treated)        Everyone produces earwax from the lining of the ear canal. It serves to lubricate and protect the ear. The wax that forms in the canal slowly moves toward the outside of the ear and falls out. Sometimes there will be a build-up of wax in the ear canal causing a blockage and loss of hearing. An ear wax buildup was removed from your ear today.  HOME CARE  Preventing Future Problems   If you have a tendency to build up wax in the ear canal, you should clear the wax at home on a regular basis (about once every six months ) before it causes discomfort.  Unless a prescription medicine was given, you may use an over-the-counter product made for clearing earwax (such as Debrox or Murine Earwax Drops). These contain carbamide peroxide and are available over-the-counter in a kit with a small bulb syringe.  To use: lie down with the blocked ear facing upward. Apply one dropper full of medicine and wait a few minutes. Wiggle the outer ear to get the solution to enter the canal.  Lean over a sink or basin with the blocked ear turned downward. Use a rubber bulb syringe filled with LUKEWARM water to rinse the ear several times. Use gentle pressure only. You may need to repeat the irrigation several times before the wax flows out.  If you are having trouble draining all of the water out of your ear canal after this procedure, you may put a few drops of rubbing alcohol into the ear canal. This will help evaporate the remaining water.  DO NOT  DO NOT use cold water to rinse the ear since this will make  you dizzy.  DO NOT perform this procedure if you have an ear infection (ear pain, fever, or fluid draining from the ear).  DO NOT perform this procedure if you have a punctured eardrum.  DO NOT use cotton applicators (Q-tips), matches, toothpicks, dre pins, keys or other objects to  clean  the ear canal. This can cause infection of the ear canal or rupture of the eardrum. Because of their size and shape, it is common for cotton applicators to push the ear wax deeper into the ear canal instead of removing it. This can make matters worse.  FOLLOW UP with your doctor or this facility as directed by our staff.  GET PROMPT MEDICAL ATTENTION if any of the following occur:  Worsening ear pain  Fever of 100.4 F (38 C) or higher, or as directed by your healthcare provider  Hearing does not return to normal after three days of treatment  Fluid drainage or bleeding from the ear canal  Swelling, redness or tenderness of the outer ear  Headache, neck pain or stiff neck

## 2024-10-29 SDOH — HEALTH STABILITY: PHYSICAL HEALTH: ON AVERAGE, HOW MANY MINUTES DO YOU ENGAGE IN EXERCISE AT THIS LEVEL?: 120 MIN

## 2024-10-29 SDOH — HEALTH STABILITY: PHYSICAL HEALTH: ON AVERAGE, HOW MANY DAYS PER WEEK DO YOU ENGAGE IN MODERATE TO STRENUOUS EXERCISE (LIKE A BRISK WALK)?: 6 DAYS

## 2024-10-29 ASSESSMENT — SOCIAL DETERMINANTS OF HEALTH (SDOH): HOW OFTEN DO YOU GET TOGETHER WITH FRIENDS OR RELATIVES?: MORE THAN THREE TIMES A WEEK

## 2024-11-04 NOTE — PROGRESS NOTES
Preventive Care Visit  [facility]  Hang Omalley MD, Family Medicine  Nov 5, 2024      Assessment & Plan     Encounter for Medicare annual wellness exam  - discussed preventative guidelines, healthy diet, exercise and weight management    Gastroesophageal reflux disease without esophagitis  stable/controlled. Cont current medication(s) and treatment  - omeprazole (PRILOSEC) 20 MG DR capsule; Take 1 capsule (20 mg) by mouth daily.    Dyslipidemia  Recheck, cont statin  - Lipid Profile ([facility])  - rosuvastatin (CRESTOR) 5 MG tablet; TAKE 1 TABLET BY MOUTH EVERYDAY AT BEDTIME    Prediabetes  Recheck  Cont diet and exercise  - Hemoglobin A1c; Future  - Hemoglobin A1c    Primary hypertension  New Dx  [facility]  Cont diet and exercise  Recheck with bmp in one month  - Basic metabolic panel; Future  - VENOUS COLLECTION  - irbesartan (AVAPRO) 75 MG tablet; Take 1 tablet (75 mg) by mouth at bedtime.  - Basic metabolic panel    Tendinopathy of left rotator cuff  Injection and HEP    History of prostate cancer    - PSA tumor marker; Future  - PSA tumor marker    Dermatitis of left ear canal    - fluocinolone acetonide oil 0.01 % ear drops; Place 0.25 mLs (5 drops) Into the left ear 2 times daily for 10 days.    Patient has been advised of split billing requirements and indicates understanding: Yes        Counseling  Appropriate preventive services were addressed with this patient via screening, questionnaire, or discussion as appropriate for fall prevention, nutrition, physical activity, Tobacco-use cessation, social engagement, weight loss and cognition.  Checklist reviewing preventive services available has been given to the patient.  Reviewed patient's diet, addressing concerns and/or questions.       See Patient Instructions    No follow-ups on file.    Subjective   Dipesh is a 79 year old, presenting for the following:  Physical (Fasting), Ear Problem (L ear still itchy after ear wash 3 weeks ago), and  Hypertension (BP readings have been elevated per pt's at home readings )            HPI    History of prostate cancer: s/p radical prostatectomy, PSA undetectable.       Prediabetes: stable, asymptomatic     HLD: doing well on statin    BP: increased lately.  140s-150s consistently.  Asymptomatic.    Left shoulder pain, worse at night.  No trauma.      Health Care Directive  Patient has a Health Care Directive on file  Advance care planning document is on file and is current.      10/29/2024   General Health   How would you rate your overall physical health? Good   Feel stress (tense, anxious, or unable to sleep) Not at all            10/29/2024   Nutrition   Diet: Regular (no restrictions)            10/29/2024   Exercise   Days per week of moderate/strenous exercise 6 days   Average minutes spent exercising at this level 120 min            10/29/2024   Social Factors   Frequency of gathering with friends or relatives More than three times a week   Worry food won't last until get money to buy more No   Food not last or not have enough money for food? No   Do you have housing? (Housing is defined as stable permanent housing and does not include staying ouside in a car, in a tent, in an abandoned building, in an overnight shelter, or couch-surfing.) Yes   Are you worried about losing your housing? No   Lack of transportation? No   Unable to get utilities (heat,electricity)? No            10/29/2024   Fall Risk   Fallen 2 or more times in the past year? No    Trouble with walking or balance? No        Patient-reported          10/29/2024   Activities of Daily Living- Home Safety   Needs help with the following daily activites None of the above   Safety concerns in the home None of the above            10/29/2024   Dental   Dentist two times every year? Yes            10/29/2024   Hearing Screening   Hearing concerns? None of the above     Hearing    Left:  500Hz: Pass  1000Hz: Pass  2000Hz: Pass  4000Hz:  Pass    Right:  500Hz: Pass  1000Hz: Pass  2000Hz: Pass  4000Hz: Pass'         10/29/2024   Driving Risk Screening   Patient/family members have concerns about driving No            10/29/2024   General Alertness/Fatigue Screening   Have you been more tired than usual lately? No            10/29/2024   Urinary Incontinence Screening   Bothered by leaking urine in past 6 months No                 Today's PHQ-2 Score:       11/5/2024     8:44 AM   PHQ-2 ( 1999 [employer])   Q1: Little interest or pleasure in doing things 0   Q2: Feeling down, depressed or hopeless 0   PHQ-2 Score 0         10/29/2024   Substance Use   Alcohol more than 3/day or more than 7/wk No   Do you have a current opioid prescription? No   How severe/bad is pain from 1 to 10? 0/10 (No Pain)   Do you use any other substances recreationally? No        Social History     Tobacco Use    Smoking status: Never    Smokeless tobacco: Never   Substance Use Topics    Alcohol use: Yes     Alcohol/week: 1.7 standard drinks of alcohol     Types: 2 Cans of beer per week     Comment: little    Drug use: No       ASCVD Risk   The ASCVD Risk score (Melanie BRIZUELA, et al., 2019) failed to calculate for the following reasons:    The valid total cholesterol range is 130 to 320 mg/dL            Reviewed and updated as needed this visit by Provider                    Lab work is in process  Current providers sharing in care for this patient include:  Patient Care Team:  Hang Omalley MD as PCP - General (Family Practice)  Hang Omalley MD as Assigned PCP  Min Kirkland OD as Assigned Surgical Provider  Srini Felipe MD as MD (Otolaryngology)    The following health maintenance items are reviewed in Epic and correct as of today:  Health Maintenance   Topic Date Due    BMP  05/01/2024    LIPID  08/16/2024    EYE EXAM  08/26/2025    MEDICARE ANNUAL WELLNESS VISIT  11/05/2025    FALL RISK ASSESSMENT  11/05/2025    GLUCOSE  05/01/2026     "COLORECTAL CANCER SCREENING  09/27/2026    DTAP/TDAP/TD IMMUNIZATION (3 - Td or Tdap) 08/25/2027    ADVANCE CARE PLANNING  08/16/2028    HEPATITIS C SCREENING  Completed    PHQ-2 (once per calendar year)  Completed    INFLUENZA VACCINE  Completed    Pneumococcal Vaccine: 65+ Years  Completed    ZOSTER IMMUNIZATION  Completed    RSV VACCINE  Completed    COVID-19 Vaccine  Completed    HPV IMMUNIZATION  Aged Out    MENINGITIS IMMUNIZATION  Aged Out    RSV MONOCLONAL ANTIBODY  Aged Out         Review of Systems  Constitutional, HEENT, cardiovascular, pulmonary, GI, , musculoskeletal, neuro, skin, endocrine and psych systems are negative, except as otherwise noted.     Objective    Exam  BP (!) 157/80   Pulse 56   Wt 92.5 kg (204 lb)   SpO2 99%   BMI 30.13 kg/m     Estimated body mass index is 30.13 kg/m  as calculated from the following:    Height as of 5/7/24: 1.753 m (5' 9\").    Weight as of this encounter: 92.5 kg (204 lb).    Physical Exam  GENERAL: alert and no distress  EYES: Eyes grossly normal to inspection, PERRL and conjunctivae and sclerae normal  HENT: ear canals and TM's normal, nose and mouth without ulcers or lesions  NECK: no adenopathy, no asymmetry, masses, or scars  RESP: lungs clear to auscultation - no rales, rhonchi or wheezes  CV: regular rate and rhythm, normal S1 S2, no S3 or S4, no murmur, click or rub, no peripheral edema  ABDOMEN: soft, nontender, no hepatosplenomegaly, no masses and bowel sounds normal  MS: no gross musculoskeletal defects noted, no edema  Shoulder with full ROM  RTC testing normal strength  +neer and rodriguez  SKIN: no suspicious lesions or rashes  NEURO: Normal strength and tone, mentation intact and speech normal  PSYCH: mentation appears normal, affect normal/bright    The potential risks of the procedure were discussed and understood.  Verbal consent was obtained.  All questions were answered to the best of my ability.  With the patient in the seated position, the " area was cleaned with alcohol.  Topical anesthesia with ethyl chloride was used.  Using a 25G 1.5 inch needle the subacromial space was successfully entered using an posterolateral approach.  A total of 4 cc of 1% lidocaine without epinephrine, 4 cc of 0.25% bupivacaine and 40 mg of Kenalog were injected.  The patient tolerated the procedure well.  There were no immediate complications.  Post-procedure evaluation demonstrated improvement of pain.  Anticipatory guidance regarding the injection was given and understood.  Red flags were discussed.  All questions were answered when able.            11/5/2024   Mini Cog   Clock Draw Score 2 Normal   3 Item Recall 3 objects recalled   Mini Cog Total Score 5                 Signed Electronically by: Hang Omalley MD

## 2024-11-04 NOTE — PATIENT INSTRUCTIONS
Patient Education   Preventive Care Advice   This is general advice given by our system to help you stay healthy. However, your care team may have specific advice just for you. Please talk to your care team about your preventive care needs.  Nutrition  Eat 5 or more servings of fruits and vegetables each day.  Try wheat bread, brown rice and whole grain pasta (instead of white bread, rice, and pasta).  Get enough calcium and vitamin D. Check the label on foods and aim for 100% of the RDA (recommended daily allowance).  Lifestyle  Exercise at least 150 minutes each week  (30 minutes a day, 5 days a week).  Do muscle strengthening activities 2 days a week. These help control your weight and prevent disease.  No smoking.  Wear sunscreen to prevent skin cancer.  Have a dental exam and cleaning every 6 months.  Yearly exams  See your health care team every year to talk about:  Any changes in your health.  Any medicines your care team has prescribed.  Preventive care, family planning, and ways to prevent chronic diseases.  Shots (vaccines)   HPV shots (up to age 26), if you've never had them before.  Hepatitis B shots (up to age 59), if you've never had them before.  COVID-19 shot: Get this shot when it's due.  Flu shot: Get a flu shot every year.  Tetanus shot: Get a tetanus shot every 10 years.  Pneumococcal, hepatitis A, and RSV shots: Ask your care team if you need these based on your risk.  Shingles shot (for age 50 and up)  General health tests  Diabetes screening:  Starting at age 35, Get screened for diabetes at least every 3 years.  If you are younger than age 35, ask your care team if you should be screened for diabetes.  Cholesterol test: At age 39, start having a cholesterol test every 5 years, or more often if advised.  Bone density scan (DEXA): At age 50, ask your care team if you should have this scan for osteoporosis (brittle bones).  Hepatitis C: Get tested at least once in your life.  STIs (sexually  transmitted infections)  Before age 24: Ask your care team if you should be screened for STIs.  After age 24: Get screened for STIs if you're at risk. You are at risk for STIs (including HIV) if:  You are sexually active with more than one person.  You don't use condoms every time.  You or a partner was diagnosed with a sexually transmitted infection.  If you are at risk for HIV, ask about PrEP medicine to prevent HIV.  Get tested for HIV at least once in your life, whether you are at risk for HIV or not.  Cancer screening tests  Cervical cancer screening: If you have a cervix, begin getting regular cervical cancer screening tests starting at age 21.  Breast cancer scan (mammogram): If you've ever had breasts, begin having regular mammograms starting at age 40. This is a scan to check for breast cancer.  Colon cancer screening: It is important to start screening for colon cancer at age 45.  Have a colonoscopy test every 10 years (or more often if you're at risk) Or, ask your provider about stool tests like a FIT test every year or Cologuard test every 3 years.  To learn more about your testing options, visit:   .  For help making a decision, visit:   https://bit.ly/hh94720.  Prostate cancer screening test: If you have a prostate, ask your care team if a prostate cancer screening test (PSA) at age 55 is right for you.  Lung cancer screening: If you are a current or former smoker ages 50 to 80, ask your care team if ongoing lung cancer screenings are right for you.  For informational purposes only. Not to replace the advice of your health care provider. Copyright   2023 [employer]. All rights reserved. Clinically reviewed by the [facility]. [employer] 855376 - REV 01/24.

## 2024-11-05 ENCOUNTER — OFFICE VISIT (OUTPATIENT)
Dept: FAMILY MEDICINE | Facility: CLINIC | Age: 79
End: 2024-11-05

## 2024-11-05 VITALS
HEART RATE: 56 BPM | BODY MASS INDEX: 30.13 KG/M2 | WEIGHT: 204 LBS | SYSTOLIC BLOOD PRESSURE: 157 MMHG | DIASTOLIC BLOOD PRESSURE: 80 MMHG | OXYGEN SATURATION: 99 %

## 2024-11-05 DIAGNOSIS — E78.5 DYSLIPIDEMIA: ICD-10-CM

## 2024-11-05 DIAGNOSIS — H60.542 DERMATITIS OF LEFT EAR CANAL: ICD-10-CM

## 2024-11-05 DIAGNOSIS — Z85.46 HISTORY OF PROSTATE CANCER: ICD-10-CM

## 2024-11-05 DIAGNOSIS — M67.912 TENDINOPATHY OF LEFT ROTATOR CUFF: ICD-10-CM

## 2024-11-05 DIAGNOSIS — K21.9 GASTROESOPHAGEAL REFLUX DISEASE WITHOUT ESOPHAGITIS: ICD-10-CM

## 2024-11-05 DIAGNOSIS — R73.03 PREDIABETES: ICD-10-CM

## 2024-11-05 DIAGNOSIS — Z00.00 ENCOUNTER FOR MEDICARE ANNUAL WELLNESS EXAM: Primary | ICD-10-CM

## 2024-11-05 DIAGNOSIS — I10 PRIMARY HYPERTENSION: ICD-10-CM

## 2024-11-05 LAB
ANION GAP SERPL CALCULATED.3IONS-SCNC: 10 MMOL/L (ref 7–15)
BUN SERPL-MCNC: 14.7 MG/DL (ref 8–23)
CALCIUM SERPL-MCNC: 9.5 MG/DL (ref 8.8–10.4)
CHLORIDE SERPL-SCNC: 104 MMOL/L (ref 98–107)
CHOLESTEROL: 159 MG/DL (ref 100–199)
CREAT SERPL-MCNC: 0.9 MG/DL (ref 0.67–1.17)
EGFRCR SERPLBLD CKD-EPI 2021: 88 ML/MIN/1.73M2
EST. AVERAGE GLUCOSE BLD GHB EST-MCNC: 128 MG/DL
FASTING STATUS PATIENT QL REPORTED: YES
FASTING?: YES
GLUCOSE SERPL-MCNC: 117 MG/DL (ref 70–99)
HBA1C MFR BLD: 6.1 %
HCO3 SERPL-SCNC: 28 MMOL/L (ref 22–29)
HDL (RMG): 53 MG/DL (ref 40–?)
LDL CALCULATED (RMG): 91 MG/DL (ref 0–130)
POTASSIUM SERPL-SCNC: 4.5 MMOL/L (ref 3.4–5.3)
PSA SERPL DL<=0.01 NG/ML-MCNC: <0.01 NG/ML (ref 0–6.5)
SODIUM SERPL-SCNC: 142 MMOL/L (ref 135–145)
TRIGLYCERIDES (RMG): 75 MG/DL (ref 0–149)

## 2024-11-05 PROCEDURE — 83036 HEMOGLOBIN GLYCOSYLATED A1C: CPT | Performed by: FAMILY MEDICINE

## 2024-11-05 PROCEDURE — 80048 BASIC METABOLIC PNL TOTAL CA: CPT | Mod: 90 | Performed by: FAMILY MEDICINE

## 2024-11-05 PROCEDURE — 84153 ASSAY OF PSA TOTAL: CPT | Performed by: FAMILY MEDICINE

## 2024-11-05 PROCEDURE — G0103 PSA SCREENING: HCPCS | Mod: 90 | Performed by: FAMILY MEDICINE

## 2024-11-05 PROCEDURE — 80048 BASIC METABOLIC PNL TOTAL CA: CPT | Performed by: FAMILY MEDICINE

## 2024-11-05 PROCEDURE — G0439 PPPS, SUBSEQ VISIT: HCPCS | Performed by: FAMILY MEDICINE

## 2024-11-05 PROCEDURE — 20610 DRAIN/INJ JOINT/BURSA W/O US: CPT | Mod: LT | Performed by: FAMILY MEDICINE

## 2024-11-05 PROCEDURE — 36415 COLL VENOUS BLD VENIPUNCTURE: CPT | Performed by: FAMILY MEDICINE

## 2024-11-05 PROCEDURE — 99214 OFFICE O/P EST MOD 30 MIN: CPT | Mod: 25 | Performed by: FAMILY MEDICINE

## 2024-11-05 PROCEDURE — 80061 LIPID PANEL: CPT | Mod: QW | Performed by: FAMILY MEDICINE

## 2024-11-05 RX ORDER — ROSUVASTATIN CALCIUM 5 MG/1
TABLET, COATED ORAL
Qty: 90 TABLET | Refills: 3 | Status: SHIPPED | OUTPATIENT
Start: 2024-11-05

## 2024-11-05 RX ORDER — TRIAMCINOLONE ACETONIDE 40 MG/ML
40 INJECTION, SUSPENSION INTRA-ARTICULAR; INTRAMUSCULAR ONCE
Status: COMPLETED | OUTPATIENT
Start: 2024-11-05 | End: 2024-11-05

## 2024-11-05 RX ORDER — FLUOCINOLONE ACETONIDE 0.11 MG/ML
5 OIL AURICULAR (OTIC) 2 TIMES DAILY
Qty: 20 ML | Refills: 0 | Status: SHIPPED | OUTPATIENT
Start: 2024-11-05 | End: 2024-11-15

## 2024-11-05 RX ORDER — IRBESARTAN 75 MG/1
75 TABLET ORAL AT BEDTIME
Qty: 90 TABLET | Refills: 0 | Status: SHIPPED | OUTPATIENT
Start: 2024-11-05

## 2024-11-05 RX ADMIN — TRIAMCINOLONE ACETONIDE 40 MG: 40 INJECTION, SUSPENSION INTRA-ARTICULAR; INTRAMUSCULAR at 10:31

## 2024-12-03 ENCOUNTER — OFFICE VISIT (OUTPATIENT)
Dept: FAMILY MEDICINE | Facility: CLINIC | Age: 79
End: 2024-12-03

## 2024-12-03 VITALS
OXYGEN SATURATION: 98 % | SYSTOLIC BLOOD PRESSURE: 139 MMHG | BODY MASS INDEX: 29.35 KG/M2 | DIASTOLIC BLOOD PRESSURE: 89 MMHG | HEIGHT: 70 IN | HEART RATE: 55 BPM | WEIGHT: 205 LBS

## 2024-12-03 DIAGNOSIS — I10 PRIMARY HYPERTENSION: ICD-10-CM

## 2024-12-03 LAB
ANION GAP SERPL CALCULATED.3IONS-SCNC: 10 MMOL/L (ref 7–15)
BUN SERPL-MCNC: 22.4 MG/DL (ref 8–23)
CALCIUM SERPL-MCNC: 9.3 MG/DL (ref 8.8–10.4)
CHLORIDE SERPL-SCNC: 106 MMOL/L (ref 98–107)
CREAT SERPL-MCNC: 0.93 MG/DL (ref 0.67–1.17)
EGFRCR SERPLBLD CKD-EPI 2021: 85 ML/MIN/1.73M2
FASTING STATUS PATIENT QL REPORTED: NO
GLUCOSE SERPL-MCNC: 112 MG/DL (ref 70–99)
HCO3 SERPL-SCNC: 27 MMOL/L (ref 22–29)
POTASSIUM SERPL-SCNC: 3.9 MMOL/L (ref 3.4–5.3)
SODIUM SERPL-SCNC: 143 MMOL/L (ref 135–145)

## 2024-12-03 PROCEDURE — G2211 COMPLEX E/M VISIT ADD ON: HCPCS | Performed by: FAMILY MEDICINE

## 2024-12-03 PROCEDURE — 80048 BASIC METABOLIC PNL TOTAL CA: CPT | Performed by: FAMILY MEDICINE

## 2024-12-03 PROCEDURE — 82565 ASSAY OF CREATININE: CPT | Performed by: FAMILY MEDICINE

## 2024-12-03 PROCEDURE — 36415 COLL VENOUS BLD VENIPUNCTURE: CPT | Performed by: FAMILY MEDICINE

## 2024-12-03 PROCEDURE — 80048 BASIC METABOLIC PNL TOTAL CA: CPT | Mod: 90 | Performed by: FAMILY MEDICINE

## 2024-12-03 PROCEDURE — 99214 OFFICE O/P EST MOD 30 MIN: CPT | Performed by: FAMILY MEDICINE

## 2024-12-03 RX ORDER — IRBESARTAN 150 MG/1
150 TABLET ORAL AT BEDTIME
COMMUNITY
Start: 2024-12-03

## 2024-12-03 NOTE — PROGRESS NOTES
"  Jon Sanchez is a 79 year old patient who presents to clinic for follow up of HTN.  Started on Irbesartan 75 mg last visit.  Tolerating well.  No side effects.  Home readings high 130s over high 80s.        Review of Systems   Constitutional, HEENT, cardiovascular, pulmonary, GI, , musculoskeletal, neuro, skin, endocrine and psych systems are negative, except as otherwise noted.      Objective    /89   Pulse 55   Ht 1.778 m (5' 10\")   Wt 93 kg (205 lb)   SpO2 98%   BMI 29.41 kg/m      General: Well appearing, NAD  Psych: normal mood and affect        No results found for this or any previous visit (from the past 24 hours).    Primary hypertension  Nearly at goal  Increase to 150 mg and recheck in one month  - irbesartan (AVAPRO) 150 MG tablet; Take 1 tablet (150 mg) by mouth at bedtime.  - Basic metabolic panel; Future    Follow up in 4 weeks          Answers submitted by the patient for this visit:  General Questionnaire (Submitted on 11/26/2024)  Chief Complaint: Chronic problems general questions HPI Form  How many days per week do you miss taking your medication?: 0  General Concern (Submitted on 11/26/2024)  Chief Complaint: Chronic problems general questions HPI Form  What is the reason for your visit today?: Blood pressure issue  When did your symptoms begin?: More than a month  What are your symptoms?: Hi blood pressure  How would you describe these symptoms?: Moderate  Are your symptoms:: Improving  Have you had these symptoms before?: No  Is there anything that makes you feel worse?: No  Questionnaire about: Chronic problems general questions HPI Form (Submitted on 11/26/2024)  Chief Complaint: Chronic problems general questions HPI Form    "

## 2025-01-07 ENCOUNTER — OFFICE VISIT (OUTPATIENT)
Dept: FAMILY MEDICINE | Facility: CLINIC | Age: 80
End: 2025-01-07

## 2025-01-07 VITALS
HEART RATE: 56 BPM | SYSTOLIC BLOOD PRESSURE: 132 MMHG | DIASTOLIC BLOOD PRESSURE: 82 MMHG | WEIGHT: 202 LBS | HEIGHT: 70 IN | BODY MASS INDEX: 28.92 KG/M2 | OXYGEN SATURATION: 99 %

## 2025-01-07 DIAGNOSIS — I10 PRIMARY HYPERTENSION: ICD-10-CM

## 2025-01-07 LAB
ANION GAP SERPL CALCULATED.3IONS-SCNC: 8 MMOL/L (ref 7–15)
BUN SERPL-MCNC: 23 MG/DL (ref 8–23)
CALCIUM SERPL-MCNC: 9.4 MG/DL (ref 8.8–10.4)
CHLORIDE SERPL-SCNC: 105 MMOL/L (ref 98–107)
CREAT SERPL-MCNC: 0.82 MG/DL (ref 0.67–1.17)
EGFRCR SERPLBLD CKD-EPI 2021: 90 ML/MIN/1.73M2
FASTING STATUS PATIENT QL REPORTED: NO
GLUCOSE SERPL-MCNC: 87 MG/DL (ref 70–99)
HCO3 SERPL-SCNC: 28 MMOL/L (ref 22–29)
POTASSIUM SERPL-SCNC: 4.1 MMOL/L (ref 3.4–5.3)
SODIUM SERPL-SCNC: 141 MMOL/L (ref 135–145)

## 2025-01-07 PROCEDURE — G2211 COMPLEX E/M VISIT ADD ON: HCPCS | Performed by: FAMILY MEDICINE

## 2025-01-07 PROCEDURE — 80048 BASIC METABOLIC PNL TOTAL CA: CPT | Performed by: FAMILY MEDICINE

## 2025-01-07 PROCEDURE — 36415 COLL VENOUS BLD VENIPUNCTURE: CPT | Performed by: FAMILY MEDICINE

## 2025-01-07 PROCEDURE — 82310 ASSAY OF CALCIUM: CPT | Performed by: FAMILY MEDICINE

## 2025-01-07 PROCEDURE — 99213 OFFICE O/P EST LOW 20 MIN: CPT | Performed by: FAMILY MEDICINE

## 2025-01-07 PROCEDURE — 80048 BASIC METABOLIC PNL TOTAL CA: CPT | Mod: 90 | Performed by: FAMILY MEDICINE

## 2025-01-07 RX ORDER — IRBESARTAN 150 MG/1
150 TABLET ORAL AT BEDTIME
Qty: 90 TABLET | Refills: 3 | Status: SHIPPED | OUTPATIENT
Start: 2025-01-07

## 2025-01-07 NOTE — PROGRESS NOTES
"Answers submitted by the patient for this visit:  Hypertension Visit (Submitted on 12/31/2024)  Chief Complaint: Chronic problems general questions HPI Form  Do you check your blood pressure regularly outside of the clinic?: Yes  Are your blood pressures ever more than 140 on the top number (systolic) OR more than 90 on the bottom number (diastolic)? (For example, greater than 140/90): No  Are you following a low salt diet?: Yes  General Questionnaire (Submitted on 12/31/2024)  Chief Complaint: Chronic problems general questions HPI Form  How many servings of fruits and vegetables do you eat daily?: 2-3  On average, how many sweetened beverages do you drink each day (Examples: soda, juice, sweet tea, etc.  Do NOT count diet or artificially sweetened beverages)?: 1  How many minutes a day do you exercise enough to make your heart beat faster?: 60 or more  How many days a week do you exercise enough to make your heart beat faster?: 6  How many days per week do you miss taking your medication?: 0  Questionnaire about: Chronic problems general questions HPI Form (Submitted on 12/31/2024)  Chief Complaint: Chronic problems general questions HPI Form        Subjective     Daniel is a 79 year old patient who presents to clinic for HTN follow up.  Last visit his Irbesartan was increased to 150 mg.  He has tolerated this well.  He brought his home readings and all of them are at goal now.  No other concerns.        Review of Systems   Constitutional, HEENT, cardiovascular, pulmonary, GI, , musculoskeletal, neuro, skin, endocrine and psych systems are negative, except as otherwise noted.      Objective    /82   Pulse 56   Ht 1.778 m (5' 10\")   Wt 91.6 kg (202 lb)   SpO2 99%   BMI 28.98 kg/m      General: Well appearing, NAD  Psych: normal mood and affect        No results found for this or any previous visit (from the past 24 hours).    Primary hypertension  At goal  Cont current dose  Recheck BMP  - irbesartan " (AVAPRO) 150 MG tablet; Take 1 tablet (150 mg) by mouth at bedtime.  - Basic metabolic panel; Future    Follow up for AWV, sooner if needed

## 2025-01-24 NOTE — PROGRESS NOTES
SUBJECTIVE:  Daniel Samayoa is a 74 year old male who presents with left ear itching for 1 month(s).  Severity: moderate   Timing:gradual onset, still present and worsening  Additional symptoms include cough and mild allergy sx    History of recurrent otitis: no    Past Medical History:   Diagnosis Date     Cataract 5/10/2012     Dyslipidemia      Hyperlipidaemia LDL goal < 100      Prostate cancer (H)      Current Outpatient Medications   Medication Sig Dispense Refill     Ascorbic Acid (C 1000 PO) Take by mouth every other day        aspirin 81 MG EC tablet Take 81 mg by mouth daily.       B Complex Vitamins (B COMPLEX 50 PO) Take  by mouth.       calcium carbonate (TUMS) 500 MG chewable tablet Take 2 chew tab by mouth daily       Calcium-Vitamin D (CALCIUM 500 +D) 500-125 MG-UNIT TABS Take  by mouth daily.       fish oil-omega-3 fatty acids (FISH OIL) 1000 MG capsule Take 2 g by mouth daily.       Ibuprofen (ADVIL) 200 MG capsule Take 200 mg by mouth every 4 hours as needed.       omeprazole (PRILOSEC) 20 MG DR capsule Take 1 capsule (20 mg) by mouth daily as needed 90 capsule 11     rosuvastatin (CRESTOR) 5 MG tablet TAKE 1 TABLET (5 MG) BY MOUTH AT BEDTIME 90 tablet 3     sildenafil (REVATIO) 20 MG tablet TAKE UP TO THREE TABLETS AS NEEDED FOR SEXUAL ACTIVITY.  Never use with nitroglycerin, terazosin or doxazosin. 90 tablet 3     Social History     Tobacco Use     Smoking status: Never Smoker     Smokeless tobacco: Never Used   Substance Use Topics     Alcohol use: Yes     Alcohol/week: 1.7 standard drinks     Types: 2 Cans of beer per week     Comment: little     ROS:   CONSTITUTIONAL:NEGATIVE for fever, chills, change in weight  ENT/MOUTH: NEGATIVE for mouth and throat problems    OBJECTIVE:  /82   Pulse (!) 43   Resp 16   Wt 97.4 kg (214 lb 12.8 oz)   SpO2 97%   BMI 31.72 kg/m     EXAM:  The right TM is normal: no effusions, no erythema, and normal landmarks     The right auditory canal is  normal and without drainage, edema or erythema  The left TM is normal: no effusions, no erythema, and normal landmarks  The left auditory canal is erythematous, swollen and crusty/red  Oropharynx exam is normal: no lesions, erythema, adenopathy or exudate.  GENERAL: no acute distress  EYES: EOMI,  PERRL, conjunctiva clear  NECK: supple, non-tender to palpation, no adenopathy noted  RESP: lungs clear to auscultation - no rales, rhonchi or wheezes  CV: regular rates and rhythm, normal S1 S2, no murmur noted  SKIN: no suspicious lesions or rashes     ASSESSMENT:  1. Rash  Steroid cream   abx   See back in 1 week  - amoxicillin (AMOXIL) 500 MG capsule; Take 1 capsule (500 mg) by mouth 3 times daily  Dispense: 21 capsule; Refill: 1  - triamcinolone (KENALOG) 0.1 % external cream; Apply topically 2 times daily  Dispense: 80 g; Refill: 1      Detail Level: Detailed

## 2025-03-02 ENCOUNTER — HEALTH MAINTENANCE LETTER (OUTPATIENT)
Age: 80
End: 2025-03-02

## 2025-03-05 NOTE — MR AVS SNAPSHOT
After Visit Summary   8/25/2017    Daniel Samayoa    MRN: [id number]           Patient Information     Date Of Birth          1945        Visit Information        Provider Department      8/25/2017 8:30 AM Henry Cornelius MD [facility]        Today's Diagnoses     Need for tetanus booster    -  1    Medicare annual wellness visit, subsequent        Encounter for medication refill        Need for hepatitis C screening test        Dyslipidemia        Prostate cancer (H)          Care Instructions      Preventive Health Recommendations:       Male Ages 65 and over    Yearly exam:             See your health care provider every year in order to  o   Review health changes.   o   Discuss preventive care.    o   Review your medicines if your doctor has prescribed any.    Talk with your health care provider about whether you should have a test to screen for prostate cancer (PSA).    Every 3 years, have a diabetes test (fasting glucose). If you are at risk for diabetes, you should have this test more often.    Every 5 years, have a cholesterol test. Have this test more often if you are at risk for high cholesterol or heart disease.     Every 10 years, have a colonoscopy. Or, have a yearly FIT test (stool test). These exams will check for colon cancer.    Talk to with your health care provider about screening for Abdominal Aortic Aneurysm if you have a family history of AAA or have a history of smoking.  Shots:     Get a flu shot each year.     Get a tetanus shot every 10 years.     Talk to your doctor about your pneumonia vaccines. There are now two you should receive - Pneumovax (PPSV 23) and Prevnar (PCV 13).    Talk to your doctor about a shingles vaccine.     Talk to your doctor about the hepatitis B vaccine.  Nutrition:     Eat at least 5 servings of fruits and vegetables each day.     Eat whole-grain bread, whole-wheat pasta and brown rice instead of white grains and rice.     Talk  Support - [employer]    Let's Chat - Virtual Support Groups - [employer]    "to your doctor about Calcium and Vitamin D.   Lifestyle    Exercise for at least 150 minutes a week (30 minutes a day, 5 days a week). This will help you control your weight and prevent disease.     Limit alcohol to one drink per day.     No smoking.     Wear sunscreen to prevent skin cancer.     See your dentist every six months for an exam and cleaning.     See your eye doctor every 1 to 2 years to screen for conditions such as glaucoma, macular degeneration and cataracts.          Follow-ups after your visit        Who to contact     If you have questions or need follow up information about today's clinic visit or your schedule please contact [facility] directly at [phone].  Normal or non-critical lab and imaging results will be communicated to you by [employer], letter or phone within 4 business days after the clinic has received the results. If you do not hear from us within 7 days, please contact the clinic through [employer] or phone. If you have a critical or abnormal lab result, we will notify you by phone as soon as possible.  Submit refill requests through [facility] [employer] or call your pharmacy and they will forward the refill request to us. Please allow 3 business days for your refill to be completed.          Additional Information About Your Visit        [facility] Information     [employer] gives you secure access to your electronic health record. If you see a primary care provider, you can also send messages to your care team and make appointments. If you have questions, please call your primary care clinic.  If you do not have a primary care provider, please call [phone] and they will assist you.        Care EveryWhere ID     This is your Care EveryWhere ID. This could be used by other organizations to access your [place] medical records  [id number]        Your Vitals Were     Pulse Temperature Height Pulse Oximetry BMI (Body Mass Index)       65 98  F (36.7  C) 1.74 m (5' 8.5\") 98% 30.57 kg/m2 "        Blood Pressure from Last 3 Encounters:   08/25/17 118/70   01/10/17 135/78   12/20/16 147/80    Weight from Last 3 Encounters:   08/25/17 92.5 kg (204 lb)   12/20/16 92.5 kg (203 lb 14.4 oz)   10/18/16 90.3 kg (199 lb)              We Performed the Following     HCV Antibody ([facility])     OFFICE/OUTPT VISIT,EST,LEVL III     TDAP VACCINE ([facility])     VENOUS COLLECTION          Today's Medication Changes          These changes are accurate as of: 8/25/17  2:38 PM.  If you have any questions, ask your nurse or doctor.               These medicines have changed or have updated prescriptions.        Dose/Directions    omeprazole 20 MG CR capsule   Commonly known as:  priLOSEC   This may have changed:  See the new instructions.   Used for:  Encounter for medication refill   Changed by:  Henry Cornelius MD        Dose:  20 mg   Take 1 capsule (20 mg) by mouth daily as needed   Quantity:  30 capsule   Refills:  11                Primary Care Provider Office Phone # Fax #    Henry Cornelius [phone] [id number] [facility] [id number]        Equal Access to Services     [facility]: Hadii aad ku hadasho Soomaali, waaxda luqadaha, qaybta kaalmada adeegyada, waxay anne schafer. So [facility] [phone].    ATENCIÓN: Si habla español, tiene a olson disposición servicios gratuitos de asistencia lingüística. [facility] [phone].    We comply with applicable federal civil rights laws and [place] laws. We do not discriminate on the basis of race, color, national origin, age, disability sex, sexual orientation or gender identity.            Thank you!     Thank you for choosing [facility]  for your care. Our goal is always to provide you with excellent care. Hearing back from our patients is one way we can continue to improve our services. Please take a few minutes to complete the written survey that you may receive in the mail after your visit with us.  Thank you!             Your Updated Medication List - Protect others around you: Learn how to safely use, store and throw away your medicines at www.disposemymeds.org.          This list is accurate as of: 8/25/17  2:38 PM.  Always use your most recent med list.                   Brand Name Dispense Instructions for use Diagnosis    ADVIL 200 MG capsule   Generic drug:  ibuprofen      Take 200 mg by mouth every 4 hours as needed.        aspirin 81 MG EC tablet      Take 81 mg by mouth daily.        B COMPLEX 50 PO      Take  by mouth.        C 1000 PO      Take  by mouth.        calcium-vitamin D 500-125 MG-UNIT Tabs      Take  by mouth daily.        D 2000 2000 UNITS tablet   Generic drug:  cholecalciferol      Take 1 tablet by mouth daily.        fish oil-omega-3 fatty acids 1000 MG capsule      Take 2 g by mouth daily.        omeprazole 20 MG CR capsule    priLOSEC    30 capsule    Take 1 capsule (20 mg) by mouth daily as needed    Encounter for medication refill       rosuvastatin 5 MG tablet    CRESTOR    90 tablet    Take 1 tablet (5 mg) by mouth At Bedtime    Dyslipidemia       sildenafil 100 MG cap/tab    VIAGRA    6 tablet    Take 0.5-1 tablets ( mg) by mouth daily as needed for erectile dysfunction Take 30 min to 4 hours before intercourse.  Never use with nitroglycerin, terazosin or doxazosin.    Erectile dysfunction following radical prostatectomy       TUMS 500 MG chewable tablet   Generic drug:  calcium carbonate      Take 2 chew tab by mouth daily

## 2025-08-27 ENCOUNTER — OFFICE VISIT (OUTPATIENT)
Dept: OPTOMETRY | Facility: CLINIC | Age: 80
End: 2025-08-27
Payer: COMMERCIAL

## 2025-08-27 DIAGNOSIS — H43.813 PVD (POSTERIOR VITREOUS DETACHMENT), BILATERAL: ICD-10-CM

## 2025-08-27 DIAGNOSIS — Z98.890 S/P LASIK SURGERY: ICD-10-CM

## 2025-08-27 DIAGNOSIS — H52.4 PRESBYOPIA: ICD-10-CM

## 2025-08-27 DIAGNOSIS — H52.223 REGULAR ASTIGMATISM OF BOTH EYES: ICD-10-CM

## 2025-08-27 DIAGNOSIS — H25.13 NUCLEAR AGE-RELATED CATARACT, BOTH EYES: ICD-10-CM

## 2025-08-27 DIAGNOSIS — Z01.01 ENCOUNTER FOR EXAMINATION OF EYES AND VISION WITH ABNORMAL FINDINGS: Primary | ICD-10-CM

## 2025-08-27 DIAGNOSIS — H52.13 MYOPIA OF BOTH EYES: ICD-10-CM

## 2025-08-27 PROCEDURE — 92015 DETERMINE REFRACTIVE STATE: CPT | Performed by: OPTOMETRIST

## 2025-08-27 PROCEDURE — 92014 COMPRE OPH EXAM EST PT 1/>: CPT | Performed by: OPTOMETRIST

## 2025-08-27 ASSESSMENT — REFRACTION_WEARINGRX
OD_AXIS: 017
OS_AXIS: 119
OS_SPHERE: -2.00
OS_CYLINDER: +0.25
OS_CYLINDER: +1.00
OS_SPHERE: +2.00
OS_ADD: +2.50
OD_SPHERE: -4.00
OD_ADD: +2.50
OD_CYLINDER: +2.00
OS_AXIS: 111
SPECS_TYPE: SVL READING
OD_SPHERE: PLANO
OD_AXIS: 009
OD_CYLINDER: +1.25

## 2025-08-27 ASSESSMENT — REFRACTION_MANIFEST
OS_ADD: +2.50
OD_CYLINDER: +0.75
OD_SPHERE: -4.50
OS_SPHERE: -2.25
OD_AXIS: 006
OS_AXIS: 106
OD_ADD: +2.50
OS_CYLINDER: +0.75

## 2025-08-27 ASSESSMENT — TONOMETRY
OD_IOP_MMHG: 19
IOP_METHOD: APPLANATION
OS_IOP_MMHG: 16

## 2025-08-27 ASSESSMENT — CUP TO DISC RATIO
OS_RATIO: 0.25
OD_RATIO: 0.25

## 2025-08-27 ASSESSMENT — VISUAL ACUITY
OS_CC: 20/25
METHOD: SNELLEN - LINEAR
OD_CC: 20/40
CORRECTION_TYPE: GLASSES
OD_CC: J3
OS_CC: J7
OS_CC+: -2

## 2025-08-27 ASSESSMENT — CONF VISUAL FIELD
OS_SUPERIOR_TEMPORAL_RESTRICTION: 0
OS_INFERIOR_TEMPORAL_RESTRICTION: 0
OD_INFERIOR_TEMPORAL_RESTRICTION: 0
OD_SUPERIOR_NASAL_RESTRICTION: 0
OD_NORMAL: 1
OS_INFERIOR_NASAL_RESTRICTION: 0
OD_INFERIOR_NASAL_RESTRICTION: 0
OD_SUPERIOR_TEMPORAL_RESTRICTION: 0
OS_NORMAL: 1
OS_SUPERIOR_NASAL_RESTRICTION: 0

## 2025-08-27 ASSESSMENT — EXTERNAL EXAM - LEFT EYE: OS_EXAM: NORMAL

## 2025-08-27 ASSESSMENT — EXTERNAL EXAM - RIGHT EYE: OD_EXAM: NORMAL

## 2025-08-27 ASSESSMENT — SLIT LAMP EXAM - LIDS
COMMENTS: NORMAL
COMMENTS: NORMAL